# Patient Record
Sex: FEMALE | Race: BLACK OR AFRICAN AMERICAN | NOT HISPANIC OR LATINO | Employment: FULL TIME | URBAN - METROPOLITAN AREA
[De-identification: names, ages, dates, MRNs, and addresses within clinical notes are randomized per-mention and may not be internally consistent; named-entity substitution may affect disease eponyms.]

---

## 2023-10-16 ENCOUNTER — APPOINTMENT (OUTPATIENT)
Dept: RADIOLOGY | Facility: HOSPITAL | Age: 43
End: 2023-10-16
Payer: COMMERCIAL

## 2023-10-16 ENCOUNTER — HOSPITAL ENCOUNTER (OUTPATIENT)
Facility: HOSPITAL | Age: 43
Setting detail: OBSERVATION
Discharge: HOME | End: 2023-10-17
Attending: STUDENT IN AN ORGANIZED HEALTH CARE EDUCATION/TRAINING PROGRAM | Admitting: INTERNAL MEDICINE
Payer: COMMERCIAL

## 2023-10-16 DIAGNOSIS — F41.9 ANXIETY DISORDER, UNSPECIFIED TYPE: ICD-10-CM

## 2023-10-16 DIAGNOSIS — M79.605 PAIN IN LEFT LEG: ICD-10-CM

## 2023-10-16 DIAGNOSIS — E87.6 HYPOKALEMIA: Primary | ICD-10-CM

## 2023-10-16 DIAGNOSIS — I10 HYPERTENSION, UNSPECIFIED TYPE: ICD-10-CM

## 2023-10-16 DIAGNOSIS — R06.02 SHORTNESS OF BREATH: ICD-10-CM

## 2023-10-16 LAB
ALBUMIN SERPL BCP-MCNC: 4.4 G/DL (ref 3.4–5)
ALP SERPL-CCNC: 115 U/L (ref 33–110)
ALT SERPL W P-5'-P-CCNC: 61 U/L (ref 7–45)
ANION GAP SERPL CALC-SCNC: 26 MMOL/L (ref 10–20)
AST SERPL W P-5'-P-CCNC: 128 U/L (ref 9–39)
BASE EXCESS BLDV CALC-SCNC: -5.5 MMOL/L (ref -2–3)
BASOPHILS # BLD AUTO: 0.01 X10*3/UL (ref 0–0.1)
BASOPHILS NFR BLD AUTO: 0.2 %
BILIRUB SERPL-MCNC: 0.9 MG/DL (ref 0–1.2)
BODY TEMPERATURE: 37 DEGREES CELSIUS
BUN SERPL-MCNC: 4 MG/DL (ref 6–23)
CALCIUM SERPL-MCNC: 8.7 MG/DL (ref 8.6–10.3)
CARDIAC TROPONIN I PNL SERPL HS: 10 NG/L (ref 0–13)
CHLORIDE SERPL-SCNC: 98 MMOL/L (ref 98–107)
CO2 SERPL-SCNC: 17 MMOL/L (ref 21–32)
CREAT SERPL-MCNC: 0.59 MG/DL (ref 0.5–1.05)
EOSINOPHIL # BLD AUTO: 0 X10*3/UL (ref 0–0.7)
EOSINOPHIL NFR BLD AUTO: 0 %
ERYTHROCYTE [DISTWIDTH] IN BLOOD BY AUTOMATED COUNT: 13 % (ref 11.5–14.5)
ETHANOL SERPL-MCNC: 41 MG/DL
GFR SERPL CREATININE-BSD FRML MDRD: >90 ML/MIN/1.73M*2
GLUCOSE SERPL-MCNC: 101 MG/DL (ref 74–99)
HCO3 BLDV-SCNC: 17.5 MMOL/L (ref 22–26)
HCT VFR BLD AUTO: 36.7 % (ref 36–46)
HGB BLD-MCNC: 12.7 G/DL (ref 12–16)
IMM GRANULOCYTES # BLD AUTO: 0.01 X10*3/UL (ref 0–0.7)
IMM GRANULOCYTES NFR BLD AUTO: 0.2 % (ref 0–0.9)
INHALED O2 CONCENTRATION: 21 %
LYMPHOCYTES # BLD AUTO: 1.79 X10*3/UL (ref 1.2–4.8)
LYMPHOCYTES NFR BLD AUTO: 35.6 %
MAGNESIUM SERPL-MCNC: 0.8 MG/DL (ref 1.6–2.4)
MCH RBC QN AUTO: 33.5 PG (ref 26–34)
MCHC RBC AUTO-ENTMCNC: 34.6 G/DL (ref 32–36)
MCV RBC AUTO: 97 FL (ref 80–100)
MONOCYTES # BLD AUTO: 0.51 X10*3/UL (ref 0.1–1)
MONOCYTES NFR BLD AUTO: 10.1 %
NEUTROPHILS # BLD AUTO: 2.71 X10*3/UL (ref 1.2–7.7)
NEUTROPHILS NFR BLD AUTO: 53.9 %
NRBC BLD-RTO: 0 /100 WBCS (ref 0–0)
OXYHGB MFR BLDV: 80.8 % (ref 45–75)
PCO2 BLDV: 27 MM HG (ref 41–51)
PH BLDV: 7.42 PH (ref 7.33–7.43)
PLATELET # BLD AUTO: 227 X10*3/UL (ref 150–450)
PMV BLD AUTO: 10.4 FL (ref 7.5–11.5)
PO2 BLDV: 53 MM HG (ref 35–45)
POTASSIUM SERPL-SCNC: 2.7 MMOL/L (ref 3.5–5.3)
PROT SERPL-MCNC: 7.8 G/DL (ref 6.4–8.2)
RBC # BLD AUTO: 3.79 X10*6/UL (ref 4–5.2)
SAO2 % BLDV: 84 % (ref 45–75)
SODIUM SERPL-SCNC: 138 MMOL/L (ref 136–145)
TEST COMMENT: ABNORMAL
WBC # BLD AUTO: 5 X10*3/UL (ref 4.4–11.3)

## 2023-10-16 PROCEDURE — 71045 X-RAY EXAM CHEST 1 VIEW: CPT | Mod: FY

## 2023-10-16 PROCEDURE — 85025 COMPLETE CBC W/AUTO DIFF WBC: CPT | Performed by: STUDENT IN AN ORGANIZED HEALTH CARE EDUCATION/TRAINING PROGRAM

## 2023-10-16 PROCEDURE — 99285 EMERGENCY DEPT VISIT HI MDM: CPT | Performed by: STUDENT IN AN ORGANIZED HEALTH CARE EDUCATION/TRAINING PROGRAM

## 2023-10-16 PROCEDURE — 96366 THER/PROPH/DIAG IV INF ADDON: CPT

## 2023-10-16 PROCEDURE — 94640 AIRWAY INHALATION TREATMENT: CPT

## 2023-10-16 PROCEDURE — 80307 DRUG TEST PRSMV CHEM ANLYZR: CPT | Performed by: STUDENT IN AN ORGANIZED HEALTH CARE EDUCATION/TRAINING PROGRAM

## 2023-10-16 PROCEDURE — 36415 COLL VENOUS BLD VENIPUNCTURE: CPT | Performed by: STUDENT IN AN ORGANIZED HEALTH CARE EDUCATION/TRAINING PROGRAM

## 2023-10-16 PROCEDURE — 84484 ASSAY OF TROPONIN QUANT: CPT | Performed by: STUDENT IN AN ORGANIZED HEALTH CARE EDUCATION/TRAINING PROGRAM

## 2023-10-16 PROCEDURE — 82805 BLOOD GASES W/O2 SATURATION: CPT | Performed by: STUDENT IN AN ORGANIZED HEALTH CARE EDUCATION/TRAINING PROGRAM

## 2023-10-16 PROCEDURE — 2500000001 HC RX 250 WO HCPCS SELF ADMINISTERED DRUGS (ALT 637 FOR MEDICARE OP): Performed by: STUDENT IN AN ORGANIZED HEALTH CARE EDUCATION/TRAINING PROGRAM

## 2023-10-16 PROCEDURE — 83735 ASSAY OF MAGNESIUM: CPT | Performed by: STUDENT IN AN ORGANIZED HEALTH CARE EDUCATION/TRAINING PROGRAM

## 2023-10-16 PROCEDURE — 2500000002 HC RX 250 W HCPCS SELF ADMINISTERED DRUGS (ALT 637 FOR MEDICARE OP, ALT 636 FOR OP/ED): Performed by: STUDENT IN AN ORGANIZED HEALTH CARE EDUCATION/TRAINING PROGRAM

## 2023-10-16 PROCEDURE — 96375 TX/PRO/DX INJ NEW DRUG ADDON: CPT

## 2023-10-16 PROCEDURE — 80053 COMPREHEN METABOLIC PANEL: CPT | Performed by: STUDENT IN AN ORGANIZED HEALTH CARE EDUCATION/TRAINING PROGRAM

## 2023-10-16 PROCEDURE — 71045 X-RAY EXAM CHEST 1 VIEW: CPT | Performed by: RADIOLOGY

## 2023-10-16 PROCEDURE — 2500000004 HC RX 250 GENERAL PHARMACY W/ HCPCS (ALT 636 FOR OP/ED): Performed by: STUDENT IN AN ORGANIZED HEALTH CARE EDUCATION/TRAINING PROGRAM

## 2023-10-16 PROCEDURE — G0378 HOSPITAL OBSERVATION PER HR: HCPCS

## 2023-10-16 PROCEDURE — 96365 THER/PROPH/DIAG IV INF INIT: CPT

## 2023-10-16 PROCEDURE — 82077 ASSAY SPEC XCP UR&BREATH IA: CPT | Performed by: STUDENT IN AN ORGANIZED HEALTH CARE EDUCATION/TRAINING PROGRAM

## 2023-10-16 RX ORDER — ACETAMINOPHEN 325 MG/1
975 TABLET ORAL EVERY 6 HOURS PRN
Status: DISCONTINUED | OUTPATIENT
Start: 2023-10-16 | End: 2023-10-17 | Stop reason: HOSPADM

## 2023-10-16 RX ORDER — POTASSIUM CHLORIDE 1.5 G/1.58G
40 POWDER, FOR SOLUTION ORAL ONCE
Status: COMPLETED | OUTPATIENT
Start: 2023-10-16 | End: 2023-10-16

## 2023-10-16 RX ORDER — POTASSIUM CHLORIDE 14.9 MG/ML
20 INJECTION INTRAVENOUS ONCE
Status: COMPLETED | OUTPATIENT
Start: 2023-10-16 | End: 2023-10-17

## 2023-10-16 RX ORDER — PANTOPRAZOLE SODIUM 40 MG/10ML
40 INJECTION, POWDER, LYOPHILIZED, FOR SOLUTION INTRAVENOUS
Status: DISCONTINUED | OUTPATIENT
Start: 2023-10-17 | End: 2023-10-17 | Stop reason: HOSPADM

## 2023-10-16 RX ORDER — ALBUTEROL SULFATE 0.83 MG/ML
2.5 SOLUTION RESPIRATORY (INHALATION) ONCE
Status: COMPLETED | OUTPATIENT
Start: 2023-10-16 | End: 2023-10-16

## 2023-10-16 RX ORDER — DOCUSATE SODIUM 100 MG/1
100 CAPSULE, LIQUID FILLED ORAL 2 TIMES DAILY
Status: DISCONTINUED | OUTPATIENT
Start: 2023-10-17 | End: 2023-10-17 | Stop reason: HOSPADM

## 2023-10-16 RX ORDER — TALC
3 POWDER (GRAM) TOPICAL
Status: DISCONTINUED | OUTPATIENT
Start: 2023-10-16 | End: 2023-10-17 | Stop reason: HOSPADM

## 2023-10-16 RX ORDER — LORAZEPAM 2 MG/ML
2 INJECTION INTRAMUSCULAR ONCE
Status: COMPLETED | OUTPATIENT
Start: 2023-10-16 | End: 2023-10-16

## 2023-10-16 RX ORDER — PANTOPRAZOLE SODIUM 40 MG/1
40 TABLET, DELAYED RELEASE ORAL
Status: DISCONTINUED | OUTPATIENT
Start: 2023-10-17 | End: 2023-10-17 | Stop reason: HOSPADM

## 2023-10-16 RX ADMIN — POTASSIUM CHLORIDE 40 MEQ: 1.5 POWDER, FOR SOLUTION ORAL at 22:24

## 2023-10-16 RX ADMIN — LORAZEPAM 2 MG: 2 INJECTION INTRAMUSCULAR; INTRAVENOUS at 21:39

## 2023-10-16 RX ADMIN — SODIUM CHLORIDE 1000 ML: 9 INJECTION, SOLUTION INTRAVENOUS at 23:15

## 2023-10-16 RX ADMIN — ALBUTEROL SULFATE 2.5 MG: 2.5 SOLUTION RESPIRATORY (INHALATION) at 22:05

## 2023-10-16 RX ADMIN — POTASSIUM CHLORIDE 20 MEQ: 14.9 INJECTION, SOLUTION INTRAVENOUS at 22:29

## 2023-10-16 SDOH — SOCIAL STABILITY: SOCIAL INSECURITY: FAMILY BEHAVIORS: OTHER (COMMENT)

## 2023-10-16 SDOH — HEALTH STABILITY: MENTAL HEALTH: BEHAVIORS/MOOD: ANXIOUS

## 2023-10-16 SDOH — SOCIAL STABILITY: SOCIAL NETWORK

## 2023-10-16 ASSESSMENT — PAIN DESCRIPTION - PAIN TYPE: TYPE: OTHER (COMMENT)

## 2023-10-16 ASSESSMENT — PAIN SCALES - GENERAL: PAINLEVEL_OUTOF10: 4

## 2023-10-16 ASSESSMENT — PAIN - FUNCTIONAL ASSESSMENT: PAIN_FUNCTIONAL_ASSESSMENT: 0-10

## 2023-10-17 ENCOUNTER — APPOINTMENT (OUTPATIENT)
Dept: VASCULAR MEDICINE | Facility: HOSPITAL | Age: 43
End: 2023-10-17
Payer: COMMERCIAL

## 2023-10-17 VITALS
BODY MASS INDEX: 30.9 KG/M2 | WEIGHT: 196.87 LBS | HEART RATE: 86 BPM | DIASTOLIC BLOOD PRESSURE: 99 MMHG | RESPIRATION RATE: 18 BRPM | HEIGHT: 67 IN | OXYGEN SATURATION: 94 % | TEMPERATURE: 98.1 F | SYSTOLIC BLOOD PRESSURE: 165 MMHG

## 2023-10-17 PROBLEM — Z72.0 NICOTINE USE: Status: ACTIVE | Noted: 2023-10-17

## 2023-10-17 PROBLEM — R74.01 TRANSAMINITIS: Status: ACTIVE | Noted: 2023-10-17

## 2023-10-17 PROBLEM — E83.42 HYPOMAGNESEMIA: Status: ACTIVE | Noted: 2023-10-17

## 2023-10-17 PROBLEM — S84.92XA NEUROPRAXIA OF LEFT LOWER EXTREMITY: Chronic | Status: ACTIVE | Noted: 2023-10-17

## 2023-10-17 LAB
ALBUMIN SERPL BCP-MCNC: 3.6 G/DL (ref 3.4–5)
ALP SERPL-CCNC: 94 U/L (ref 33–110)
ALT SERPL W P-5'-P-CCNC: 45 U/L (ref 7–45)
AMPHETAMINES UR QL SCN: NORMAL
ANION GAP SERPL CALC-SCNC: 13 MMOL/L (ref 10–20)
AST SERPL W P-5'-P-CCNC: 69 U/L (ref 9–39)
BARBITURATES UR QL SCN: NORMAL
BASOPHILS # BLD AUTO: 0.02 X10*3/UL (ref 0–0.1)
BASOPHILS NFR BLD AUTO: 0.6 %
BENZODIAZ UR QL SCN: NORMAL
BILIRUB SERPL-MCNC: 0.9 MG/DL (ref 0–1.2)
BUN SERPL-MCNC: 3 MG/DL (ref 6–23)
BZE UR QL SCN: NORMAL
CALCIUM SERPL-MCNC: 8.1 MG/DL (ref 8.6–10.3)
CANNABINOIDS UR QL SCN: NORMAL
CHLORIDE SERPL-SCNC: 102 MMOL/L (ref 98–107)
CO2 SERPL-SCNC: 26 MMOL/L (ref 21–32)
CREAT SERPL-MCNC: 0.6 MG/DL (ref 0.5–1.05)
EOSINOPHIL # BLD AUTO: 0.04 X10*3/UL (ref 0–0.7)
EOSINOPHIL NFR BLD AUTO: 1.1 %
ERYTHROCYTE [DISTWIDTH] IN BLOOD BY AUTOMATED COUNT: 13.2 % (ref 11.5–14.5)
FENTANYL+NORFENTANYL UR QL SCN: NORMAL
GFR SERPL CREATININE-BSD FRML MDRD: >90 ML/MIN/1.73M*2
GLUCOSE SERPL-MCNC: 107 MG/DL (ref 74–99)
HCT VFR BLD AUTO: 35.4 % (ref 36–46)
HGB BLD-MCNC: 11.7 G/DL (ref 12–16)
IMM GRANULOCYTES # BLD AUTO: 0.02 X10*3/UL (ref 0–0.7)
IMM GRANULOCYTES NFR BLD AUTO: 0.6 % (ref 0–0.9)
LYMPHOCYTES # BLD AUTO: 1.55 X10*3/UL (ref 1.2–4.8)
LYMPHOCYTES NFR BLD AUTO: 43.3 %
MAGNESIUM SERPL-MCNC: 1.6 MG/DL (ref 1.6–2.4)
MCH RBC QN AUTO: 32.9 PG (ref 26–34)
MCHC RBC AUTO-ENTMCNC: 33.1 G/DL (ref 32–36)
MCV RBC AUTO: 99 FL (ref 80–100)
MONOCYTES # BLD AUTO: 0.42 X10*3/UL (ref 0.1–1)
MONOCYTES NFR BLD AUTO: 11.7 %
NEUTROPHILS # BLD AUTO: 1.53 X10*3/UL (ref 1.2–7.7)
NEUTROPHILS NFR BLD AUTO: 42.7 %
NRBC BLD-RTO: 0 /100 WBCS (ref 0–0)
OPIATES UR QL SCN: NORMAL
OXYCODONE+OXYMORPHONE UR QL SCN: NORMAL
PCP UR QL SCN: NORMAL
PLATELET # BLD AUTO: 203 X10*3/UL (ref 150–450)
PMV BLD AUTO: 11.3 FL (ref 7.5–11.5)
POTASSIUM SERPL-SCNC: 3.1 MMOL/L (ref 3.5–5.3)
PROT SERPL-MCNC: 6.7 G/DL (ref 6.4–8.2)
RBC # BLD AUTO: 3.56 X10*6/UL (ref 4–5.2)
SODIUM SERPL-SCNC: 138 MMOL/L (ref 136–145)
WBC # BLD AUTO: 3.6 X10*3/UL (ref 4.4–11.3)

## 2023-10-17 PROCEDURE — 96367 TX/PROPH/DG ADDL SEQ IV INF: CPT

## 2023-10-17 PROCEDURE — 93971 EXTREMITY STUDY: CPT

## 2023-10-17 PROCEDURE — 83735 ASSAY OF MAGNESIUM: CPT | Performed by: PHYSICIAN ASSISTANT

## 2023-10-17 PROCEDURE — 2500000001 HC RX 250 WO HCPCS SELF ADMINISTERED DRUGS (ALT 637 FOR MEDICARE OP): Performed by: PHYSICIAN ASSISTANT

## 2023-10-17 PROCEDURE — 2500000001 HC RX 250 WO HCPCS SELF ADMINISTERED DRUGS (ALT 637 FOR MEDICARE OP): Performed by: STUDENT IN AN ORGANIZED HEALTH CARE EDUCATION/TRAINING PROGRAM

## 2023-10-17 PROCEDURE — 96366 THER/PROPH/DIAG IV INF ADDON: CPT

## 2023-10-17 PROCEDURE — 96375 TX/PRO/DX INJ NEW DRUG ADDON: CPT

## 2023-10-17 PROCEDURE — 80053 COMPREHEN METABOLIC PANEL: CPT | Performed by: PHYSICIAN ASSISTANT

## 2023-10-17 PROCEDURE — 93971 EXTREMITY STUDY: CPT | Performed by: INTERNAL MEDICINE

## 2023-10-17 PROCEDURE — 99238 HOSP IP/OBS DSCHRG MGMT 30/<: CPT | Performed by: STUDENT IN AN ORGANIZED HEALTH CARE EDUCATION/TRAINING PROGRAM

## 2023-10-17 PROCEDURE — 2500000004 HC RX 250 GENERAL PHARMACY W/ HCPCS (ALT 636 FOR OP/ED): Performed by: STUDENT IN AN ORGANIZED HEALTH CARE EDUCATION/TRAINING PROGRAM

## 2023-10-17 PROCEDURE — 99221 1ST HOSP IP/OBS SF/LOW 40: CPT | Performed by: PHYSICIAN ASSISTANT

## 2023-10-17 PROCEDURE — 85025 COMPLETE CBC W/AUTO DIFF WBC: CPT | Performed by: PHYSICIAN ASSISTANT

## 2023-10-17 PROCEDURE — 36415 COLL VENOUS BLD VENIPUNCTURE: CPT | Performed by: PHYSICIAN ASSISTANT

## 2023-10-17 PROCEDURE — G0378 HOSPITAL OBSERVATION PER HR: HCPCS

## 2023-10-17 PROCEDURE — 2500000004 HC RX 250 GENERAL PHARMACY W/ HCPCS (ALT 636 FOR OP/ED): Performed by: PHYSICIAN ASSISTANT

## 2023-10-17 RX ORDER — AMLODIPINE BESYLATE 5 MG/1
5 TABLET ORAL DAILY
Qty: 30 TABLET | Refills: 0 | Status: SHIPPED | OUTPATIENT
Start: 2023-10-18 | End: 2023-11-17

## 2023-10-17 RX ORDER — LORAZEPAM 1 MG/1
1 TABLET ORAL EVERY 2 HOUR PRN
Status: DISCONTINUED | OUTPATIENT
Start: 2023-10-17 | End: 2023-10-17 | Stop reason: HOSPADM

## 2023-10-17 RX ORDER — MAGNESIUM SULFATE HEPTAHYDRATE 40 MG/ML
4 INJECTION, SOLUTION INTRAVENOUS ONCE
Status: COMPLETED | OUTPATIENT
Start: 2023-10-17 | End: 2023-10-17

## 2023-10-17 RX ORDER — FORMOTEROL FUMARATE DIHYDRATE 20 UG/2ML
20 SOLUTION RESPIRATORY (INHALATION)
Status: DISCONTINUED | OUTPATIENT
Start: 2023-10-17 | End: 2023-10-17 | Stop reason: HOSPADM

## 2023-10-17 RX ORDER — FLUTICASONE FUROATE AND VILANTEROL 100; 25 UG/1; UG/1
1 POWDER RESPIRATORY (INHALATION)
Status: DISCONTINUED | OUTPATIENT
Start: 2023-10-17 | End: 2023-10-17

## 2023-10-17 RX ORDER — DULOXETIN HYDROCHLORIDE 60 MG/1
60 CAPSULE, DELAYED RELEASE ORAL
COMMUNITY
Start: 2023-08-16

## 2023-10-17 RX ORDER — LANOLIN ALCOHOL/MO/W.PET/CERES
100 CREAM (GRAM) TOPICAL DAILY
Status: DISCONTINUED | OUTPATIENT
Start: 2023-10-17 | End: 2023-10-17 | Stop reason: HOSPADM

## 2023-10-17 RX ORDER — LOSARTAN POTASSIUM 100 MG/1
1 TABLET ORAL DAILY
COMMUNITY
Start: 2023-05-06

## 2023-10-17 RX ORDER — SPIRONOLACTONE 25 MG/1
25 TABLET ORAL DAILY
Status: DISCONTINUED | OUTPATIENT
Start: 2023-10-17 | End: 2023-10-17 | Stop reason: HOSPADM

## 2023-10-17 RX ORDER — POTASSIUM CHLORIDE 20 MEQ/1
40 TABLET, EXTENDED RELEASE ORAL ONCE
Status: COMPLETED | OUTPATIENT
Start: 2023-10-17 | End: 2023-10-17

## 2023-10-17 RX ORDER — PREGABALIN 25 MG/1
50 CAPSULE ORAL 3 TIMES DAILY
Status: DISCONTINUED | OUTPATIENT
Start: 2023-10-17 | End: 2023-10-17 | Stop reason: HOSPADM

## 2023-10-17 RX ORDER — CETIRIZINE HYDROCHLORIDE 10 MG/1
10 TABLET ORAL DAILY
COMMUNITY
Start: 2023-08-02

## 2023-10-17 RX ORDER — LORAZEPAM 0.5 MG/1
0.5 TABLET ORAL EVERY 2 HOUR PRN
Status: DISCONTINUED | OUTPATIENT
Start: 2023-10-17 | End: 2023-10-17 | Stop reason: HOSPADM

## 2023-10-17 RX ORDER — MULTIVIT-MIN/IRON FUM/FOLIC AC 7.5 MG-4
1 TABLET ORAL DAILY
Status: DISCONTINUED | OUTPATIENT
Start: 2023-10-17 | End: 2023-10-17 | Stop reason: HOSPADM

## 2023-10-17 RX ORDER — DULOXETIN HYDROCHLORIDE 30 MG/1
60 CAPSULE, DELAYED RELEASE ORAL
Status: DISCONTINUED | OUTPATIENT
Start: 2023-10-17 | End: 2023-10-17 | Stop reason: HOSPADM

## 2023-10-17 RX ORDER — AMLODIPINE BESYLATE 5 MG/1
5 TABLET ORAL DAILY
Status: DISCONTINUED | OUTPATIENT
Start: 2023-10-17 | End: 2023-10-17 | Stop reason: HOSPADM

## 2023-10-17 RX ORDER — KETOROLAC TROMETHAMINE 30 MG/ML
15 INJECTION, SOLUTION INTRAMUSCULAR; INTRAVENOUS ONCE
Status: COMPLETED | OUTPATIENT
Start: 2023-10-17 | End: 2023-10-17

## 2023-10-17 RX ORDER — SPIRONOLACTONE 25 MG/1
25 TABLET ORAL DAILY
Status: ON HOLD | COMMUNITY
Start: 2023-05-06 | End: 2024-02-22 | Stop reason: ENTERED-IN-ERROR

## 2023-10-17 RX ORDER — LORAZEPAM 1 MG/1
2 TABLET ORAL EVERY 2 HOUR PRN
Status: DISCONTINUED | OUTPATIENT
Start: 2023-10-17 | End: 2023-10-17 | Stop reason: HOSPADM

## 2023-10-17 RX ORDER — LOSARTAN POTASSIUM 50 MG/1
100 TABLET ORAL DAILY
Status: DISCONTINUED | OUTPATIENT
Start: 2023-10-17 | End: 2023-10-17 | Stop reason: HOSPADM

## 2023-10-17 RX ORDER — FOLIC ACID 1 MG/1
1 TABLET ORAL DAILY
Status: DISCONTINUED | OUTPATIENT
Start: 2023-10-17 | End: 2023-10-17 | Stop reason: HOSPADM

## 2023-10-17 RX ORDER — PREGABALIN 50 MG/1
50 CAPSULE ORAL 3 TIMES DAILY
Status: ON HOLD | COMMUNITY
Start: 2023-07-06 | End: 2024-02-22 | Stop reason: ENTERED-IN-ERROR

## 2023-10-17 RX ORDER — HYDRALAZINE HYDROCHLORIDE 20 MG/ML
5 INJECTION INTRAMUSCULAR; INTRAVENOUS EVERY 4 HOURS PRN
Status: DISCONTINUED | OUTPATIENT
Start: 2023-10-17 | End: 2023-10-17 | Stop reason: HOSPADM

## 2023-10-17 RX ORDER — FLUTICASONE PROPIONATE AND SALMETEROL 100; 50 UG/1; UG/1
1 POWDER RESPIRATORY (INHALATION)
COMMUNITY
Start: 2023-02-10

## 2023-10-17 RX ORDER — BUDESONIDE 0.5 MG/2ML
0.5 INHALANT ORAL
Status: DISCONTINUED | OUTPATIENT
Start: 2023-10-17 | End: 2023-10-17 | Stop reason: HOSPADM

## 2023-10-17 RX ORDER — MAGNESIUM SULFATE HEPTAHYDRATE 40 MG/ML
2 INJECTION, SOLUTION INTRAVENOUS ONCE
Status: COMPLETED | OUTPATIENT
Start: 2023-10-17 | End: 2023-10-17

## 2023-10-17 RX ORDER — ACETAMINOPHEN 500 MG
TABLET ORAL
Qty: 1 KIT | Refills: 0 | Status: SHIPPED | OUTPATIENT
Start: 2023-10-17

## 2023-10-17 RX ADMIN — FOLIC ACID 1 MG: 1 TABLET ORAL at 10:18

## 2023-10-17 RX ADMIN — MULTIPLE VITAMINS W/ MINERALS TAB 1 TABLET: TAB at 10:18

## 2023-10-17 RX ADMIN — ACETAMINOPHEN 975 MG: 325 TABLET ORAL at 12:03

## 2023-10-17 RX ADMIN — HYDRALAZINE HYDROCHLORIDE 5 MG: 20 INJECTION INTRAMUSCULAR; INTRAVENOUS at 12:51

## 2023-10-17 RX ADMIN — POTASSIUM CHLORIDE 40 MEQ: 1500 TABLET, EXTENDED RELEASE ORAL at 15:08

## 2023-10-17 RX ADMIN — THIAMINE HCL TAB 100 MG 100 MG: 100 TAB at 10:18

## 2023-10-17 RX ADMIN — MAGNESIUM SULFATE HEPTAHYDRATE 2 G: 40 INJECTION, SOLUTION INTRAVENOUS at 15:08

## 2023-10-17 RX ADMIN — MAGNESIUM SULFATE HEPTAHYDRATE 4 G: 40 INJECTION, SOLUTION INTRAVENOUS at 02:58

## 2023-10-17 RX ADMIN — AMLODIPINE BESYLATE 5 MG: 5 TABLET ORAL at 10:18

## 2023-10-17 RX ADMIN — PREGABALIN 50 MG: 25 CAPSULE ORAL at 08:29

## 2023-10-17 RX ADMIN — DULOXETINE HYDROCHLORIDE 60 MG: 30 CAPSULE, DELAYED RELEASE ORAL at 08:29

## 2023-10-17 RX ADMIN — LOSARTAN POTASSIUM 100 MG: 50 TABLET, FILM COATED ORAL at 02:44

## 2023-10-17 RX ADMIN — Medication 3 MG: at 01:22

## 2023-10-17 RX ADMIN — ACETAMINOPHEN 975 MG: 325 TABLET ORAL at 01:22

## 2023-10-17 RX ADMIN — SPIRONOLACTONE 25 MG: 25 TABLET, FILM COATED ORAL at 02:44

## 2023-10-17 RX ADMIN — KETOROLAC TROMETHAMINE 15 MG: 30 INJECTION, SOLUTION INTRAMUSCULAR at 12:51

## 2023-10-17 SDOH — ECONOMIC STABILITY: INCOME INSECURITY: HOW HARD IS IT FOR YOU TO PAY FOR THE VERY BASICS LIKE FOOD, HOUSING, MEDICAL CARE, AND HEATING?: HARD

## 2023-10-17 SDOH — SOCIAL STABILITY: SOCIAL INSECURITY: DO YOU FEEL ANYONE HAS EXPLOITED OR TAKEN ADVANTAGE OF YOU FINANCIALLY OR OF YOUR PERSONAL PROPERTY?: NO

## 2023-10-17 SDOH — ECONOMIC STABILITY: TRANSPORTATION INSECURITY
IN THE PAST 12 MONTHS, HAS LACK OF TRANSPORTATION KEPT YOU FROM MEETINGS, WORK, OR FROM GETTING THINGS NEEDED FOR DAILY LIVING?: YES

## 2023-10-17 SDOH — SOCIAL STABILITY: SOCIAL INSECURITY: DOES ANYONE TRY TO KEEP YOU FROM HAVING/CONTACTING OTHER FRIENDS OR DOING THINGS OUTSIDE YOUR HOME?: NO

## 2023-10-17 SDOH — HEALTH STABILITY: PHYSICAL HEALTH: ON AVERAGE, HOW MANY DAYS PER WEEK DO YOU ENGAGE IN MODERATE TO STRENUOUS EXERCISE (LIKE A BRISK WALK)?: 1 DAY

## 2023-10-17 SDOH — SOCIAL STABILITY: SOCIAL INSECURITY: ABUSE: ADULT

## 2023-10-17 SDOH — SOCIAL STABILITY: SOCIAL INSECURITY: ARE YOU OR HAVE YOU BEEN THREATENED OR ABUSED PHYSICALLY, EMOTIONALLY, OR SEXUALLY BY ANYONE?: NO

## 2023-10-17 SDOH — SOCIAL STABILITY: SOCIAL INSECURITY: WERE YOU ABLE TO COMPLETE ALL THE BEHAVIORAL HEALTH SCREENINGS?: YES

## 2023-10-17 SDOH — ECONOMIC STABILITY: TRANSPORTATION INSECURITY
IN THE PAST 12 MONTHS, HAS THE LACK OF TRANSPORTATION KEPT YOU FROM MEDICAL APPOINTMENTS OR FROM GETTING MEDICATIONS?: YES

## 2023-10-17 SDOH — ECONOMIC STABILITY: HOUSING INSECURITY
IN THE LAST 12 MONTHS, WAS THERE A TIME WHEN YOU DID NOT HAVE A STEADY PLACE TO SLEEP OR SLEPT IN A SHELTER (INCLUDING NOW)?: YES

## 2023-10-17 SDOH — ECONOMIC STABILITY: INCOME INSECURITY: IN THE LAST 12 MONTHS, WAS THERE A TIME WHEN YOU WERE NOT ABLE TO PAY THE MORTGAGE OR RENT ON TIME?: YES

## 2023-10-17 SDOH — SOCIAL STABILITY: SOCIAL INSECURITY: ARE THERE ANY APPARENT SIGNS OF INJURIES/BEHAVIORS THAT COULD BE RELATED TO ABUSE/NEGLECT?: NO

## 2023-10-17 SDOH — SOCIAL STABILITY: SOCIAL INSECURITY: HAS ANYONE EVER THREATENED TO HURT YOUR FAMILY OR YOUR PETS?: NO

## 2023-10-17 SDOH — HEALTH STABILITY: PHYSICAL HEALTH: ON AVERAGE, HOW MANY MINUTES DO YOU ENGAGE IN EXERCISE AT THIS LEVEL?: 10 MIN

## 2023-10-17 SDOH — SOCIAL STABILITY: SOCIAL INSECURITY: HAVE YOU HAD THOUGHTS OF HARMING ANYONE ELSE?: NO

## 2023-10-17 SDOH — ECONOMIC STABILITY: HOUSING INSECURITY: IN THE LAST 12 MONTHS, HOW MANY PLACES HAVE YOU LIVED?: 1

## 2023-10-17 SDOH — SOCIAL STABILITY: SOCIAL INSECURITY: DO YOU FEEL UNSAFE GOING BACK TO THE PLACE WHERE YOU ARE LIVING?: NO

## 2023-10-17 ASSESSMENT — LIFESTYLE VARIABLES
HEADACHE, FULLNESS IN HEAD: NOT PRESENT
AUDIT-C TOTAL SCORE: 7
TREMOR: NOT VISIBLE, BUT CAN BE FELT FINGERTIP TO FINGERTIP
PAROXYSMAL SWEATS: BARELY PERCEPTIBLE SWEATING, PALMS MOIST
HOW OFTEN DURING THE LAST YEAR HAVE YOU HAD A FEELING OF GUILT OR REMORSE AFTER DRINKING: NEVER
NAUSEA AND VOMITING: NO NAUSEA AND NO VOMITING
PULSE: 91
VISUAL DISTURBANCES: NOT PRESENT
NAUSEA AND VOMITING: NO NAUSEA AND NO VOMITING
ANXIETY: 2
AUDITORY DISTURBANCES: NOT PRESENT
ORIENTATION AND CLOUDING OF SENSORIUM: ORIENTED AND CAN DO SERIAL ADDITIONS
SKIP TO QUESTIONS 9-10: 0
VISUAL DISTURBANCES: NOT PRESENT
ANXIETY: 2
AUDITORY DISTURBANCES: NOT PRESENT
HEADACHE, FULLNESS IN HEAD: NOT PRESENT
AGITATION: NORMAL ACTIVITY
AUDIT TOTAL SCORE: 1
ANXIETY: 2
AGITATION: NORMAL ACTIVITY
AGITATION: NORMAL ACTIVITY
HOW OFTEN DURING THE LAST YEAR HAVE YOU BEEN UNABLE TO REMEMBER WHAT HAPPENED THE NIGHT BEFORE BECAUSE YOU HAD BEEN DRINKING: NEVER
PRESCIPTION_ABUSE_PAST_12_MONTHS: NO
ORIENTATION AND CLOUDING OF SENSORIUM: ORIENTED AND CAN DO SERIAL ADDITIONS
AUDIT-C TOTAL SCORE: 7
HEADACHE, FULLNESS IN HEAD: NOT PRESENT
NAUSEA AND VOMITING: NO NAUSEA AND NO VOMITING
TREMOR: 2
ANXIETY: 3
HEADACHE, FULLNESS IN HEAD: NOT PRESENT
TOTAL SCORE: 4
TOTAL SCORE: 6
NAUSEA AND VOMITING: NO NAUSEA AND NO VOMITING
AUDITORY DISTURBANCES: NOT PRESENT
TOTAL SCORE: 4
AUDITORY DISTURBANCES: NOT PRESENT
SUBSTANCE_ABUSE_PAST_12_MONTHS: NO
HOW OFTEN DURING THE LAST YEAR HAVE YOU FAILED TO DO WHAT WAS NORMALLY EXPECTED FROM YOU BECAUSE OF DRINKING: NEVER
PAROXYSMAL SWEATS: BARELY PERCEPTIBLE SWEATING, PALMS MOIST
PAROXYSMAL SWEATS: NO SWEAT VISIBLE
ORIENTATION AND CLOUDING OF SENSORIUM: ORIENTED AND CAN DO SERIAL ADDITIONS
ORIENTATION AND CLOUDING OF SENSORIUM: ORIENTED AND CAN DO SERIAL ADDITIONS
ANXIETY: 3
NAUSEA AND VOMITING: NO NAUSEA AND NO VOMITING
HEADACHE, FULLNESS IN HEAD: NOT PRESENT
HOW OFTEN DURING THE LAST YEAR HAVE YOU FOUND THAT YOU WERE NOT ABLE TO STOP DRINKING ONCE YOU HAD STARTED: NEVER
AGITATION: NORMAL ACTIVITY
TREMOR: NOT VISIBLE, BUT CAN BE FELT FINGERTIP TO FINGERTIP
HAS A RELATIVE, FRIEND, DOCTOR, OR ANOTHER HEALTH PROFESSIONAL EXPRESSED CONCERN ABOUT YOUR DRINKING OR SUGGESTED YOU CUT DOWN: NO
PAROXYSMAL SWEATS: BARELY PERCEPTIBLE SWEATING, PALMS MOIST
HOW OFTEN DO YOU HAVE A DRINK CONTAINING ALCOHOL: 2-3 TIMES A WEEK
TOTAL SCORE: 4
AUDIT TOTAL SCORE: 8
ORIENTATION AND CLOUDING OF SENSORIUM: ORIENTED AND CAN DO SERIAL ADDITIONS
VISUAL DISTURBANCES: NOT PRESENT
TREMOR: NOT VISIBLE, BUT CAN BE FELT FINGERTIP TO FINGERTIP
AUDITORY DISTURBANCES: NOT PRESENT
PAROXYSMAL SWEATS: NO SWEAT VISIBLE
HAVE YOU OR SOMEONE ELSE BEEN INJURED AS A RESULT OF YOUR DRINKING: NO
VISUAL DISTURBANCES: NOT PRESENT
PULSE: 86
HOW OFTEN DO YOU HAVE 6 OR MORE DRINKS ON ONE OCCASION: WEEKLY
VISUAL DISTURBANCES: NOT PRESENT
TREMOR: 2
HOW OFTEN DURING THE LAST YEAR HAVE YOU NEEDED AN ALCOHOLIC DRINK FIRST THING IN THE MORNING TO GET YOURSELF GOING AFTER A NIGHT OF HEAVY DRINKING: LESS THAN MONTHLY
TOTAL SCORE: 4
AGITATION: NORMAL ACTIVITY
HOW MANY STANDARD DRINKS CONTAINING ALCOHOL DO YOU HAVE ON A TYPICAL DAY: 3 OR 4

## 2023-10-17 ASSESSMENT — ACTIVITIES OF DAILY LIVING (ADL)
BATHING: INDEPENDENT
WALKS IN HOME: INDEPENDENT
HEARING - RIGHT EAR: FUNCTIONAL
ADEQUATE_TO_COMPLETE_ADL: YES
LACK_OF_TRANSPORTATION: YES
TOILETING: INDEPENDENT
ASSISTIVE_DEVICE: CRUTCHES
PATIENT'S MEMORY ADEQUATE TO SAFELY COMPLETE DAILY ACTIVITIES?: YES
HEARING - LEFT EAR: FUNCTIONAL
GROOMING: INDEPENDENT
DRESSING YOURSELF: INDEPENDENT
FEEDING YOURSELF: INDEPENDENT
JUDGMENT_ADEQUATE_SAFELY_COMPLETE_DAILY_ACTIVITIES: YES

## 2023-10-17 ASSESSMENT — PATIENT HEALTH QUESTIONNAIRE - PHQ9
SUM OF ALL RESPONSES TO PHQ9 QUESTIONS 1 & 2: 0
1. LITTLE INTEREST OR PLEASURE IN DOING THINGS: NOT AT ALL
2. FEELING DOWN, DEPRESSED OR HOPELESS: NOT AT ALL

## 2023-10-17 ASSESSMENT — ENCOUNTER SYMPTOMS
PALPITATIONS: 0
CONSTIPATION: 0
ABDOMINAL PAIN: 0
FEVER: 0
ADENOPATHY: 0
CHOKING: 0
CHILLS: 0
VOMITING: 0
NERVOUS/ANXIOUS: 1
HEADACHES: 0
SINUS PRESSURE: 0
TROUBLE SWALLOWING: 0
CHEST TIGHTNESS: 0
DYSURIA: 0
MUSCULOSKELETAL NEGATIVE: 1
SHORTNESS OF BREATH: 1
HALLUCINATIONS: 0
LIGHT-HEADEDNESS: 0
EYES NEGATIVE: 1
DIARRHEA: 0
COUGH: 0
ENDOCRINE NEGATIVE: 1
NAUSEA: 0
BRUISES/BLEEDS EASILY: 0

## 2023-10-17 ASSESSMENT — COGNITIVE AND FUNCTIONAL STATUS - GENERAL
PATIENT BASELINE BEDBOUND: NO
MOBILITY SCORE: 24
DAILY ACTIVITIY SCORE: 24

## 2023-10-17 ASSESSMENT — PAIN SCALES - GENERAL: PAINLEVEL_OUTOF10: 10 - WORST POSSIBLE PAIN

## 2023-10-17 NOTE — DISCHARGE SUMMARY
"Discharge Diagnosis  Hypokalemia    Issues Requiring Follow-Up  Electrolyte abnormalities  BP control    Discharge Meds     Your medication list        ASK your doctor about these medications        Instructions Last Dose Given Next Dose Due   cetirizine 10 mg tablet  Commonly known as: ZyrTEC           DULoxetine 60 mg DR capsule  Commonly known as: Cymbalta           fluticasone propion-salmeteroL 100-50 mcg/dose diskus inhaler  Commonly known as: Advair Diskus           losartan 100 mg tablet  Commonly known as: Cozaar           pregabalin 50 mg capsule  Commonly known as: Lyrica           spironolactone 25 mg tablet  Commonly known as: Aldactone                    Test Results Pending At Discharge  Pending Labs       No current pending labs.            Hospital Course   Sharla Ramirez is a 43 y.o. female presenting with c/o \"my asthma.\"  Reports her asthma acts up with seasonal changes.  She reports increased SOB and feeling anxious due to her breathing.  Denies URI symptoms or cough.  SOB has since improved.  She endorses feeling anxious.  She reports drinking ETOH \"sometimes.\" And last drink was on Monday prior to coming to the ER.  She has chronic pain LLE due to injury and using crutches to ambulate.       Assessment/Plan:   Principal Problem:    Hypokalemia  Active Problems:    Allergic rhinitis    Essential hypertension    Mild persistent asthma    Hypomagnesemia    Nicotine use    Neuropraxia of left lower extremity    Transaminitis  -CBC: no leukocytosis, no acute anemia, no thrombocytopenia  -CMP: hypokalemia 2.7, BUN  and creatinine low, bicarb  AG elevated, , ALT 61 --> AST/ALT ratio suggestive of ETOH, ETOH level elevated, ALK phos 115  -VBG: without acidosis,   -Mg 0.8 --> 4G IV magnesium ordered  -Another repletion of Mg was ordered during hospitalization prior to discharge  -Potassium repleted per protocol  -ETOH level 41 --> denies history of ETOH withdraw -> unclear if patient's " symptoms were reflective of withdrawal or not at this time. We did order CIWA. Patient did not exhibit any seizures or delirium tremens while inpatient.  -HS HERNAN WNL  -CXR: no acute processes   -EKG: non ischemic  -repeat labs in AM   -resume home meds as appropriate  -BP suboptimally controlled --> may need to consider additional agent to optimize control  --> added Amlodipine to patient's regimen. Provided prescription for amlodipine and BP monitor at discharge to monitor BP until she sees her PCP.  -encouraged smoking cessation, nicotine replacement offered. > 3 minutes spent providing smoking cessation counseling  -GI ppx: Protonix, bowel regimen  -VTE ppx: Ambulation   -No SOB. No hypoxia. No acute respiratory distress, no conversational dyspnea. Patient feeling at baseline in terms of her asthma and feels she is ready to go home. Patient education given at length. Patient to follow up with PCP within 1 week.    Patient is hemodynamically stable and ready for discharge. Clear discharge instructions and return precautions provided. Medications and instructions have been discussed.    Labs/Testing reviewed  Interdisciplinary team rounding completed with hospitalist, nurse, TCC  PA discussed plan and lab/testing results with hospital medicine attending Dr. Haley    * 55 minutes total spent on patient's care today; >50% time spent on counseling/coordination of care      Pertinent Physical Exam At Time of Discharge  Physical Exam  PHYSICAL EXAM:  GENERAL: Alert, NAD, cooperative  SKIN: Warm and dry.  No suspicious lesions or rashes.  HEENT:  NCAT, PERRLA, EOMI, nonicteric sclera, MMM, neck supple, trachea midline  LUNGS: Unlabored on RA, symmetric chest expansion, no significant wheezing, rhonchi, or rales appreciated  CARDS: RRR, no m/g/r appreciated  GI: Soft, NTND, BS+, no rebound, no guarding   : no moran, voids independently   MS/Extremities: WWP, no edema, distal pulses intact, moves all extremities  NEURO:  A&Ox3, grossly nonfocal exam, no tremors, speech fluent, follows commands, answers questions appropriately  PSYCH: mood and behavior appropriate  Outpatient Follow-Up  No future appointments.      Kacy Pinon PA-C

## 2023-10-17 NOTE — PROGRESS NOTES
10/17/23 0841   Discharge Planning   Living Arrangements Spouse/significant other;Children   Support Systems Spouse/significant other;Children   Type of Residence Private residence   Home or Post Acute Services None   Patient expects to be discharged to: home   Does the patient need discharge transport arranged? No     I met with patient at bedside and explained tcc role. She lives in  an apartment with her 3 kids, has crutches that she uses for ambulation, does not drive uses lyft or son takes to drs appointments.  denies any hhc needs at this time.

## 2023-10-17 NOTE — H&P
" History Of Present Illness  Sharla Ramirez is a 43 y.o. female presenting with c/o \"my asthma.\"  Reports her asthma acts up with seasonal changes.  She reports increased SOB and feeling anxious due to her breathing.  Denies URI symptoms or cough.  SOB has since improved.  She endorses feeling anxious.  She reports drinking ETOH \"sometimes.\" And last drink was on Monday prior to coming to the ER.  She has chronic pain LLE due to injury and using crutches to ambulate.      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       Surgical History  No past surgical history on file.    Social History  Social History     Socioeconomic History    Marital status: Single     Spouse name: None    Number of children: None    Years of education: None    Highest education level: None   Occupational History    None   Tobacco Use    Smoking status: Every Day     Types: Cigarettes    Smokeless tobacco: Never   Substance and Sexual Activity    Alcohol use: Yes    Drug use: Not Currently    Sexual activity: None   Other Topics Concern    None   Social History Narrative    None     Social Determinants of Health     Financial Resource Strain: High Risk (10/17/2023)    Overall Financial Resource Strain (CARDIA)     Difficulty of Paying Living Expenses: Hard   Food Insecurity: Not on file   Transportation Needs: Unmet Transportation Needs (10/17/2023)    PRAPARE - Transportation     Lack of Transportation (Medical): Yes     Lack of Transportation (Non-Medical): Yes   Physical Activity: Insufficiently Active (10/17/2023)    Exercise Vital Sign     Days of Exercise per Week: 1 day     Minutes of Exercise per Session: 10 min   Stress: Not on file   Social Connections: Not on file   Intimate Partner Violence: Not on file   Housing Stability: High Risk (10/17/2023)    Housing " Stability Vital Sign     Unable to Pay for Housing in the Last Year: Yes     Number of Places Lived in the Last Year: 1     Unstable Housing in the Last Year: Yes        Family History  No family history on file.     Allergies  Allergies as of 10/16/2023 - Reviewed 10/16/2023   Allergen Reaction Noted    Morphine Hives 10/16/2023        Review of Systems  Review of Systems   Constitutional:  Negative for chills and fever.   HENT:  Negative for congestion, postnasal drip, sinus pressure and trouble swallowing.    Eyes: Negative.    Respiratory:  Positive for shortness of breath. Negative for cough, choking and chest tightness.    Cardiovascular:  Negative for chest pain, palpitations and leg swelling.   Gastrointestinal:  Negative for abdominal pain, constipation, diarrhea, nausea and vomiting.   Endocrine: Negative.    Genitourinary: Negative.  Negative for decreased urine volume, dysuria and urgency.   Musculoskeletal: Negative.    Skin: Negative.    Allergic/Immunologic: Positive for environmental allergies.   Neurological:  Negative for light-headedness and headaches.   Hematological:  Negative for adenopathy. Does not bruise/bleed easily.   Psychiatric/Behavioral:  Negative for hallucinations, self-injury and suicidal ideas. The patient is nervous/anxious.    All other systems reviewed and are negative.      Relevant results reviewed   PROVIDER notes  Nursing notes  MEDS:  Current Facility-Administered Medications   Medication Dose Route Frequency Provider Last Rate Last Admin    acetaminophen (Tylenol) tablet 975 mg  975 mg oral q6h PRN Diamante Morales PA-C   975 mg at 10/17/23 0122    docusate sodium (Colace) capsule 100 mg  100 mg oral BID Diamante Morales PA-C        DULoxetine (Cymbalta) DR capsule 60 mg  60 mg oral Daily Diamante Morales PA-C        losartan (Cozaar) tablet 100 mg  100 mg oral Daily Diamante Morales PA-C   100 mg at 10/17/23 0244    magnesium sulfate IV 4 g  4 g intravenous Once Diamante RAYA  LEROY Morales 25 mL/hr at 10/17/23 0258 4 g at 10/17/23 0258    melatonin tablet 3 mg  3 mg oral Daily Diamante Morales PA-C   3 mg at 10/17/23 0122    pantoprazole (ProtoNix) EC tablet 40 mg  40 mg oral Daily before breakfast Diamante Morales PA-C        Or    pantoprazole (ProtoNix) injection 40 mg  40 mg intravenous Daily before breakfast Diamante Morales PA-C        pregabalin (Lyrica) capsule 50 mg  50 mg oral TID Diamante Morales PA-C        spironolactone (Aldactone) tablet 25 mg  25 mg oral Daily Diamante Morales PA-C   25 mg at 10/17/23 0244      LABS:  Results for orders placed or performed during the hospital encounter of 10/16/23 (from the past 24 hour(s))   Blood Gas Venous   Result Value Ref Range    POCT pH, Venous 7.42 7.33 - 7.43 pH    POCT pCO2, Venous 27 (L) 41 - 51 mm Hg    POCT pO2, Venous 53 (H) 35 - 45 mm Hg    POCT SO2, Venous 84 (H) 45 - 75 %    POCT Oxy Hemoglobin, Venous 80.8 (H) 45.0 - 75.0 %    POCT Base Excess, Venous -5.5 (L) -2.0 - 3.0 mmol/L    POCT HCO3 Calculated, Venous 17.5 (L) 22.0 - 26.0 mmol/L    Patient Temperature 37.0 degrees Celsius    FiO2 21 %    Test Comment [facility]-S    CBC and Auto Differential   Result Value Ref Range    WBC 5.0 4.4 - 11.3 x10*3/uL    nRBC 0.0 0.0 - 0.0 /100 WBCs    RBC 3.79 (L) 4.00 - 5.20 x10*6/uL    Hemoglobin 12.7 12.0 - 16.0 g/dL    Hematocrit 36.7 36.0 - 46.0 %    MCV 97 80 - 100 fL    MCH 33.5 26.0 - 34.0 pg    MCHC 34.6 32.0 - 36.0 g/dL    RDW 13.0 11.5 - 14.5 %    Platelets 227 150 - 450 x10*3/uL    MPV 10.4 7.5 - 11.5 fL    Neutrophils % 53.9 40.0 - 80.0 %    Immature Granulocytes %, Automated 0.2 0.0 - 0.9 %    Lymphocytes % 35.6 13.0 - 44.0 %    Monocytes % 10.1 2.0 - 10.0 %    Eosinophils % 0.0 0.0 - 6.0 %    Basophils % 0.2 0.0 - 2.0 %    Neutrophils Absolute 2.71 1.20 - 7.70 x10*3/uL    Immature Granulocytes Absolute, Automated 0.01 0.00 - 0.70 x10*3/uL    Lymphocytes Absolute 1.79 1.20 - 4.80 x10*3/uL    Monocytes Absolute 0.51 0.10 -  1.00 x10*3/uL    Eosinophils Absolute 0.00 0.00 - 0.70 x10*3/uL    Basophils Absolute 0.01 0.00 - 0.10 x10*3/uL   Comprehensive metabolic panel   Result Value Ref Range    Glucose 101 (H) 74 - 99 mg/dL    Sodium 138 136 - 145 mmol/L    Potassium 2.7 (LL) 3.5 - 5.3 mmol/L    Chloride 98 98 - 107 mmol/L    Bicarbonate 17 (L) 21 - 32 mmol/L    Anion Gap 26 (H) 10 - 20 mmol/L    Urea Nitrogen 4 (L) 6 - 23 mg/dL    Creatinine 0.59 0.50 - 1.05 mg/dL    eGFR >90 >60 mL/min/1.73m*2    Calcium 8.7 8.6 - 10.3 mg/dL    Albumin 4.4 3.4 - 5.0 g/dL    Alkaline Phosphatase 115 (H) 33 - 110 U/L    Total Protein 7.8 6.4 - 8.2 g/dL     (H) 9 - 39 U/L    Bilirubin, Total 0.9 0.0 - 1.2 mg/dL    ALT 61 (H) 7 - 45 U/L   Troponin I, High Sensitivity   Result Value Ref Range    Troponin I, High Sensitivity 10 0 - 13 ng/L   Ethanol   Result Value Ref Range    Alcohol 41 (H) <=10 mg/dL   Magnesium   Result Value Ref Range    Magnesium 0.80 (L) 1.60 - 2.40 mg/dL   Drug Screen, Urine   Result Value Ref Range    Amphetamine Screen, Urine Presumptive Negative Presumptive Negative    Barbiturate Screen, Urine Presumptive Negative Presumptive Negative    Benzodiazepines Screen, Urine Presumptive Negative Presumptive Negative    Cannabinoid Screen, Urine Presumptive Negative Presumptive Negative    Cocaine Metabolite Screen, Urine Presumptive Negative Presumptive Negative    Fentanyl Screen, Urine Presumptive Negative Presumptive Negative    Opiate Screen, Urine Presumptive Negative Presumptive Negative    Oxycodone Screen, Urine Presumptive Negative Presumptive Negative    PCP Screen, Urine Presumptive Negative Presumptive Negative      IMAGING:  XR chest 1 view   Final Result   1.  No focal consolidation or pleural effusion.                  MACRO:   None        Signed by: Sabi Lorenzo 10/16/2023 10:24 PM   Dictation workstation:   [id number]             PHYSICAL EXAM  /88 (Patient Position: Lying)   Pulse 94   Temp 36.4 °C  "(97.5 °F) (Temporal)   Resp 20   Ht 1.702 m (5' 7.01\")   Wt 89.3 kg (196 lb 13.9 oz)   SpO2 97%   BMI 30.83 kg/m²   PHYSICAL EXAM:  GENERAL: Alert, NAD, cooperative  SKIN: Warm and dry.  No suspicious lesions or rashes.  HEENT:  NCAT, PERRLA, EOMI, nonicteric sclera, MMM, neck supple, trachea midline  LUNGS: Unlabored on RA, symmetric chest expansion, no significant wheezing, rhonchi, or rales appreciated  CARDS: RRR, no m/g/r appreciated  GI: Soft, NTND, BS+, no rebound, no guarding   : no moran, voids independently   MS/Extremities: WWP, no edema, distal pulses intact, moves all extremities  NEURO: A&Ox3, grossly nonfocal exam, no tremors, speech fluent, follows commands, answers questions appropriately  PSYCH: mood and behavior appropriate    Assessment/Plan:   Principal Problem:    Hypokalemia  Active Problems:    Allergic rhinitis    Essential hypertension    Mild persistent asthma    Hypomagnesemia    Nicotine use    Neuropraxia of left lower extremity    Transaminitis  -CBC: no leukocytosis, no acute anemia, no thrombocytopenia  -CMP: hypokalemia 2.7, BUN  and creatinine low, bicarb  AG elevated, , ALT 61 --> AST/ALT ratio suggestive of ETOH, ETOH level elevated, ALK phos 115  -VBG: without acidosis,   -Mg 0.8 --> 4G IV magnesium ordered  -replete potassium after magnesium repletion   -ETOH level 41 --> denies history of ETOH withdraw   -HS HERNAN WNL  -CXR: no acute processes   -EKG: non ischemic  -repeat labs in AM   -resume home meds as appropriate  -BP suboptimally controlled --> may need to consider additional agent to optimize control   -encouraged smoking cessation, nicotine replacement offered. > 3 minutes spent providing smoking cessation counseling  -GI ppx: Protonix, bowel regimen  -VTE ppx: Ambulation     Total time spent [including but not limited to]: Obtaining and reviewing patient medical records/history, obtaining a separate history,  examining and assessing the patient, providing "  and education, placing pertinent orders for labs/tests/medications,  communicating with the patient/family/health team, documenting in the patient's EMR/formulation of this note, independently interpreting results and data, coordinating care:   45 minutes, with greater than 50% spent in personal discussion with patient and/or family    Diamante Morales PA-C

## 2023-10-17 NOTE — NURSING NOTE
Discharge instructions reviewed with patient and S/O. Discussed new blood pressure medication amlodipine. Pt to resume normal home medications and follow up with PCP. Patient to discharge home with significant other, no needs at this time. IV access removed prior to discharge, catheter intact. Patient stable at time of discharge.

## 2023-10-17 NOTE — ED TRIAGE NOTES
Pt took prescribed breathing treatments earlier. Is now feeling anxious and cramping. Is presently on menstrual cycle. Pt is compliant with HTN meds. BP is high this evening. A/Ox4 w/o CP SOB or N/V. Pt recalls all events and follows all commands. Resp are equal and unlabored.

## 2023-10-17 NOTE — DISCHARGE INSTRUCTIONS
Follow up with your Family Doctor.  Discuss your new amlodipine medication. Keep a log of your blood pressure.  If your blood pressure does not improve and stabilize, please contact your family doctor or return to the ED.

## 2023-10-17 NOTE — ED PROVIDER NOTES
EMERGENCY MEDICINE EVALUATION NOTE    History of Present Illness     Chief Complaint:   Chief Complaint   Patient presents with    Anxiety    Hypertension       HPI: Sharla Ramirez is a 43 y.o. female with past medical history of asthma, complex regional pain syndrome, neuropraxia, and anxiety who presents with complaint of anxiety, hypertension, shortness of breath.  Patient states over the past 24 hours she has been dealing with worsening generalized anxiety and tremors.  She states she has had hypokalemia in the past and these could be attributed to this.  She does also believe she is having an acute asthma exacerbation and has been using her home inhaler every several hours with some relief.  She does admit to a dry cough.  Notes mild associated shortness of breath and wheezing.  Denies any fever, chills, current pregnancy.  Admits current menstrual cycle.  Denies current chest pain.    Previous History   No past medical history on file.  No past surgical history on file.     No family history on file.  Allergies   Allergen Reactions    Morphine Hives     No current outpatient medications    Physical Exam     Appearance: Alert, oriented , cooperative,  in acute distress. Well nourished & well hydrated.     Skin: Intact,  dry skin, no lesions, rash, petechiae or purpura.      Eyes: PERRLA, EOMs intact,  Conjunctiva pink with no redness or exudates. Cornea & anterior chamber are clear, Eyelids without lesions. No scleral icterus.      ENT: Hearing grossly intact. External auditory canals patent, tympanic membranes intact with visible landmarks. Nares patent, mucus membranes moist. Dentition without lesions. Pharynx clear, uvula midline.      Neck: Supple, without meningismus. Thyroid not palpable. Trachea at midline. No lymphadenopathy.     Pulmonary: Clear bilaterally with good chest wall excursion. No rales, rhonchi or wheezing. No accessory muscle use or stridor.     Cardiac: Tachycardic, otherwise normal  S1, S2 without murmur, rub, gallop or extrasystole. No JVD, Carotids without bruits.     Abdomen: Soft, nontender, active bowel sounds.  No palpable organomegaly.  No rebound or guarding.  No CVA tenderness.     Genitourinary: Exam deferred.     Musculoskeletal: Full range of motion. no pain, edema, or deformity. Pulses full and equal. No cyanosis or clubbing.      Neurological:  Cranial nerves II through XII are grossly intact, finger-nose touch is normal, normal sensation, no weakness, no focal findings identified.     Psychiatric: Anxious mood and elevated affect.     Results     Labs Reviewed   CBC WITH AUTO DIFFERENTIAL - Abnormal       Result Value    WBC 5.0      nRBC 0.0      RBC 3.79 (*)     Hemoglobin 12.7      Hematocrit 36.7      MCV 97      MCH 33.5      MCHC 34.6      RDW 13.0      Platelets 227      MPV 10.4      Neutrophils % 53.9      Immature Granulocytes %, Automated 0.2      Lymphocytes % 35.6      Monocytes % 10.1      Eosinophils % 0.0      Basophils % 0.2      Neutrophils Absolute 2.71      Immature Granulocytes Absolute, Automated 0.01      Lymphocytes Absolute 1.79      Monocytes Absolute 0.51      Eosinophils Absolute 0.00      Basophils Absolute 0.01     COMPREHENSIVE METABOLIC PANEL - Abnormal    Glucose 101 (*)     Sodium 138      Potassium 2.7 (*)     Chloride 98      Bicarbonate 17 (*)     Anion Gap 26 (*)     Urea Nitrogen 4 (*)     Creatinine 0.59      eGFR >90      Calcium 8.7      Albumin 4.4      Alkaline Phosphatase 115 (*)     Total Protein 7.8       (*)     Bilirubin, Total 0.9      ALT 61 (*)    BLOOD GAS VENOUS - Abnormal    POCT pH, Venous 7.42      POCT pCO2, Venous 27 (*)     POCT pO2, Venous 53 (*)     POCT SO2, Venous 84 (*)     POCT Oxy Hemoglobin, Venous 80.8 (*)     POCT Base Excess, Venous -5.5 (*)     POCT HCO3 Calculated, Venous 17.5 (*)     Patient Temperature 37.0      FiO2 21      Test Comment [facility]-S     ALCOHOL - Abnormal    Alcohol 41 (*)     MAGNESIUM - Abnormal    Magnesium 0.80 (*)    TROPONIN I, HIGH SENSITIVITY - Normal    Troponin I, High Sensitivity 10      Narrative:     Less than 99th percentile of normal range cutoff-  Female and children under 18 years old <14 ng/L; Male <21 ng/L: Negative  Repeat testing should be performed if clinically indicated.     Female and children under 18 years old 14-50 ng/L; Male 21-50 ng/L:  Consistent with possible cardiac damage and possible increased clinical   risk. Serial measurements may help to assess extent of myocardial damage.     >50 ng/L: Consistent with cardiac damage, increased clinical risk and  myocardial infarction. Serial measurements may help assess extent of   myocardial damage.      NOTE: Children less than 1 year old may have higher baseline troponin   levels and results should be interpreted in conjunction with the overall   clinical context.     NOTE: Troponin I testing is performed using a different   testing methodology at [facility] than at other   [facility]. Direct result comparisons should only   be made within the same method.   DRUG SCREEN,URINE     XR chest 1 view   Final Result   1.  No focal consolidation or pleural effusion.                  MACRO:   None        Signed by: Sabi Lorenzo 10/16/2023 10:24 PM   Dictation workstation:   [id number]            ED Course & Medical Decision Making     Medications   potassium chloride 20 mEq in 100 mL IV premix (20 mEq intravenous New Bag 10/16/23 2229)   LORazepam (Ativan) injection 2 mg (2 mg intravenous Given 10/16/23 2139)   albuterol 2.5 mg /3 mL (0.083 %) nebulizer solution 2.5 mg (2.5 mg nebulization Given 10/16/23 2205)   potassium chloride (Klor-Con) packet 40 mEq (40 mEq oral Given 10/16/23 2224)     Diagnoses as of 10/16/23 2311   Anxiety disorder, unspecified type   Shortness of breath   Hypertension, unspecified type   Hypokalemia     Heart Rate:  []   Temp:  [36.5 °C (97.7 °F)]   Resp:   "[17-26]   BP: (157-204)/(105-122)   Height:  [170.2 cm (5' 7\")]   Weight:  [81.6 kg (180 lb)]   SpO2:  [95 %-99 %]      Sharla Ramirez is a 43 y.o. female with past medical history of asthma who presents with complaint of anxiety, hypertension, shortness of breath.  Patient was initially hypertensive and tachycardic with a heart rate of 112 and BP of 204/114.  Afebrile and saturating 99% room air.  She did request a breathing treatment.  Otherwise clear breath sounds noted.  Patient was severely anxious at bedside of the no SI or HI.  Differential includes but is not limited to worsening anxiety disorder and panic attack, metabolic abnormality, dehydration, pneumonia, other intrathoracic pathology.  EKG showing sinus tachycardia no significant acute ischemic change or arrhythmia.  Lab work did show significant hypokalemia of 2.7 and patient does have a history of recurrent hypokalemia with similar symptoms in the past.  Magnesium level still pending.  Anion gap of 26 with otherwise normal-appearing glucose and low concern for DKA at this time.  She does have a transaminitis with a AST of 128 does admit to frequent alcohol usage although states it is not daily and denies history of withdrawal in the past.  Low concern for active withdrawal.  No leukocytosis or anemia noted.  Venous blood gas nonacute.  Patient was given IV Ativan for treatment as well as a breathing treatment.  She was treated with IV and oral potassium.  Chest x-ray is nonacute.  Patient for these findings and did request possible admission for observation overnight given her still recurrent hypokalemia and symptoms with before every potassium replacement.    Procedures   Procedures    Diagnosis     1. Hypokalemia    2. Anxiety disorder, unspecified type    3. Shortness of breath    4. Hypertension, unspecified type        Disposition     Admitted to hospitalist team, discussed differential and findings with patient as well as any family members " at bedside.      ED Prescriptions    None            Deng Trotter DO  10/16/23 4977

## 2023-10-17 NOTE — CARE PLAN
The patient's goals for the shift include rest, pain management, and breathing management.     The clinical goals for the shift include: control breathing, nebulizer treatments PRN, and pain management.    Problem: Respiratory  Goal: Minimal/no exertional discomfort or dyspnea this shift  Outcome: Progressing     Problem: Respiratory  Goal: No signs of respiratory distress (eg. Use of accessory muscles. Peds grunting)  Outcome: Progressing

## 2023-10-17 NOTE — CARE PLAN
The patient's goals for the shift include      The clinical goals for the shift include control breathing.

## 2023-12-08 ENCOUNTER — HOSPITAL ENCOUNTER (EMERGENCY)
Facility: HOSPITAL | Age: 43
Discharge: HOME | End: 2023-12-09
Attending: EMERGENCY MEDICINE
Payer: COMMERCIAL

## 2023-12-08 DIAGNOSIS — R00.0 TACHYCARDIA: ICD-10-CM

## 2023-12-08 DIAGNOSIS — E87.6 HYPOKALEMIA: Primary | ICD-10-CM

## 2023-12-08 DIAGNOSIS — E83.39 HYPOPHOSPHATEMIA: ICD-10-CM

## 2023-12-08 DIAGNOSIS — E83.42 HYPOMAGNESEMIA: ICD-10-CM

## 2023-12-08 LAB
ALBUMIN SERPL BCP-MCNC: 4.1 G/DL (ref 3.4–5)
ALP SERPL-CCNC: 126 U/L (ref 33–110)
ALT SERPL W P-5'-P-CCNC: 28 U/L (ref 7–45)
ANION GAP SERPL CALC-SCNC: 24 MMOL/L (ref 10–20)
AST SERPL W P-5'-P-CCNC: 78 U/L (ref 9–39)
B-HCG SERPL-ACNC: <2 MIU/ML
BASOPHILS # BLD AUTO: 0.02 X10*3/UL (ref 0–0.1)
BASOPHILS NFR BLD AUTO: 0.3 %
BILIRUB SERPL-MCNC: 0.7 MG/DL (ref 0–1.2)
BUN SERPL-MCNC: 9 MG/DL (ref 6–23)
CALCIUM SERPL-MCNC: 8.6 MG/DL (ref 8.6–10.3)
CHLORIDE SERPL-SCNC: 97 MMOL/L (ref 98–107)
CO2 SERPL-SCNC: 19 MMOL/L (ref 21–32)
CREAT SERPL-MCNC: 0.88 MG/DL (ref 0.5–1.05)
EOSINOPHIL # BLD AUTO: 0.02 X10*3/UL (ref 0–0.7)
EOSINOPHIL NFR BLD AUTO: 0.3 %
ERYTHROCYTE [DISTWIDTH] IN BLOOD BY AUTOMATED COUNT: 14.1 % (ref 11.5–14.5)
GFR SERPL CREATININE-BSD FRML MDRD: 85 ML/MIN/1.73M*2
GLUCOSE SERPL-MCNC: 127 MG/DL (ref 74–99)
HCT VFR BLD AUTO: 37.1 % (ref 36–46)
HGB BLD-MCNC: 13 G/DL (ref 12–16)
IMM GRANULOCYTES # BLD AUTO: 0.02 X10*3/UL (ref 0–0.7)
IMM GRANULOCYTES NFR BLD AUTO: 0.3 % (ref 0–0.9)
LYMPHOCYTES # BLD AUTO: 2.7 X10*3/UL (ref 1.2–4.8)
LYMPHOCYTES NFR BLD AUTO: 34 %
MAGNESIUM SERPL-MCNC: 0.9 MG/DL (ref 1.6–2.4)
MCH RBC QN AUTO: 34.1 PG (ref 26–34)
MCHC RBC AUTO-ENTMCNC: 35 G/DL (ref 32–36)
MCV RBC AUTO: 97 FL (ref 80–100)
MONOCYTES # BLD AUTO: 0.75 X10*3/UL (ref 0.1–1)
MONOCYTES NFR BLD AUTO: 9.4 %
NEUTROPHILS # BLD AUTO: 4.43 X10*3/UL (ref 1.2–7.7)
NEUTROPHILS NFR BLD AUTO: 55.7 %
NRBC BLD-RTO: 0 /100 WBCS (ref 0–0)
PHOSPHATE SERPL-MCNC: <1 MG/DL (ref 2.5–4.9)
PLATELET # BLD AUTO: 252 X10*3/UL (ref 150–450)
POTASSIUM SERPL-SCNC: 2.9 MMOL/L (ref 3.5–5.3)
PROT SERPL-MCNC: 7.6 G/DL (ref 6.4–8.2)
RBC # BLD AUTO: 3.81 X10*6/UL (ref 4–5.2)
SODIUM SERPL-SCNC: 137 MMOL/L (ref 136–145)
WBC # BLD AUTO: 7.9 X10*3/UL (ref 4.4–11.3)

## 2023-12-08 PROCEDURE — 96368 THER/DIAG CONCURRENT INF: CPT

## 2023-12-08 PROCEDURE — 84702 CHORIONIC GONADOTROPIN TEST: CPT | Performed by: EMERGENCY MEDICINE

## 2023-12-08 PROCEDURE — 2500000004 HC RX 250 GENERAL PHARMACY W/ HCPCS (ALT 636 FOR OP/ED): Performed by: EMERGENCY MEDICINE

## 2023-12-08 PROCEDURE — 36415 COLL VENOUS BLD VENIPUNCTURE: CPT | Performed by: EMERGENCY MEDICINE

## 2023-12-08 PROCEDURE — 80053 COMPREHEN METABOLIC PANEL: CPT | Performed by: EMERGENCY MEDICINE

## 2023-12-08 PROCEDURE — 83735 ASSAY OF MAGNESIUM: CPT | Performed by: EMERGENCY MEDICINE

## 2023-12-08 PROCEDURE — 84100 ASSAY OF PHOSPHORUS: CPT | Performed by: EMERGENCY MEDICINE

## 2023-12-08 PROCEDURE — 96375 TX/PRO/DX INJ NEW DRUG ADDON: CPT

## 2023-12-08 PROCEDURE — 96361 HYDRATE IV INFUSION ADD-ON: CPT

## 2023-12-08 PROCEDURE — 96367 TX/PROPH/DG ADDL SEQ IV INF: CPT

## 2023-12-08 PROCEDURE — 85025 COMPLETE CBC W/AUTO DIFF WBC: CPT | Performed by: EMERGENCY MEDICINE

## 2023-12-08 PROCEDURE — 99284 EMERGENCY DEPT VISIT MOD MDM: CPT | Mod: 25 | Performed by: EMERGENCY MEDICINE

## 2023-12-08 PROCEDURE — 96366 THER/PROPH/DIAG IV INF ADDON: CPT

## 2023-12-08 RX ORDER — POTASSIUM CHLORIDE 20 MEQ/1
40 TABLET, EXTENDED RELEASE ORAL ONCE
Status: COMPLETED | OUTPATIENT
Start: 2023-12-08 | End: 2023-12-08

## 2023-12-08 RX ORDER — LORAZEPAM 2 MG/ML
2 INJECTION INTRAMUSCULAR ONCE
Status: COMPLETED | OUTPATIENT
Start: 2023-12-08 | End: 2023-12-08

## 2023-12-08 RX ORDER — HYDROMORPHONE HYDROCHLORIDE 1 MG/ML
1 INJECTION, SOLUTION INTRAMUSCULAR; INTRAVENOUS; SUBCUTANEOUS EVERY 30 MIN PRN
Status: DISCONTINUED | OUTPATIENT
Start: 2023-12-08 | End: 2023-12-09 | Stop reason: HOSPADM

## 2023-12-08 RX ORDER — POTASSIUM CHLORIDE 20 MEQ/1
40 TABLET, EXTENDED RELEASE ORAL DAILY
Status: DISCONTINUED | OUTPATIENT
Start: 2023-12-09 | End: 2023-12-08

## 2023-12-08 RX ORDER — MAGNESIUM SULFATE HEPTAHYDRATE 40 MG/ML
4 INJECTION, SOLUTION INTRAVENOUS ONCE
Status: COMPLETED | OUTPATIENT
Start: 2023-12-08 | End: 2023-12-09

## 2023-12-08 RX ORDER — POTASSIUM CHLORIDE 14.9 MG/ML
20 INJECTION INTRAVENOUS
Status: COMPLETED | OUTPATIENT
Start: 2023-12-08 | End: 2023-12-09

## 2023-12-08 RX ORDER — ONDANSETRON HYDROCHLORIDE 2 MG/ML
4 INJECTION, SOLUTION INTRAVENOUS EVERY 30 MIN PRN
Status: DISCONTINUED | OUTPATIENT
Start: 2023-12-08 | End: 2023-12-09 | Stop reason: HOSPADM

## 2023-12-08 RX ADMIN — SODIUM CHLORIDE, POTASSIUM CHLORIDE, SODIUM LACTATE AND CALCIUM CHLORIDE 2000 ML: 600; 310; 30; 20 INJECTION, SOLUTION INTRAVENOUS at 21:29

## 2023-12-08 RX ADMIN — MAGNESIUM SULFATE HEPTAHYDRATE 4 G: 40 INJECTION, SOLUTION INTRAVENOUS at 22:43

## 2023-12-08 RX ADMIN — POTASSIUM CHLORIDE 40 MEQ: 1500 TABLET, EXTENDED RELEASE ORAL at 22:49

## 2023-12-08 RX ADMIN — ONDANSETRON 4 MG: 2 INJECTION INTRAMUSCULAR; INTRAVENOUS at 21:32

## 2023-12-08 RX ADMIN — HYDROMORPHONE HYDROCHLORIDE 1 MG: 1 INJECTION, SOLUTION INTRAMUSCULAR; INTRAVENOUS; SUBCUTANEOUS at 21:32

## 2023-12-08 RX ADMIN — LORAZEPAM 2 MG: 2 INJECTION INTRAMUSCULAR; INTRAVENOUS at 21:47

## 2023-12-08 RX ADMIN — POTASSIUM CHLORIDE 20 MEQ: 14.9 INJECTION, SOLUTION INTRAVENOUS at 22:42

## 2023-12-08 ASSESSMENT — ENCOUNTER SYMPTOMS
VOMITING: 0
BACK PAIN: 0
COUGH: 0
DYSURIA: 0
TREMORS: 1
MYALGIAS: 1
SORE THROAT: 0
FEVER: 0
CHILLS: 0
SHORTNESS OF BREATH: 0
ARTHRALGIAS: 0
PALPITATIONS: 1
ABDOMINAL PAIN: 0
SEIZURES: 0
COLOR CHANGE: 0
HEMATURIA: 0
EYE PAIN: 0

## 2023-12-08 ASSESSMENT — PAIN SCALES - GENERAL: PAINLEVEL_OUTOF10: 10 - WORST POSSIBLE PAIN

## 2023-12-08 ASSESSMENT — COLUMBIA-SUICIDE SEVERITY RATING SCALE - C-SSRS
1. IN THE PAST MONTH, HAVE YOU WISHED YOU WERE DEAD OR WISHED YOU COULD GO TO SLEEP AND NOT WAKE UP?: NO
2. HAVE YOU ACTUALLY HAD ANY THOUGHTS OF KILLING YOURSELF?: NO
6. HAVE YOU EVER DONE ANYTHING, STARTED TO DO ANYTHING, OR PREPARED TO DO ANYTHING TO END YOUR LIFE?: NO

## 2023-12-08 ASSESSMENT — PAIN DESCRIPTION - FREQUENCY: FREQUENCY: CONSTANT/CONTINUOUS

## 2023-12-08 ASSESSMENT — PAIN - FUNCTIONAL ASSESSMENT: PAIN_FUNCTIONAL_ASSESSMENT: 0-10

## 2023-12-08 ASSESSMENT — PAIN DESCRIPTION - DESCRIPTORS: DESCRIPTORS: CRAMPING

## 2023-12-08 ASSESSMENT — PAIN DESCRIPTION - LOCATION: LOCATION: LEG

## 2023-12-09 VITALS
SYSTOLIC BLOOD PRESSURE: 124 MMHG | HEART RATE: 86 BPM | TEMPERATURE: 97.8 F | OXYGEN SATURATION: 97 % | WEIGHT: 196 LBS | RESPIRATION RATE: 18 BRPM | DIASTOLIC BLOOD PRESSURE: 85 MMHG | HEIGHT: 67 IN | BODY MASS INDEX: 30.76 KG/M2

## 2023-12-09 LAB
APPEARANCE UR: CLEAR
BILIRUB UR STRIP.AUTO-MCNC: NEGATIVE MG/DL
COLOR UR: YELLOW
GLUCOSE UR STRIP.AUTO-MCNC: NEGATIVE MG/DL
KETONES UR STRIP.AUTO-MCNC: NEGATIVE MG/DL
LEUKOCYTE ESTERASE UR QL STRIP.AUTO: NEGATIVE
NITRITE UR QL STRIP.AUTO: NEGATIVE
PH UR STRIP.AUTO: 5 [PH]
PROT UR STRIP.AUTO-MCNC: NEGATIVE MG/DL
RBC # UR STRIP.AUTO: NEGATIVE /UL
SP GR UR STRIP.AUTO: 1.01
UROBILINOGEN UR STRIP.AUTO-MCNC: <2 MG/DL

## 2023-12-09 PROCEDURE — 2500000004 HC RX 250 GENERAL PHARMACY W/ HCPCS (ALT 636 FOR OP/ED): Performed by: EMERGENCY MEDICINE

## 2023-12-09 PROCEDURE — 2500000005 HC RX 250 GENERAL PHARMACY W/O HCPCS: Performed by: EMERGENCY MEDICINE

## 2023-12-09 PROCEDURE — 96365 THER/PROPH/DIAG IV INF INIT: CPT

## 2023-12-09 PROCEDURE — 96366 THER/PROPH/DIAG IV INF ADDON: CPT

## 2023-12-09 PROCEDURE — 96368 THER/DIAG CONCURRENT INF: CPT

## 2023-12-09 PROCEDURE — 81003 URINALYSIS AUTO W/O SCOPE: CPT | Performed by: EMERGENCY MEDICINE

## 2023-12-09 RX ORDER — POTASSIUM CHLORIDE 20 MEQ/1
20 TABLET, EXTENDED RELEASE ORAL 2 TIMES DAILY
Qty: 30 TABLET | Refills: 0 | Status: SHIPPED | OUTPATIENT
Start: 2023-12-09 | End: 2023-12-24

## 2023-12-09 RX ORDER — CALCIUM CARBONATE 300MG(750)
400 TABLET,CHEWABLE ORAL 2 TIMES DAILY
Qty: 30 TABLET | Refills: 0 | Status: SHIPPED | OUTPATIENT
Start: 2023-12-09 | End: 2023-12-24

## 2023-12-09 RX ADMIN — POTASSIUM PHOSPHATE, MONOBASIC POTASSIUM PHOSPHATE, DIBASIC 30 MMOL: 224; 236 INJECTION, SOLUTION, CONCENTRATE INTRAVENOUS at 04:36

## 2023-12-09 RX ADMIN — POTASSIUM CHLORIDE 20 MEQ: 14.9 INJECTION, SOLUTION INTRAVENOUS at 03:03

## 2023-12-09 NOTE — ED TRIAGE NOTES
Pt arrived to the ED via [facility] with a chief complaint of muscle cramps. Pt endorses a hx of problems with low potassium. Pulse 166 upon arrival and anxious. 12 lead was sinus tach with no abnormalities. 10/10 pain. Denies chest pain or sob. Bgl 147. Afebrile abcs intact.

## 2023-12-09 NOTE — ED PROVIDER NOTES
HPI   Chief Complaint   Patient presents with    Muscle Cramps         43-year-old female, apparent history of significant hypokalemia, hypomagnesemia uncertain cause, on potassium supplementation, underlying hypertension with alteration of medications multiple times due to the hypokalemia.  Presents with symptoms similar to prior episodes of hypokalemia with diffuse achiness, tachycardia, cramps throughout the body.  No nausea or vomiting.  Intermittent constipation and diarrhea but no overt change in bowel or bladder function.  No significant urinary changes.  No chest pain per se.  No shortness of breath cough sputum or hemoptysis with it.                          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  No family history on file.  Social History     Tobacco Use    Smoking status: Every Day     Types: Cigarettes    Smokeless tobacco: Never   Substance Use Topics    Alcohol use: Yes    Drug use: Not Currently       Review of Systems   Constitutional:  Negative for chills and fever.   HENT:  Negative for ear pain and sore throat.    Eyes:  Negative for pain and visual disturbance.   Respiratory:  Negative for cough and shortness of breath.    Cardiovascular:  Positive for palpitations. Negative for chest pain.   Gastrointestinal:  Negative for abdominal pain and vomiting.   Genitourinary:  Negative for dysuria and hematuria.   Musculoskeletal:  Positive for myalgias. Negative for arthralgias and back pain.        Chronic left ankle pain.   Skin:  Negative for color change and rash.   Neurological:  Positive for tremors. Negative for seizures and syncope.   Psychiatric/Behavioral:          Anxiety appearance.   All other systems reviewed and are negative.        Physical Exam   ED Triage Vitals   Temp Pulse Resp BP   -- -- -- --      SpO2 Temp src Heart Rate Source Patient Position   -- -- -- --      BP Location FiO2 (%)     -- --       Physical Exam  Vitals reviewed.   Constitutional:       General: She is not in acute distress.     Appearance: She is well-developed.   HENT:      Head: Normocephalic and atraumatic.      Right Ear: External ear normal.      Left Ear: External ear normal.      Nose: Nose normal.      Mouth/Throat:      Mouth: Mucous membranes are moist.      Pharynx: Oropharynx is clear.   Eyes:      Conjunctiva/sclera: Conjunctivae normal.      Pupils: Pupils are equal, round, and reactive to light.   Neck:      Vascular: No JVD.   Cardiovascular:      Rate and Rhythm: Regular rhythm. Tachycardia present.      Pulses: Normal pulses.   Pulmonary:      Effort: Pulmonary effort is normal. No accessory muscle usage or respiratory distress.      Breath sounds: Normal breath sounds.   Abdominal:      General: Abdomen is flat. There is no distension.      Palpations: Abdomen is soft. There is no mass.      Tenderness: There is no abdominal tenderness.   Musculoskeletal:         General: Normal range of motion.      Cervical back: Normal range of motion and neck supple.      Comments: Chronic left ankle discomfort, mild swelling.  Nothing acute.  No recent injuries.  No pain along the venous return system.   Lymphadenopathy:      Cervical: No cervical adenopathy.   Skin:     General: Skin is warm and dry.      Capillary Refill: Capillary refill takes less than 2 seconds.      Comments: No zoster rash seen   Neurological:      General: No focal deficit present.      Mental Status: She is alert. Mental status is at baseline.   Psychiatric:         Mood and Affect: Mood normal.                 ECG if performed, ordered and interpreted by ED physician:        Labs Reviewed   CBC WITH AUTO DIFFERENTIAL - Abnormal       Result Value    WBC 7.9      nRBC 0.0      RBC  3.81 (*)     Hemoglobin 13.0      Hematocrit 37.1      MCV 97      MCH 34.1 (*)     MCHC 35.0      RDW 14.1      Platelets 252      Neutrophils % 55.7      Immature Granulocytes %, Automated 0.3      Lymphocytes % 34.0      Monocytes % 9.4      Eosinophils % 0.3      Basophils % 0.3      Neutrophils Absolute 4.43      Immature Granulocytes Absolute, Automated 0.02      Lymphocytes Absolute 2.70      Monocytes Absolute 0.75      Eosinophils Absolute 0.02      Basophils Absolute 0.02     COMPREHENSIVE METABOLIC PANEL - Abnormal    Glucose 127 (*)     Sodium 137      Potassium 2.9 (*)     Chloride 97 (*)     Bicarbonate 19 (*)     Anion Gap 24 (*)     Urea Nitrogen 9      Creatinine 0.88      eGFR 85      Calcium 8.6      Albumin 4.1      Alkaline Phosphatase 126 (*)     Total Protein 7.6      AST 78 (*)     Bilirubin, Total 0.7      ALT 28     MAGNESIUM - Abnormal    Magnesium 0.90 (*)    PHOSPHORUS - Abnormal    Phosphorus <1.0 (*)    HUMAN CHORIONIC GONADOTROPIN, SERUM QUANTITATIVE - Normal    HCG, Beta-Quantitative <2      Narrative:      Total HCG measurement is performed using the [employer] Access   Immunoassay which detects intact HCG and free beta HCG subunit.    This test is not indicated for use as a tumor marker.   HCG testing is performed using a different test methodology at [facility] than other [facility]. Direct result comparison   should only be made within the same method.       URINALYSIS WITH REFLEX MICROSCOPIC AND CULTURE          No orders to display            ED Course & MDM     ED Medication Administration from 12/08/2023 2043 to 12/09/2023 0118         Date/Time Order Dose Route Action Action by     12/08/2023 2129 EST lactated Ringer's bolus 2,000 mL 2,000 mL intravenous New Bag HERACLIO Palm     12/08/2023 2132 EST HYDROmorphone (Dilaudid) injection 1 mg 1 mg intravenous Given HERACLIO Palm     12/08/2023 2132 EST ondansetron (Zofran) injection 4 mg 4 mg intravenous Given  HERACLIO Palm     12/08/2023 2147 EST LORazepam (Ativan) injection 2 mg 2 mg intravenous Given Derramas, P     12/08/2023 2242 EST potassium chloride 20 mEq in 100 mL IV premix 20 mEq intravenous New Bag Derramas, P     12/08/2023 2243 EST magnesium sulfate IV 4 g 4 g intravenous New Bag Derramas, P     12/08/2023 2249 EST potassium chloride CR (Klor-Con M20) ER tablet 40 mEq 40 mEq oral Given Derramas, P                   ED Course as of 12/09/23 0118   Fri Dec 08, 2023   2141 ECG 12 Lead  EKG ordered and interpreted by ED physician shows significant artifact, but sinus tachycardia, 166 bpm.  Nonspecific ST-T wave abnormalities. [KS]   2223 Hypokalemia 2.9 and hypomagnesemia at 0.9 noted.  Replenishment ordered. [KS]      ED Course User Index  [KS] Lai Adams MD MPH         Diagnoses as of 12/09/23 0118   Hypokalemia   Hypomagnesemia   Hypophosphatemia   Tachycardia       Medical Decision Making  Patient with symptoms of electrolyte disturbance that she has had in the past.  Treat pain, assess electrolytes, she says most recently was 3.1 potassium.  Will replenish as needed.  Hydration.  Antiemetics if needed.  Reassess.    Potassium low.  Magnesium low.  Phosphorus low.  Repletion ordered.  Will recheck patient clinically after fluids and electrolyte replacement.  Likely safe for discharge if vital signs improved.            Procedure  Procedures         Lai Adams MD MPH  12/09/23 0118

## 2024-01-02 ENCOUNTER — ANCILLARY PROCEDURE (OUTPATIENT)
Dept: RADIOLOGY | Facility: CLINIC | Age: 44
End: 2024-01-02
Payer: COMMERCIAL

## 2024-01-02 DIAGNOSIS — S90.32XA CONTUSION OF LEFT FOOT, INITIAL ENCOUNTER: ICD-10-CM

## 2024-01-02 PROCEDURE — 78315 BONE IMAGING 3 PHASE: CPT | Performed by: RADIOLOGY

## 2024-01-02 PROCEDURE — A9503 TC99M MEDRONATE: HCPCS

## 2024-01-02 PROCEDURE — 78315 BONE IMAGING 3 PHASE: CPT

## 2024-01-02 PROCEDURE — 3430000001 HC RX 343 DIAGNOSTIC RADIOPHARMACEUTICALS

## 2024-01-02 RX ADMIN — TECHNETIUM TC 99M MEDRONATE 27.1 MILLICURIE: 25 INJECTION, POWDER, FOR SOLUTION INTRAVENOUS at 11:49

## 2024-01-13 ENCOUNTER — HOSPITAL ENCOUNTER (EMERGENCY)
Facility: HOSPITAL | Age: 44
Discharge: HOME | End: 2024-01-13
Attending: STUDENT IN AN ORGANIZED HEALTH CARE EDUCATION/TRAINING PROGRAM
Payer: COMMERCIAL

## 2024-01-13 ENCOUNTER — APPOINTMENT (OUTPATIENT)
Dept: CARDIOLOGY | Facility: HOSPITAL | Age: 44
End: 2024-01-13
Payer: COMMERCIAL

## 2024-01-13 VITALS
OXYGEN SATURATION: 99 % | HEART RATE: 84 BPM | SYSTOLIC BLOOD PRESSURE: 126 MMHG | WEIGHT: 170 LBS | RESPIRATION RATE: 16 BRPM | DIASTOLIC BLOOD PRESSURE: 82 MMHG | BODY MASS INDEX: 26.68 KG/M2 | TEMPERATURE: 98.7 F | HEIGHT: 67 IN

## 2024-01-13 DIAGNOSIS — R20.2 PARESTHESIAS: ICD-10-CM

## 2024-01-13 DIAGNOSIS — E83.42 HYPOMAGNESEMIA: Primary | ICD-10-CM

## 2024-01-13 LAB
ALBUMIN SERPL BCP-MCNC: 3.9 G/DL (ref 3.4–5)
ALP SERPL-CCNC: 140 U/L (ref 33–110)
ALT SERPL W P-5'-P-CCNC: 81 U/L (ref 7–45)
ANION GAP SERPL CALC-SCNC: 18 MMOL/L (ref 10–20)
AST SERPL W P-5'-P-CCNC: 122 U/L (ref 9–39)
BASOPHILS # BLD AUTO: 0.01 X10*3/UL (ref 0–0.1)
BASOPHILS NFR BLD AUTO: 0.2 %
BILIRUB SERPL-MCNC: 1 MG/DL (ref 0–1.2)
BUN SERPL-MCNC: 5 MG/DL (ref 6–23)
CALCIUM SERPL-MCNC: 8.8 MG/DL (ref 8.6–10.3)
CHLORIDE SERPL-SCNC: 98 MMOL/L (ref 98–107)
CO2 SERPL-SCNC: 25 MMOL/L (ref 21–32)
CREAT SERPL-MCNC: 0.6 MG/DL (ref 0.5–1.05)
EGFRCR SERPLBLD CKD-EPI 2021: >90 ML/MIN/1.73M*2
EOSINOPHIL # BLD AUTO: 0.02 X10*3/UL (ref 0–0.7)
EOSINOPHIL NFR BLD AUTO: 0.4 %
ERYTHROCYTE [DISTWIDTH] IN BLOOD BY AUTOMATED COUNT: 13.2 % (ref 11.5–14.5)
GLUCOSE SERPL-MCNC: 126 MG/DL (ref 74–99)
HCT VFR BLD AUTO: 36.4 % (ref 36–46)
HGB BLD-MCNC: 12.4 G/DL (ref 12–16)
IMM GRANULOCYTES # BLD AUTO: 0.02 X10*3/UL (ref 0–0.7)
IMM GRANULOCYTES NFR BLD AUTO: 0.4 % (ref 0–0.9)
LYMPHOCYTES # BLD AUTO: 1.76 X10*3/UL (ref 1.2–4.8)
LYMPHOCYTES NFR BLD AUTO: 36.4 %
MAGNESIUM SERPL-MCNC: 1 MG/DL (ref 1.6–2.4)
MCH RBC QN AUTO: 34.3 PG (ref 26–34)
MCHC RBC AUTO-ENTMCNC: 34.1 G/DL (ref 32–36)
MCV RBC AUTO: 101 FL (ref 80–100)
MONOCYTES # BLD AUTO: 0.47 X10*3/UL (ref 0.1–1)
MONOCYTES NFR BLD AUTO: 9.7 %
NEUTROPHILS # BLD AUTO: 2.55 X10*3/UL (ref 1.2–7.7)
NEUTROPHILS NFR BLD AUTO: 52.9 %
NRBC BLD-RTO: 0 /100 WBCS (ref 0–0)
PLATELET # BLD AUTO: 239 X10*3/UL (ref 150–450)
POTASSIUM SERPL-SCNC: 3.5 MMOL/L (ref 3.5–5.3)
PROT SERPL-MCNC: 6.8 G/DL (ref 6.4–8.2)
RBC # BLD AUTO: 3.61 X10*6/UL (ref 4–5.2)
SODIUM SERPL-SCNC: 137 MMOL/L (ref 136–145)
WBC # BLD AUTO: 4.8 X10*3/UL (ref 4.4–11.3)

## 2024-01-13 PROCEDURE — 93005 ELECTROCARDIOGRAM TRACING: CPT

## 2024-01-13 PROCEDURE — 96375 TX/PRO/DX INJ NEW DRUG ADDON: CPT

## 2024-01-13 PROCEDURE — 2500000004 HC RX 250 GENERAL PHARMACY W/ HCPCS (ALT 636 FOR OP/ED): Performed by: STUDENT IN AN ORGANIZED HEALTH CARE EDUCATION/TRAINING PROGRAM

## 2024-01-13 PROCEDURE — 96361 HYDRATE IV INFUSION ADD-ON: CPT

## 2024-01-13 PROCEDURE — 99284 EMERGENCY DEPT VISIT MOD MDM: CPT | Mod: 25

## 2024-01-13 PROCEDURE — 96366 THER/PROPH/DIAG IV INF ADDON: CPT

## 2024-01-13 PROCEDURE — 83735 ASSAY OF MAGNESIUM: CPT | Performed by: STUDENT IN AN ORGANIZED HEALTH CARE EDUCATION/TRAINING PROGRAM

## 2024-01-13 PROCEDURE — 99284 EMERGENCY DEPT VISIT MOD MDM: CPT | Performed by: STUDENT IN AN ORGANIZED HEALTH CARE EDUCATION/TRAINING PROGRAM

## 2024-01-13 PROCEDURE — 96365 THER/PROPH/DIAG IV INF INIT: CPT

## 2024-01-13 PROCEDURE — 85025 COMPLETE CBC W/AUTO DIFF WBC: CPT | Performed by: STUDENT IN AN ORGANIZED HEALTH CARE EDUCATION/TRAINING PROGRAM

## 2024-01-13 PROCEDURE — 80053 COMPREHEN METABOLIC PANEL: CPT | Performed by: STUDENT IN AN ORGANIZED HEALTH CARE EDUCATION/TRAINING PROGRAM

## 2024-01-13 RX ORDER — MAGNESIUM SULFATE HEPTAHYDRATE 40 MG/ML
2 INJECTION, SOLUTION INTRAVENOUS ONCE
Status: COMPLETED | OUTPATIENT
Start: 2024-01-13 | End: 2024-01-13

## 2024-01-13 RX ORDER — DROPERIDOL 2.5 MG/ML
2.5 INJECTION, SOLUTION INTRAMUSCULAR; INTRAVENOUS ONCE
Status: COMPLETED | OUTPATIENT
Start: 2024-01-13 | End: 2024-01-13

## 2024-01-13 RX ORDER — ONDANSETRON HYDROCHLORIDE 2 MG/ML
4 INJECTION, SOLUTION INTRAVENOUS ONCE
Status: COMPLETED | OUTPATIENT
Start: 2024-01-13 | End: 2024-01-13

## 2024-01-13 RX ORDER — FENTANYL CITRATE 50 UG/ML
50 INJECTION, SOLUTION INTRAMUSCULAR; INTRAVENOUS ONCE
Status: COMPLETED | OUTPATIENT
Start: 2024-01-13 | End: 2024-01-13

## 2024-01-13 RX ORDER — DROPERIDOL 2.5 MG/ML
2.5 INJECTION, SOLUTION INTRAMUSCULAR; INTRAVENOUS ONCE
Status: DISCONTINUED | OUTPATIENT
Start: 2024-01-13 | End: 2024-01-13

## 2024-01-13 RX ADMIN — DROPERIDOL 2.5 MG: 2.5 INJECTION, SOLUTION INTRAMUSCULAR; INTRAVENOUS at 21:03

## 2024-01-13 RX ADMIN — MAGNESIUM SULFATE IN WATER 2 G: 40 INJECTION, SOLUTION INTRAVENOUS at 21:07

## 2024-01-13 RX ADMIN — ONDANSETRON 4 MG: 2 INJECTION INTRAMUSCULAR; INTRAVENOUS at 19:31

## 2024-01-13 RX ADMIN — SODIUM CHLORIDE 1000 ML: 9 INJECTION, SOLUTION INTRAVENOUS at 19:45

## 2024-01-13 RX ADMIN — FENTANYL CITRATE 50 MCG: 50 INJECTION INTRAMUSCULAR; INTRAVENOUS at 19:31

## 2024-01-13 ASSESSMENT — PAIN - FUNCTIONAL ASSESSMENT: PAIN_FUNCTIONAL_ASSESSMENT: 0-10

## 2024-01-13 ASSESSMENT — COLUMBIA-SUICIDE SEVERITY RATING SCALE - C-SSRS
6. HAVE YOU EVER DONE ANYTHING, STARTED TO DO ANYTHING, OR PREPARED TO DO ANYTHING TO END YOUR LIFE?: NO
2. HAVE YOU ACTUALLY HAD ANY THOUGHTS OF KILLING YOURSELF?: NO
1. IN THE PAST MONTH, HAVE YOU WISHED YOU WERE DEAD OR WISHED YOU COULD GO TO SLEEP AND NOT WAKE UP?: NO

## 2024-01-13 ASSESSMENT — PAIN SCALES - GENERAL
PAINLEVEL_OUTOF10: 10 - WORST POSSIBLE PAIN
PAINLEVEL_OUTOF10: 5 - MODERATE PAIN

## 2024-01-14 NOTE — ED PROVIDER NOTES
EMERGENCY MEDICINE EVALUATION NOTE    History of Present Illness     Chief Complaint:   Chief Complaint   Patient presents with    Foot Injury     Pt arrived from home via [place] [facility] for left foot pain. Pt hurt her left foot last year in October at work. Pt did not fracture anything. Pt has a post op shoe on. Pt using crutches. Pt states increased pain. No other complaints handy.       HPI: Sharla Ramirez is a 43 y.o. female with past medical history of hypothyroidism, hypertension, chronic hypokalemia and hypomagnesemia who presents with complaint of possible hypokalemia.  Patient states she does have a chronic injury to her left foot in October of last year which happened while at work.  She denies any new injury or fall.  She does states she wears a postoperative shoe and did not have a fracture from imaging completed after the injury.  She does also use crutches.  She states over the past several days she is having worsening bilateral lower extremity pain and paresthesias with increased sensation noted.  She states this is mainly involving her left foot.  She does also admit some abdominal bloating and cramping which she states occurs when she has hypokalemia and believes that her potassium is low and also causing her lower extremity paresthesias and pain.  She denies any chest pain, shortness of breath, fever, chills, current pregnancy.    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  Social History     Tobacco Use    Smoking status: Every Day     Types: Cigarettes    Smokeless tobacco: Never   Substance Use Topics    Alcohol use: Yes    Drug use: Not Currently     No family history on file.  Allergies   Allergen Reactions    Latex Rash    Morphine Hives and Itching     swelling and itching      Current Outpatient Medications   Medication Instructions    amLODIPine (NORVASC) 5 mg, oral, Daily    blood pressure monitor (Blood Pressure Kit) kit Check your blood pressure as indicted daily prior to and after medications.    cetirizine (ZYRTEC) 10 mg, oral, Daily    DULoxetine (CYMBALTA) 60 mg, oral, Daily RT    fluticasone propion-salmeteroL (Advair Diskus) 100-50 mcg/dose diskus inhaler 1 puff, inhalation, 2 times daily    losartan (Cozaar) 100 mg tablet 1 tablet, oral, Daily    pregabalin (LYRICA) 50 mg, oral, 3 times daily    spironolactone (ALDACTONE) 25 mg, oral, Daily       Physical Exam     Appearance: Alert, oriented , cooperative,  in acute distress. Well nourished & well hydrated.     Skin: Intact,  dry skin, no lesions, rash, petechiae or purpura.      Eyes: PERRLA, EOMs intact,  Conjunctiva pink with no redness or exudates. Cornea & anterior chamber are clear, Eyelids without lesions. No scleral icterus.      ENT: Hearing grossly intact. External auditory canals patent, tympanic membranes intact with visible landmarks. Nares patent, mucus membranes moist. Dentition without lesions. Pharynx clear, uvula midline.      Neck: Supple, without meningismus. Thyroid not palpable. Trachea at midline. No lymphadenopathy.     Pulmonary: Clear bilaterally with good chest wall excursion. No rales, rhonchi or wheezing. No accessory muscle use or stridor.     Cardiac: Normal S1, S2 without murmur, rub, gallop or extrasystole. No JVD, Carotids without bruits.     Abdomen: Soft, mild generalized abdominal tenderness, active bowel sounds.  No palpable organomegaly.  No rebound or guarding.  No CVA tenderness.     Genitourinary: Exam deferred.     Musculoskeletal: Full range of motion. no pain, edema, or deformity. Pulses full and equal. No cyanosis or clubbing.      Neurological:  Cranial nerves II through XII are grossly intact, finger-nose touch is normal, hyperesthesias noted to the bilateral distal lower  "extremities, no weakness, no focal findings identified.     Psychiatric: Appropriate mood and affect.      Results     Labs Reviewed   CBC WITH AUTO DIFFERENTIAL - Abnormal       Result Value    WBC 4.8      nRBC 0.0      RBC 3.61 (*)     Hemoglobin 12.4      Hematocrit 36.4       (*)     MCH 34.3 (*)     MCHC 34.1      RDW 13.2      Platelets 239      Neutrophils % 52.9      Immature Granulocytes %, Automated 0.4      Lymphocytes % 36.4      Monocytes % 9.7      Eosinophils % 0.4      Basophils % 0.2      Neutrophils Absolute 2.55      Immature Granulocytes Absolute, Automated 0.02      Lymphocytes Absolute 1.76      Monocytes Absolute 0.47      Eosinophils Absolute 0.02      Basophils Absolute 0.01     MAGNESIUM - Abnormal    Magnesium 1.00 (*)    COMPREHENSIVE METABOLIC PANEL - Abnormal    Glucose 126 (*)     Sodium 137      Potassium 3.5      Chloride 98      Bicarbonate 25      Anion Gap 18      Urea Nitrogen 5 (*)     Creatinine 0.60      eGFR >90      Calcium 8.8      Albumin 3.9      Alkaline Phosphatase 140 (*)     Total Protein 6.8       (*)     Bilirubin, Total 1.0      ALT 81 (*)      No orders to display         ED Course & Medical Decision Making     Medications   magnesium sulfate IV 2 g (2 g intravenous New Bag 1/13/24 2107)   sodium chloride 0.9 % bolus 1,000 mL (1,000 mL intravenous New Bag 1/13/24 1945)   fentaNYL PF (Sublimaze) injection 50 mcg (50 mcg intravenous Given 1/13/24 1931)   ondansetron (Zofran) injection 4 mg (4 mg intravenous Given 1/13/24 1931)   droperidol (Inapsine) injection 2.5 mg (2.5 mg intravenous Given 1/13/24 2103)     Diagnoses as of 01/13/24 2130   Hypomagnesemia   Paresthesias     Heart Rate:  [143]   Temp:  [37.1 °C (98.7 °F)]   Resp:  [18]   BP: (174)/(91)   Height:  [170.2 cm (5' 7\")]   Weight:  [77.1 kg (170 lb)]   SpO2:  [97 %]      Sharla Ramirez is a 43 y.o. female with past medical history of hypothyroidism, hypertension, chronic hypokalemia and " hypomagnesemia who presents with complaint of possible hypokalemia.  Patient does have history of multiple visits due to recurrent hypokalemia and low magnesemia.  No new traumatic injuries noted on initial examination.  I do have concern for possible worsening electrolyte abnormalities as well as subjective paresthesias along the bilateral distal lower extremities with no focal neurological deficits noted or need for additional emergent imaging at this time.  Patient is neurovascular intact.  Patient was recorded as tachycardic initially with a heart rate in the 140s and hypertensive with a /91.  Otherwise afebrile.  No signs of sepsis on clinical examination.  When I saw the patient she was tachycardic although in the 110s.  Blood pressure was improved on reexamination.  EKG did show sinus tachycardia with a rate of 115 bpm with T wave inversions and some flattening noted in the inferior leads with no ST elevation noted or significant arrhythmia.  Intervals normal.  Lab work did show nonspecific transaminitis as well as hypomagnesemia of 1.0 and otherwise normal potassium and renal function.  No significant leukocytosis or anemia.  Glucose normal.  Patient was given 2 g of IV magnesium for replenishment and did also receive Zofran and fentanyl for pain control.  She did have significant relief from reexamination and did feel stable for discharge home this time.  Also given 1 L normal saline.  Heart rate still in the low 100s although stable for discharge at this time and will follow-up with primary care provider for additional evaluation.    Procedures   Procedures    Diagnosis     1. Hypomagnesemia    2. Paresthesias        Disposition     DISCHARGE.  The patient is discharged back to their place of residence.  Discharge diagnosis, instructions and plan were discussed and understood. At the time of discharge the patient was comfortable and was in no apparent distress. Patient is aware of diagnostic  uncertainty and was notified though testing is negative here, there is a very small chance that pathology may be missed.  The patient understands these risks and the patient /family understood to return immediately to the emergency department if the symptoms worsen or if they have any additional concerns.    FOLLOW UP  Primary care provider in 1-2 days.        ED Prescriptions    None            Deng Trotter DO  01/13/24 3410

## 2024-01-14 NOTE — DISCHARGE INSTRUCTIONS
You have been seen at a [facility].  Please follow-up with your primary care provider in the next 1 to 2 days for further evaluation and routine follow-up.  Please return to the emergency room if having any worsening symptoms.  Please follow-up with any specialists if discussed during your emergency room stay.

## 2024-01-15 LAB
ATRIAL RATE: 115 BPM
P AXIS: 44 DEGREES
P OFFSET: 206 MS
P ONSET: 159 MS
PR INTERVAL: 124 MS
Q ONSET: 221 MS
QRS COUNT: 19 BEATS
QRS DURATION: 82 MS
QT INTERVAL: 344 MS
QTC CALCULATION(BAZETT): 475 MS
QTC FREDERICIA: 427 MS
R AXIS: -14 DEGREES
T AXIS: 17 DEGREES
T OFFSET: 393 MS
VENTRICULAR RATE: 115 BPM

## 2024-02-19 ENCOUNTER — HOSPITAL ENCOUNTER (EMERGENCY)
Facility: HOSPITAL | Age: 44
Discharge: HOME | End: 2024-02-19
Attending: EMERGENCY MEDICINE
Payer: COMMERCIAL

## 2024-02-19 ENCOUNTER — APPOINTMENT (OUTPATIENT)
Dept: RADIOLOGY | Facility: HOSPITAL | Age: 44
End: 2024-02-19
Payer: COMMERCIAL

## 2024-02-19 ENCOUNTER — APPOINTMENT (OUTPATIENT)
Dept: CARDIOLOGY | Facility: HOSPITAL | Age: 44
End: 2024-02-19
Payer: COMMERCIAL

## 2024-02-19 VITALS
HEIGHT: 66 IN | OXYGEN SATURATION: 96 % | TEMPERATURE: 98 F | SYSTOLIC BLOOD PRESSURE: 120 MMHG | WEIGHT: 165 LBS | RESPIRATION RATE: 18 BRPM | HEART RATE: 85 BPM | DIASTOLIC BLOOD PRESSURE: 87 MMHG | BODY MASS INDEX: 26.52 KG/M2

## 2024-02-19 DIAGNOSIS — E87.8 ELECTROLYTE DISTURBANCE: Primary | ICD-10-CM

## 2024-02-19 LAB
ALBUMIN SERPL BCP-MCNC: 4.1 G/DL (ref 3.4–5)
ALP SERPL-CCNC: 125 U/L (ref 33–110)
ALT SERPL W P-5'-P-CCNC: 49 U/L (ref 7–45)
ANION GAP SERPL CALC-SCNC: 28 MMOL/L (ref 10–20)
AST SERPL W P-5'-P-CCNC: 94 U/L (ref 9–39)
B-HCG SERPL-ACNC: <2 MIU/ML
BASOPHILS # BLD AUTO: 0.02 X10*3/UL (ref 0–0.1)
BASOPHILS NFR BLD AUTO: 0.3 %
BILIRUB SERPL-MCNC: 0.8 MG/DL (ref 0–1.2)
BUN SERPL-MCNC: 9 MG/DL (ref 6–23)
CALCIUM SERPL-MCNC: 8.7 MG/DL (ref 8.6–10.3)
CARDIAC TROPONIN I PNL SERPL HS: 6 NG/L (ref 0–13)
CHLORIDE SERPL-SCNC: 94 MMOL/L (ref 98–107)
CO2 SERPL-SCNC: 19 MMOL/L (ref 21–32)
CREAT SERPL-MCNC: 0.91 MG/DL (ref 0.5–1.05)
EGFRCR SERPLBLD CKD-EPI 2021: 81 ML/MIN/1.73M*2
EOSINOPHIL # BLD AUTO: 0.01 X10*3/UL (ref 0–0.7)
EOSINOPHIL NFR BLD AUTO: 0.1 %
ERYTHROCYTE [DISTWIDTH] IN BLOOD BY AUTOMATED COUNT: 13.1 % (ref 11.5–14.5)
GLUCOSE SERPL-MCNC: 111 MG/DL (ref 74–99)
HCT VFR BLD AUTO: 34.6 % (ref 36–46)
HGB BLD-MCNC: 12.4 G/DL (ref 12–16)
IMM GRANULOCYTES # BLD AUTO: 0.02 X10*3/UL (ref 0–0.7)
IMM GRANULOCYTES NFR BLD AUTO: 0.3 % (ref 0–0.9)
LYMPHOCYTES # BLD AUTO: 2.24 X10*3/UL (ref 1.2–4.8)
LYMPHOCYTES NFR BLD AUTO: 28.7 %
MAGNESIUM SERPL-MCNC: 0.7 MG/DL (ref 1.6–2.4)
MCH RBC QN AUTO: 35 PG (ref 26–34)
MCHC RBC AUTO-ENTMCNC: 35.8 G/DL (ref 32–36)
MCV RBC AUTO: 98 FL (ref 80–100)
MONOCYTES # BLD AUTO: 0.68 X10*3/UL (ref 0.1–1)
MONOCYTES NFR BLD AUTO: 8.7 %
NEUTROPHILS # BLD AUTO: 4.83 X10*3/UL (ref 1.2–7.7)
NEUTROPHILS NFR BLD AUTO: 61.9 %
NRBC BLD-RTO: 0 /100 WBCS (ref 0–0)
PHOSPHATE SERPL-MCNC: 2.8 MG/DL (ref 2.5–4.9)
PLATELET # BLD AUTO: 341 X10*3/UL (ref 150–450)
POTASSIUM SERPL-SCNC: 3.4 MMOL/L (ref 3.5–5.3)
PROT SERPL-MCNC: 7.6 G/DL (ref 6.4–8.2)
RBC # BLD AUTO: 3.54 X10*6/UL (ref 4–5.2)
SODIUM SERPL-SCNC: 138 MMOL/L (ref 136–145)
WBC # BLD AUTO: 7.8 X10*3/UL (ref 4.4–11.3)

## 2024-02-19 PROCEDURE — 96361 HYDRATE IV INFUSION ADD-ON: CPT

## 2024-02-19 PROCEDURE — 93005 ELECTROCARDIOGRAM TRACING: CPT

## 2024-02-19 PROCEDURE — 84702 CHORIONIC GONADOTROPIN TEST: CPT | Performed by: PHYSICIAN ASSISTANT

## 2024-02-19 PROCEDURE — 96366 THER/PROPH/DIAG IV INF ADDON: CPT

## 2024-02-19 PROCEDURE — 99284 EMERGENCY DEPT VISIT MOD MDM: CPT | Mod: 25

## 2024-02-19 PROCEDURE — 2500000004 HC RX 250 GENERAL PHARMACY W/ HCPCS (ALT 636 FOR OP/ED): Performed by: EMERGENCY MEDICINE

## 2024-02-19 PROCEDURE — 2500000002 HC RX 250 W HCPCS SELF ADMINISTERED DRUGS (ALT 637 FOR MEDICARE OP, ALT 636 FOR OP/ED): Performed by: PHYSICIAN ASSISTANT

## 2024-02-19 PROCEDURE — 96365 THER/PROPH/DIAG IV INF INIT: CPT

## 2024-02-19 PROCEDURE — 2500000004 HC RX 250 GENERAL PHARMACY W/ HCPCS (ALT 636 FOR OP/ED)

## 2024-02-19 PROCEDURE — 96375 TX/PRO/DX INJ NEW DRUG ADDON: CPT

## 2024-02-19 PROCEDURE — 83735 ASSAY OF MAGNESIUM: CPT | Performed by: PHYSICIAN ASSISTANT

## 2024-02-19 PROCEDURE — 71046 X-RAY EXAM CHEST 2 VIEWS: CPT

## 2024-02-19 PROCEDURE — 71046 X-RAY EXAM CHEST 2 VIEWS: CPT | Performed by: RADIOLOGY

## 2024-02-19 PROCEDURE — 80053 COMPREHEN METABOLIC PANEL: CPT | Performed by: PHYSICIAN ASSISTANT

## 2024-02-19 PROCEDURE — 36415 COLL VENOUS BLD VENIPUNCTURE: CPT | Performed by: PHYSICIAN ASSISTANT

## 2024-02-19 PROCEDURE — 84100 ASSAY OF PHOSPHORUS: CPT | Performed by: PHYSICIAN ASSISTANT

## 2024-02-19 PROCEDURE — 96376 TX/PRO/DX INJ SAME DRUG ADON: CPT

## 2024-02-19 PROCEDURE — 84484 ASSAY OF TROPONIN QUANT: CPT | Performed by: PHYSICIAN ASSISTANT

## 2024-02-19 PROCEDURE — 85025 COMPLETE CBC W/AUTO DIFF WBC: CPT | Performed by: PHYSICIAN ASSISTANT

## 2024-02-19 PROCEDURE — 2500000001 HC RX 250 WO HCPCS SELF ADMINISTERED DRUGS (ALT 637 FOR MEDICARE OP): Performed by: EMERGENCY MEDICINE

## 2024-02-19 PROCEDURE — 2500000004 HC RX 250 GENERAL PHARMACY W/ HCPCS (ALT 636 FOR OP/ED): Performed by: PHYSICIAN ASSISTANT

## 2024-02-19 RX ORDER — CYCLOBENZAPRINE HCL 5 MG
5 TABLET ORAL 3 TIMES DAILY
Qty: 30 TABLET | Refills: 0 | Status: SHIPPED | OUTPATIENT
Start: 2024-02-19 | End: 2024-02-29

## 2024-02-19 RX ORDER — ONDANSETRON HYDROCHLORIDE 2 MG/ML
INJECTION, SOLUTION INTRAVENOUS
Status: COMPLETED
Start: 2024-02-19 | End: 2024-02-19

## 2024-02-19 RX ORDER — CYCLOBENZAPRINE HCL 5 MG
5 TABLET ORAL ONCE
Status: COMPLETED | OUTPATIENT
Start: 2024-02-19 | End: 2024-02-19

## 2024-02-19 RX ORDER — ONDANSETRON 4 MG/1
4 TABLET, ORALLY DISINTEGRATING ORAL EVERY 8 HOURS PRN
Qty: 15 TABLET | Refills: 0 | Status: SHIPPED | OUTPATIENT
Start: 2024-02-19 | End: 2024-05-08

## 2024-02-19 RX ORDER — ONDANSETRON HYDROCHLORIDE 2 MG/ML
4 INJECTION, SOLUTION INTRAVENOUS ONCE
Status: COMPLETED | OUTPATIENT
Start: 2024-02-19 | End: 2024-02-19

## 2024-02-19 RX ORDER — KETOROLAC TROMETHAMINE 30 MG/ML
15 INJECTION, SOLUTION INTRAMUSCULAR; INTRAVENOUS ONCE
Status: COMPLETED | OUTPATIENT
Start: 2024-02-19 | End: 2024-02-19

## 2024-02-19 RX ORDER — MAGNESIUM SULFATE HEPTAHYDRATE 40 MG/ML
4 INJECTION, SOLUTION INTRAVENOUS ONCE
Status: COMPLETED | OUTPATIENT
Start: 2024-02-19 | End: 2024-02-19

## 2024-02-19 RX ORDER — POTASSIUM CHLORIDE 20 MEQ/1
40 TABLET, EXTENDED RELEASE ORAL DAILY
Status: DISCONTINUED | OUTPATIENT
Start: 2024-02-19 | End: 2024-02-19 | Stop reason: HOSPADM

## 2024-02-19 RX ORDER — KETOROLAC TROMETHAMINE 30 MG/ML
INJECTION, SOLUTION INTRAMUSCULAR; INTRAVENOUS
Status: COMPLETED
Start: 2024-02-19 | End: 2024-02-19

## 2024-02-19 RX ADMIN — POTASSIUM CHLORIDE 40 MEQ: 1500 TABLET, EXTENDED RELEASE ORAL at 12:16

## 2024-02-19 RX ADMIN — MAGNESIUM SULFATE HEPTAHYDRATE 4 G: 40 INJECTION, SOLUTION INTRAVENOUS at 12:16

## 2024-02-19 RX ADMIN — SODIUM CHLORIDE 1000 ML: 9 INJECTION, SOLUTION INTRAVENOUS at 10:13

## 2024-02-19 RX ADMIN — ONDANSETRON 4 MG: 2 INJECTION INTRAMUSCULAR; INTRAVENOUS at 10:13

## 2024-02-19 RX ADMIN — KETOROLAC TROMETHAMINE 15 MG: 30 INJECTION INTRAMUSCULAR; INTRAVENOUS at 10:12

## 2024-02-19 RX ADMIN — ONDANSETRON HYDROCHLORIDE 4 MG: 2 INJECTION, SOLUTION INTRAVENOUS at 10:13

## 2024-02-19 RX ADMIN — CYCLOBENZAPRINE HYDROCHLORIDE 5 MG: 5 TABLET, FILM COATED ORAL at 14:06

## 2024-02-19 RX ADMIN — KETOROLAC TROMETHAMINE 15 MG: 30 INJECTION, SOLUTION INTRAMUSCULAR; INTRAVENOUS at 10:12

## 2024-02-19 RX ADMIN — KETOROLAC TROMETHAMINE 15 MG: 30 INJECTION INTRAMUSCULAR; INTRAVENOUS at 12:16

## 2024-02-19 RX ADMIN — SODIUM CHLORIDE 1000 ML: 9 INJECTION, SOLUTION INTRAVENOUS at 14:06

## 2024-02-19 ASSESSMENT — PAIN - FUNCTIONAL ASSESSMENT
PAIN_FUNCTIONAL_ASSESSMENT: 0-10

## 2024-02-19 ASSESSMENT — COLUMBIA-SUICIDE SEVERITY RATING SCALE - C-SSRS
2. HAVE YOU ACTUALLY HAD ANY THOUGHTS OF KILLING YOURSELF?: NO
6. HAVE YOU EVER DONE ANYTHING, STARTED TO DO ANYTHING, OR PREPARED TO DO ANYTHING TO END YOUR LIFE?: NO
1. IN THE PAST MONTH, HAVE YOU WISHED YOU WERE DEAD OR WISHED YOU COULD GO TO SLEEP AND NOT WAKE UP?: NO

## 2024-02-19 ASSESSMENT — PAIN SCALES - GENERAL
PAINLEVEL_OUTOF10: 7
PAINLEVEL_OUTOF10: 9
PAINLEVEL_OUTOF10: 10 - WORST POSSIBLE PAIN

## 2024-02-19 NOTE — DISCHARGE INSTRUCTIONS
Please stay well-hydrated.  Please continue to take your electrolyte supplements as previous prescribed.  Please return to ED for worsening chest pain, palpitation or shortness of breath, inability to drink or any other concerning symptoms.

## 2024-02-19 NOTE — ED PROVIDER NOTES
HPI     CC: Spasms     HPI: Sharla Ramirez is a 43 y.o. 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  Patient presents with fiancé.  She states that she is presenting with similar symptoms as before which include numbness to lower extremities, diffuse achiness/pain, muscle cramps, and decreased appetite.  She states that the flare started today.  She does report that she has been taking her supplements as prescribed.  She also reports some chest pain that she attributes to her asthma and she has increased her treatments which have helped.  She feels that her leg numbness which is slightly worse than baseline, may be due to starting new medication of amlodipine.  She states that the leg numbness has been ongoing over the past week.  She reports that her vision today feels slightly strange which sometimes happens with her electrolyte abnormalities.    ROS: 10-point review of systems was performed and is otherwise negative except as noted in HPI.      Past Medical History: Noncontributory except per HPI     Past Surgical History: Noncontributory except per HPI     Family History: Reviewed and noncontributory     Social History:  Noncontributory except per HPI       Allergies   Allergen Reactions    Latex Rash    Morphine Hives and Itching     swelling and itching       Home Meds:   Current Outpatient Medications   Medication Instructions    amLODIPine (NORVASC) 5 mg, oral, Daily    blood pressure monitor (Blood Pressure Kit) kit Check your blood pressure as indicted daily prior to and after medications.    cetirizine (ZYRTEC) 10 mg, oral, Daily    DULoxetine (CYMBALTA) 60 mg, oral, Daily RT    fluticasone propion-salmeteroL (Advair Diskus) 100-50 mcg/dose diskus inhaler 1 puff, inhalation, 2 times daily     "losartan (Cozaar) 100 mg tablet 1 tablet, oral, Daily    pregabalin (LYRICA) 50 mg, oral, 3 times daily    spironolactone (ALDACTONE) 25 mg, oral, Daily        ED Triage Vitals [02/19/24 0850]   Temperature Heart Rate Respirations BP   36.7 °C (98 °F) (!) 120 16 (!) 150/100      Pulse Ox Temp src Heart Rate Source Patient Position   97 % -- -- --      BP Location FiO2 (%)     -- --         Heart Rate:  []   Temperature:  [36.7 °C (98 °F)]   Respirations:  [15-31]   BP: (116-150)/()   Height:  [167.6 cm (5' 6\")]   Weight:  [74.8 kg (165 lb)]   Pulse Ox:  [94 %-98 %]      Physical Exam:  Physical Exam  Vitals and nursing note reviewed.   Constitutional:       General: She is in acute distress.      Appearance: Normal appearance. She is not ill-appearing.   HENT:      Head: Normocephalic and atraumatic.      Right Ear: Tympanic membrane and external ear normal.      Left Ear: Tympanic membrane and external ear normal.      Nose: Nose normal.      Mouth/Throat:      Mouth: Mucous membranes are moist.   Eyes:      Extraocular Movements: Extraocular movements intact.      Conjunctiva/sclera: Conjunctivae normal.      Pupils: Pupils are equal, round, and reactive to light.   Cardiovascular:      Rate and Rhythm: Regular rhythm. Tachycardia present.      Pulses: Normal pulses.   Pulmonary:      Effort: Pulmonary effort is normal. No respiratory distress.      Breath sounds: Normal breath sounds. No stridor. No wheezing, rhonchi or rales.   Chest:      Chest wall: No tenderness.   Abdominal:      General: Abdomen is flat.      Palpations: Abdomen is soft.      Tenderness: There is no abdominal tenderness.   Musculoskeletal:      Cervical back: Normal range of motion and neck supple. No rigidity.      Comments: Generalized muscle soreness and pain    Patient states she cannot complete dorsi flexion or plantarflexion well due to significant amount of pain.  States left leg is always like this due to previous injury " but right leg is slightly new.  Unable to complete straight leg raise as she states it hurt too much.  No midline spinal tenderness to palpation.   Skin:     General: Skin is warm and dry.      Capillary Refill: Capillary refill takes less than 2 seconds.   Neurological:      General: No focal deficit present.      Mental Status: She is alert and oriented to person, place, and time.      Motor: Weakness present.   Psychiatric:      Comments: Very anxious          Diagnostic Results        Labs Reviewed   CBC WITH AUTO DIFFERENTIAL - Abnormal       Result Value    WBC 7.8      nRBC 0.0      RBC 3.54 (*)     Hemoglobin 12.4      Hematocrit 34.6 (*)     MCV 98      MCH 35.0 (*)     MCHC 35.8      RDW 13.1      Platelets 341      Neutrophils % 61.9      Immature Granulocytes %, Automated 0.3      Lymphocytes % 28.7      Monocytes % 8.7      Eosinophils % 0.1      Basophils % 0.3      Neutrophils Absolute 4.83      Immature Granulocytes Absolute, Automated 0.02      Lymphocytes Absolute 2.24      Monocytes Absolute 0.68      Eosinophils Absolute 0.01      Basophils Absolute 0.02     COMPREHENSIVE METABOLIC PANEL - Abnormal    Glucose 111 (*)     Sodium 138      Potassium 3.4 (*)     Chloride 94 (*)     Bicarbonate 19 (*)     Anion Gap 28 (*)     Urea Nitrogen 9      Creatinine 0.91      eGFR 81      Calcium 8.7      Albumin 4.1      Alkaline Phosphatase 125 (*)     Total Protein 7.6      AST 94 (*)     Bilirubin, Total 0.8      ALT 49 (*)    MAGNESIUM - Abnormal    Magnesium 0.70 (*)    PHOSPHORUS - Normal    Phosphorus 2.8     TROPONIN I, HIGH SENSITIVITY - Normal    Troponin I, High Sensitivity 6      Narrative:     Less than 99th percentile of normal range cutoff-  Female and children under 18 years old <14 ng/L; Male <21 ng/L: Negative  Repeat testing should be performed if clinically indicated.     Female and children under 18 years old 14-50 ng/L; Male 21-50 ng/L:  Consistent with possible cardiac damage and  possible increased clinical   risk. Serial measurements may help to assess extent of myocardial damage.     >50 ng/L: Consistent with cardiac damage, increased clinical risk and  myocardial infarction. Serial measurements may help assess extent of   myocardial damage.      NOTE: Children less than 1 year old may have higher baseline troponin   levels and results should be interpreted in conjunction with the overall   clinical context.     NOTE: Troponin I testing is performed using a different   testing methodology at [facility] than at other   [facility]. Direct result comparisons should only   be made within the same method.   HUMAN CHORIONIC GONADOTROPIN, SERUM QUANTITATIVE - Normal    HCG, Beta-Quantitative <2      Narrative:      Total HCG measurement is performed using the [employer] Access   Immunoassay which detects intact HCG and free beta HCG subunit.    This test is not indicated for use as a tumor marker.   HCG testing is performed using a different test methodology at [facility] than other [facility]. Direct result comparison   should only be made within the same method.             XR chest 2 views    (Results Pending)   CT head wo IV contrast    (Results Pending)                 [place] Coma Scale Score: 15                  Procedure  Procedures    ED Course & MDM   Assessment/Plan:     Medications   potassium chloride CR (Klor-Con M20) ER tablet 40 mEq (has no administration in time range)   magnesium sulfate IV 4 g (has no administration in time range)   ketorolac (Toradol) injection 15 mg (has no administration in time range)   sodium chloride 0.9 % bolus 1,000 mL (0 mL intravenous Stopped 2/19/24 1113)   ondansetron (Zofran) injection 4 mg (4 mg intravenous Given 2/19/24 1013)   ketorolac (Toradol) injection 15 mg (15 mg intravenous Given 2/19/24 1012)             Medical Decision Making    Sharla Ramirez is a 43 y.o. 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  Patient is nontoxic-appearing but initial vital signs are concerning for tachycardia at a rate of 120 with a blood pressure of 150/100.  Patient appears to be hyperventilating and very anxious on exam.  Differential diagnosis includes electrolyte abnormalities, pregnancy, dehydration, ACS, or intracranial pathology.  Will obtain 2 view chest x-ray, CT head without contrast, CBC with differential, troponin, phosphorus, magnesium, CMP, and hCG.  Patient did request pain medication and nausea medication so she was given Zofran and 15 mg IV Toradol x 2.  Also given 1 L normal saline bolus.  hCG was negative.  Phosphorus normal.  Troponin normal.  CBC grossly unremarkable.  Chest x-ray unremarkable. Magnesium level 0.7 from prior one 1 month ago.  CMP with hypokalemia 3.4 from prior 3.5, decreased bicarb at 19 prior 25, and elevated liver enzymes similar to prior.  Patient was given 4 mg IV magnesium sulfate over 4 hours, 40 mill equivalents of oral potassium for replenishment.  Pending CT head at time of signout to Dr. Mcguire.     EKG obtained to rule out ACS and my interpretation shows sinus tachycardia at a rate of 116, QTc 478, no acute ST changes, no significant change from prior.    Disposition: TBD    ED Prescriptions    None         Social Determinants Affecting Care: none     Kasey Ricks PA-C    This note was dictated by speech recognition. Minor errors in transcription may be present.     Kasey Ricks PA-C  02/19/24 2175

## 2024-02-19 NOTE — ED TRIAGE NOTES
PT is A/Ox4 coming from home, Pt's main complaint is body aches  and muscle spasms, PT stated that this happens when her potassium is low. PT is also complaining of nausea and vomiting.

## 2024-02-21 ENCOUNTER — HOSPITAL ENCOUNTER (OUTPATIENT)
Facility: HOSPITAL | Age: 44
Setting detail: OBSERVATION
Discharge: HOME | End: 2024-02-23
Attending: EMERGENCY MEDICINE | Admitting: INTERNAL MEDICINE
Payer: COMMERCIAL

## 2024-02-21 DIAGNOSIS — E87.6 HYPOKALEMIA: ICD-10-CM

## 2024-02-21 DIAGNOSIS — S84.92XS: Chronic | ICD-10-CM

## 2024-02-21 DIAGNOSIS — E83.42 HYPOMAGNESEMIA: ICD-10-CM

## 2024-02-21 DIAGNOSIS — E87.8 ELECTROLYTE ABNORMALITY: Primary | ICD-10-CM

## 2024-02-21 DIAGNOSIS — I10 ESSENTIAL HYPERTENSION: Chronic | ICD-10-CM

## 2024-02-21 DIAGNOSIS — J45.30 MILD PERSISTENT ASTHMA WITHOUT COMPLICATION (HHS-HCC): Chronic | ICD-10-CM

## 2024-02-21 DIAGNOSIS — E83.39 HYPOPHOSPHATEMIA: ICD-10-CM

## 2024-02-21 LAB
ALBUMIN SERPL BCP-MCNC: 3.8 G/DL (ref 3.4–5)
ALP SERPL-CCNC: 121 U/L (ref 33–110)
ALT SERPL W P-5'-P-CCNC: 50 U/L (ref 7–45)
ANION GAP SERPL CALC-SCNC: 21 MMOL/L (ref 10–20)
AST SERPL W P-5'-P-CCNC: 89 U/L (ref 9–39)
ATRIAL RATE: 116 BPM
BASOPHILS # BLD AUTO: 0.03 X10*3/UL (ref 0–0.1)
BASOPHILS NFR BLD AUTO: 0.4 %
BILIRUB SERPL-MCNC: 0.6 MG/DL (ref 0–1.2)
BUN SERPL-MCNC: 5 MG/DL (ref 6–23)
CALCIUM SERPL-MCNC: 7.8 MG/DL (ref 8.6–10.3)
CHLORIDE SERPL-SCNC: 98 MMOL/L (ref 98–107)
CO2 SERPL-SCNC: 22 MMOL/L (ref 21–32)
CREAT SERPL-MCNC: 0.69 MG/DL (ref 0.5–1.05)
EGFRCR SERPLBLD CKD-EPI 2021: >90 ML/MIN/1.73M*2
EOSINOPHIL # BLD AUTO: 0.01 X10*3/UL (ref 0–0.7)
EOSINOPHIL NFR BLD AUTO: 0.1 %
ERYTHROCYTE [DISTWIDTH] IN BLOOD BY AUTOMATED COUNT: 12.5 % (ref 11.5–14.5)
GLUCOSE SERPL-MCNC: 111 MG/DL (ref 74–99)
HCT VFR BLD AUTO: 34.9 % (ref 36–46)
HGB BLD-MCNC: 12.2 G/DL (ref 12–16)
IMM GRANULOCYTES # BLD AUTO: 0.03 X10*3/UL (ref 0–0.7)
IMM GRANULOCYTES NFR BLD AUTO: 0.4 % (ref 0–0.9)
LYMPHOCYTES # BLD AUTO: 1.21 X10*3/UL (ref 1.2–4.8)
LYMPHOCYTES NFR BLD AUTO: 17.1 %
MAGNESIUM SERPL-MCNC: 0.8 MG/DL (ref 1.6–2.4)
MCH RBC QN AUTO: 34.1 PG (ref 26–34)
MCHC RBC AUTO-ENTMCNC: 35 G/DL (ref 32–36)
MCV RBC AUTO: 98 FL (ref 80–100)
MONOCYTES # BLD AUTO: 0.66 X10*3/UL (ref 0.1–1)
MONOCYTES NFR BLD AUTO: 9.3 %
NEUTROPHILS # BLD AUTO: 5.13 X10*3/UL (ref 1.2–7.7)
NEUTROPHILS NFR BLD AUTO: 72.7 %
NRBC BLD-RTO: 0 /100 WBCS (ref 0–0)
P AXIS: 51 DEGREES
P OFFSET: 211 MS
P ONSET: 166 MS
PHOSPHATE SERPL-MCNC: 1 MG/DL (ref 2.5–4.9)
PLATELET # BLD AUTO: 324 X10*3/UL (ref 150–450)
POTASSIUM SERPL-SCNC: 3 MMOL/L (ref 3.5–5.3)
PR INTERVAL: 112 MS
PROT SERPL-MCNC: 7.3 G/DL (ref 6.4–8.2)
Q ONSET: 222 MS
QRS COUNT: 19 BEATS
QRS DURATION: 80 MS
QT INTERVAL: 344 MS
QTC CALCULATION(BAZETT): 478 MS
QTC FREDERICIA: 428 MS
R AXIS: 34 DEGREES
RBC # BLD AUTO: 3.58 X10*6/UL (ref 4–5.2)
SODIUM SERPL-SCNC: 138 MMOL/L (ref 136–145)
T AXIS: 65 DEGREES
T OFFSET: 394 MS
VENTRICULAR RATE: 116 BPM
WBC # BLD AUTO: 7.1 X10*3/UL (ref 4.4–11.3)

## 2024-02-21 PROCEDURE — 96366 THER/PROPH/DIAG IV INF ADDON: CPT

## 2024-02-21 PROCEDURE — 2500000004 HC RX 250 GENERAL PHARMACY W/ HCPCS (ALT 636 FOR OP/ED): Performed by: EMERGENCY MEDICINE

## 2024-02-21 PROCEDURE — 36415 COLL VENOUS BLD VENIPUNCTURE: CPT | Performed by: EMERGENCY MEDICINE

## 2024-02-21 PROCEDURE — 2500000001 HC RX 250 WO HCPCS SELF ADMINISTERED DRUGS (ALT 637 FOR MEDICARE OP): Performed by: EMERGENCY MEDICINE

## 2024-02-21 PROCEDURE — 82077 ASSAY SPEC XCP UR&BREATH IA: CPT | Performed by: PHYSICIAN ASSISTANT

## 2024-02-21 PROCEDURE — 85025 COMPLETE CBC W/AUTO DIFF WBC: CPT | Performed by: EMERGENCY MEDICINE

## 2024-02-21 PROCEDURE — 96365 THER/PROPH/DIAG IV INF INIT: CPT

## 2024-02-21 PROCEDURE — 84100 ASSAY OF PHOSPHORUS: CPT | Performed by: EMERGENCY MEDICINE

## 2024-02-21 PROCEDURE — 80053 COMPREHEN METABOLIC PANEL: CPT | Performed by: EMERGENCY MEDICINE

## 2024-02-21 PROCEDURE — 83735 ASSAY OF MAGNESIUM: CPT | Performed by: EMERGENCY MEDICINE

## 2024-02-21 PROCEDURE — 99285 EMERGENCY DEPT VISIT HI MDM: CPT | Mod: 25

## 2024-02-21 RX ORDER — MAGNESIUM SULFATE HEPTAHYDRATE 40 MG/ML
2 INJECTION, SOLUTION INTRAVENOUS ONCE
Status: COMPLETED | OUTPATIENT
Start: 2024-02-21 | End: 2024-02-22

## 2024-02-21 RX ORDER — POTASSIUM CHLORIDE 1.5 G/1.58G
20 POWDER, FOR SOLUTION ORAL 2 TIMES DAILY
Status: DISCONTINUED | OUTPATIENT
Start: 2024-02-21 | End: 2024-02-22

## 2024-02-21 RX ORDER — KETOROLAC TROMETHAMINE 30 MG/ML
15 INJECTION, SOLUTION INTRAMUSCULAR; INTRAVENOUS ONCE
Status: COMPLETED | OUTPATIENT
Start: 2024-02-22 | End: 2024-02-22

## 2024-02-21 RX ADMIN — MAGNESIUM SULFATE HEPTAHYDRATE 2 G: 40 INJECTION, SOLUTION INTRAVENOUS at 23:58

## 2024-02-21 RX ADMIN — POTASSIUM CHLORIDE 20 MEQ: 1.5 POWDER, FOR SOLUTION ORAL at 23:56

## 2024-02-21 RX ADMIN — SODIUM CHLORIDE 500 ML: 9 INJECTION, SOLUTION INTRAVENOUS at 22:56

## 2024-02-21 ASSESSMENT — COLUMBIA-SUICIDE SEVERITY RATING SCALE - C-SSRS
2. HAVE YOU ACTUALLY HAD ANY THOUGHTS OF KILLING YOURSELF?: NO
1. IN THE PAST MONTH, HAVE YOU WISHED YOU WERE DEAD OR WISHED YOU COULD GO TO SLEEP AND NOT WAKE UP?: NO
6. HAVE YOU EVER DONE ANYTHING, STARTED TO DO ANYTHING, OR PREPARED TO DO ANYTHING TO END YOUR LIFE?: NO

## 2024-02-21 ASSESSMENT — PAIN SCALES - GENERAL: PAINLEVEL_OUTOF10: 10 - WORST POSSIBLE PAIN

## 2024-02-21 ASSESSMENT — PAIN DESCRIPTION - PROGRESSION: CLINICAL_PROGRESSION: NOT CHANGED

## 2024-02-21 ASSESSMENT — PAIN DESCRIPTION - FREQUENCY: FREQUENCY: CONSTANT/CONTINUOUS

## 2024-02-21 ASSESSMENT — PAIN - FUNCTIONAL ASSESSMENT: PAIN_FUNCTIONAL_ASSESSMENT: 0-10

## 2024-02-21 ASSESSMENT — PAIN DESCRIPTION - DESCRIPTORS: DESCRIPTORS: SQUEEZING;SPASM;SORE

## 2024-02-21 ASSESSMENT — PAIN DESCRIPTION - LOCATION: LOCATION: ABDOMEN

## 2024-02-21 ASSESSMENT — PAIN DESCRIPTION - PAIN TYPE: TYPE: ACUTE PAIN

## 2024-02-22 ENCOUNTER — APPOINTMENT (OUTPATIENT)
Dept: CARDIOLOGY | Facility: HOSPITAL | Age: 44
End: 2024-02-22
Payer: COMMERCIAL

## 2024-02-22 PROBLEM — E83.39 HYPOPHOSPHATEMIA: Status: ACTIVE | Noted: 2024-02-22

## 2024-02-22 PROBLEM — E87.8 ELECTROLYTE ABNORMALITY: Status: ACTIVE | Noted: 2024-02-22

## 2024-02-22 LAB
ALBUMIN SERPL BCP-MCNC: 3.5 G/DL (ref 3.4–5)
ALP SERPL-CCNC: 105 U/L (ref 33–110)
ALT SERPL W P-5'-P-CCNC: 35 U/L (ref 7–45)
AMPHETAMINES UR QL SCN: NORMAL
ANION GAP BLDV CALCULATED.4IONS-SCNC: 9 MMOL/L (ref 10–25)
ANION GAP SERPL CALC-SCNC: 13 MMOL/L (ref 10–20)
AST SERPL W P-5'-P-CCNC: 36 U/L (ref 9–39)
BARBITURATES UR QL SCN: NORMAL
BASE EXCESS BLDV CALC-SCNC: 4 MMOL/L (ref -2–3)
BENZODIAZ UR QL SCN: NORMAL
BILIRUB DIRECT SERPL-MCNC: 0.1 MG/DL (ref 0–0.3)
BILIRUB SERPL-MCNC: 0.5 MG/DL (ref 0–1.2)
BODY TEMPERATURE: 37 DEGREES CELSIUS
BUN SERPL-MCNC: 6 MG/DL (ref 6–23)
BZE UR QL SCN: NORMAL
CA-I BLDV-SCNC: 1.03 MMOL/L (ref 1.1–1.33)
CALCIUM SERPL-MCNC: 8.2 MG/DL (ref 8.6–10.3)
CANNABINOIDS UR QL SCN: NORMAL
CHLORIDE BLDV-SCNC: 104 MMOL/L (ref 98–107)
CHLORIDE SERPL-SCNC: 103 MMOL/L (ref 98–107)
CO2 SERPL-SCNC: 25 MMOL/L (ref 21–32)
CREAT SERPL-MCNC: 0.55 MG/DL (ref 0.5–1.05)
EGFRCR SERPLBLD CKD-EPI 2021: >90 ML/MIN/1.73M*2
ERYTHROCYTE [DISTWIDTH] IN BLOOD BY AUTOMATED COUNT: 12.9 % (ref 11.5–14.5)
ERYTHROCYTE [DISTWIDTH] IN BLOOD BY AUTOMATED COUNT: 13.1 % (ref 11.5–14.5)
ETHANOL SERPL-MCNC: <10 MG/DL
FENTANYL+NORFENTANYL UR QL SCN: NORMAL
GGT SERPL-CCNC: 451 U/L (ref 5–55)
GLUCOSE BLDV-MCNC: 103 MG/DL (ref 74–99)
GLUCOSE SERPL-MCNC: 114 MG/DL (ref 74–99)
HCO3 BLDV-SCNC: 27.4 MMOL/L (ref 22–26)
HCT VFR BLD AUTO: 32.6 % (ref 36–46)
HCT VFR BLD AUTO: 33.2 % (ref 36–46)
HCT VFR BLD EST: 33 % (ref 36–46)
HGB BLD-MCNC: 11.1 G/DL (ref 12–16)
HGB BLD-MCNC: 11.2 G/DL (ref 12–16)
HGB BLDV-MCNC: 11.1 G/DL (ref 12–16)
INHALED O2 CONCENTRATION: 21 %
LACTATE BLDV-SCNC: 1.1 MMOL/L (ref 0.4–2)
LACTATE SERPL-SCNC: 0.8 MMOL/L (ref 0.4–2)
MAGNESIUM SERPL-MCNC: 1.3 MG/DL (ref 1.6–2.4)
MCH RBC QN AUTO: 34.1 PG (ref 26–34)
MCH RBC QN AUTO: 34.2 PG (ref 26–34)
MCHC RBC AUTO-ENTMCNC: 33.7 G/DL (ref 32–36)
MCHC RBC AUTO-ENTMCNC: 34 G/DL (ref 32–36)
MCV RBC AUTO: 100 FL (ref 80–100)
MCV RBC AUTO: 101 FL (ref 80–100)
NRBC BLD-RTO: 0 /100 WBCS (ref 0–0)
NRBC BLD-RTO: 0 /100 WBCS (ref 0–0)
OPIATES UR QL SCN: NORMAL
OXYCODONE+OXYMORPHONE UR QL SCN: NORMAL
OXYHGB MFR BLDV: 96.4 % (ref 45–75)
PCO2 BLDV: 36 MM HG (ref 41–51)
PCP UR QL SCN: NORMAL
PH BLDV: 7.49 PH (ref 7.33–7.43)
PHOSPHATE SERPL-MCNC: 3.2 MG/DL (ref 2.5–4.9)
PLATELET # BLD AUTO: 300 X10*3/UL (ref 150–450)
PLATELET # BLD AUTO: 302 X10*3/UL (ref 150–450)
PO2 BLDV: 93 MM HG (ref 35–45)
POTASSIUM BLDV-SCNC: 3 MMOL/L (ref 3.5–5.3)
POTASSIUM SERPL-SCNC: 3.8 MMOL/L (ref 3.5–5.3)
PROT SERPL-MCNC: 6.5 G/DL (ref 6.4–8.2)
RBC # BLD AUTO: 3.25 X10*6/UL (ref 4–5.2)
RBC # BLD AUTO: 3.28 X10*6/UL (ref 4–5.2)
SAO2 % BLDV: 100 % (ref 45–75)
SODIUM BLDV-SCNC: 137 MMOL/L (ref 136–145)
SODIUM SERPL-SCNC: 137 MMOL/L (ref 136–145)
WBC # BLD AUTO: 4 X10*3/UL (ref 4.4–11.3)
WBC # BLD AUTO: 4.5 X10*3/UL (ref 4.4–11.3)

## 2024-02-22 PROCEDURE — 85027 COMPLETE CBC AUTOMATED: CPT | Performed by: NURSE PRACTITIONER

## 2024-02-22 PROCEDURE — 99222 1ST HOSP IP/OBS MODERATE 55: CPT | Performed by: PHYSICIAN ASSISTANT

## 2024-02-22 PROCEDURE — 93005 ELECTROCARDIOGRAM TRACING: CPT

## 2024-02-22 PROCEDURE — 96366 THER/PROPH/DIAG IV INF ADDON: CPT

## 2024-02-22 PROCEDURE — G0378 HOSPITAL OBSERVATION PER HR: HCPCS

## 2024-02-22 PROCEDURE — 36415 COLL VENOUS BLD VENIPUNCTURE: CPT | Performed by: NURSE PRACTITIONER

## 2024-02-22 PROCEDURE — 84132 ASSAY OF SERUM POTASSIUM: CPT | Mod: 59 | Performed by: NURSE PRACTITIONER

## 2024-02-22 PROCEDURE — 2500000004 HC RX 250 GENERAL PHARMACY W/ HCPCS (ALT 636 FOR OP/ED): Performed by: PHYSICIAN ASSISTANT

## 2024-02-22 PROCEDURE — 96375 TX/PRO/DX INJ NEW DRUG ADDON: CPT

## 2024-02-22 PROCEDURE — 82977 ASSAY OF GGT: CPT | Performed by: NURSE PRACTITIONER

## 2024-02-22 PROCEDURE — 36415 COLL VENOUS BLD VENIPUNCTURE: CPT | Performed by: EMERGENCY MEDICINE

## 2024-02-22 PROCEDURE — 96376 TX/PRO/DX INJ SAME DRUG ADON: CPT

## 2024-02-22 PROCEDURE — 80307 DRUG TEST PRSMV CHEM ANLYZR: CPT | Performed by: PHYSICIAN ASSISTANT

## 2024-02-22 PROCEDURE — 2500000001 HC RX 250 WO HCPCS SELF ADMINISTERED DRUGS (ALT 637 FOR MEDICARE OP): Performed by: PHARMACIST

## 2024-02-22 PROCEDURE — 96367 TX/PROPH/DG ADDL SEQ IV INF: CPT

## 2024-02-22 PROCEDURE — 84132 ASSAY OF SERUM POTASSIUM: CPT | Performed by: EMERGENCY MEDICINE

## 2024-02-22 PROCEDURE — 82248 BILIRUBIN DIRECT: CPT | Performed by: NURSE PRACTITIONER

## 2024-02-22 PROCEDURE — 2500000004 HC RX 250 GENERAL PHARMACY W/ HCPCS (ALT 636 FOR OP/ED): Performed by: EMERGENCY MEDICINE

## 2024-02-22 PROCEDURE — 84100 ASSAY OF PHOSPHORUS: CPT | Performed by: NURSE PRACTITIONER

## 2024-02-22 PROCEDURE — 2500000002 HC RX 250 W HCPCS SELF ADMINISTERED DRUGS (ALT 637 FOR MEDICARE OP, ALT 636 FOR OP/ED): Performed by: PHYSICIAN ASSISTANT

## 2024-02-22 PROCEDURE — 83735 ASSAY OF MAGNESIUM: CPT | Performed by: NURSE PRACTITIONER

## 2024-02-22 PROCEDURE — 83605 ASSAY OF LACTIC ACID: CPT | Mod: 59 | Performed by: EMERGENCY MEDICINE

## 2024-02-22 PROCEDURE — 2500000005 HC RX 250 GENERAL PHARMACY W/O HCPCS: Performed by: PHYSICIAN ASSISTANT

## 2024-02-22 PROCEDURE — 2500000001 HC RX 250 WO HCPCS SELF ADMINISTERED DRUGS (ALT 637 FOR MEDICARE OP): Performed by: PHYSICIAN ASSISTANT

## 2024-02-22 RX ORDER — PREGABALIN 25 MG/1
50 CAPSULE ORAL 3 TIMES DAILY
Status: DISCONTINUED | OUTPATIENT
Start: 2024-02-22 | End: 2024-02-23 | Stop reason: HOSPADM

## 2024-02-22 RX ORDER — ACETAMINOPHEN 325 MG/1
950 TABLET ORAL EVERY 6 HOURS PRN
Status: DISCONTINUED | OUTPATIENT
Start: 2024-02-22 | End: 2024-02-23 | Stop reason: HOSPADM

## 2024-02-22 RX ORDER — ALBUTEROL SULFATE 90 UG/1
2 AEROSOL, METERED RESPIRATORY (INHALATION) EVERY 6 HOURS PRN
COMMUNITY

## 2024-02-22 RX ORDER — TALC
3 POWDER (GRAM) TOPICAL
Status: DISCONTINUED | OUTPATIENT
Start: 2024-02-22 | End: 2024-02-23 | Stop reason: HOSPADM

## 2024-02-22 RX ORDER — POTASSIUM CHLORIDE 20 MEQ/1
20 TABLET, EXTENDED RELEASE ORAL DAILY
COMMUNITY

## 2024-02-22 RX ORDER — KETOROLAC TROMETHAMINE 30 MG/ML
15 INJECTION, SOLUTION INTRAMUSCULAR; INTRAVENOUS EVERY 6 HOURS PRN
Status: DISCONTINUED | OUTPATIENT
Start: 2024-02-22 | End: 2024-02-23 | Stop reason: HOSPADM

## 2024-02-22 RX ORDER — DULOXETIN HYDROCHLORIDE 30 MG/1
60 CAPSULE, DELAYED RELEASE ORAL
Status: DISCONTINUED | OUTPATIENT
Start: 2024-02-22 | End: 2024-02-22

## 2024-02-22 RX ORDER — TRAMADOL HYDROCHLORIDE 50 MG/1
50 TABLET ORAL EVERY 6 HOURS PRN
Status: DISCONTINUED | OUTPATIENT
Start: 2024-02-22 | End: 2024-02-23 | Stop reason: HOSPADM

## 2024-02-22 RX ORDER — FAMOTIDINE 10 MG/ML
20 INJECTION INTRAVENOUS 2 TIMES DAILY
Status: DISCONTINUED | OUTPATIENT
Start: 2024-02-22 | End: 2024-02-23 | Stop reason: HOSPADM

## 2024-02-22 RX ORDER — DULOXETIN HYDROCHLORIDE 30 MG/1
60 CAPSULE, DELAYED RELEASE ORAL DAILY
Status: DISCONTINUED | OUTPATIENT
Start: 2024-02-22 | End: 2024-02-23 | Stop reason: HOSPADM

## 2024-02-22 RX ORDER — SPIRONOLACTONE 25 MG/1
25 TABLET ORAL DAILY
Status: DISCONTINUED | OUTPATIENT
Start: 2024-02-22 | End: 2024-02-23 | Stop reason: HOSPADM

## 2024-02-22 RX ORDER — CALCIUM CARBONATE 300MG(750)
400 TABLET,CHEWABLE ORAL DAILY
Status: ON HOLD | COMMUNITY
End: 2024-02-23 | Stop reason: SDUPTHER

## 2024-02-22 RX ORDER — LOSARTAN POTASSIUM 50 MG/1
100 TABLET ORAL DAILY
Status: DISCONTINUED | OUTPATIENT
Start: 2024-02-22 | End: 2024-02-23 | Stop reason: HOSPADM

## 2024-02-22 RX ORDER — MAGNESIUM SULFATE HEPTAHYDRATE 40 MG/ML
2 INJECTION, SOLUTION INTRAVENOUS ONCE
Status: COMPLETED | OUTPATIENT
Start: 2024-02-22 | End: 2024-02-22

## 2024-02-22 RX ORDER — OMEPRAZOLE 20 MG/1
20 CAPSULE, DELAYED RELEASE ORAL
COMMUNITY
End: 2024-02-23 | Stop reason: HOSPADM

## 2024-02-22 RX ORDER — SODIUM CHLORIDE, SODIUM LACTATE, POTASSIUM CHLORIDE, CALCIUM CHLORIDE 600; 310; 30; 20 MG/100ML; MG/100ML; MG/100ML; MG/100ML
75 INJECTION, SOLUTION INTRAVENOUS CONTINUOUS
Status: DISCONTINUED | OUTPATIENT
Start: 2024-02-22 | End: 2024-02-22

## 2024-02-22 RX ORDER — SODIUM,POTASSIUM PHOSPHATES 280-250MG
1 POWDER IN PACKET (EA) ORAL 4 TIMES DAILY
Status: DISCONTINUED | OUTPATIENT
Start: 2024-02-22 | End: 2024-02-22

## 2024-02-22 RX ORDER — FLUTICASONE PROPIONATE 50 MCG
1 SPRAY, SUSPENSION (ML) NASAL DAILY
COMMUNITY

## 2024-02-22 RX ORDER — POTASSIUM CHLORIDE 14.9 MG/ML
20 INJECTION INTRAVENOUS
Status: COMPLETED | OUTPATIENT
Start: 2024-02-22 | End: 2024-02-22

## 2024-02-22 RX ORDER — FAMOTIDINE 20 MG/1
20 TABLET, FILM COATED ORAL 2 TIMES DAILY
Status: DISCONTINUED | OUTPATIENT
Start: 2024-02-22 | End: 2024-02-23 | Stop reason: HOSPADM

## 2024-02-22 RX ORDER — DOCUSATE SODIUM 100 MG/1
100 CAPSULE, LIQUID FILLED ORAL 2 TIMES DAILY PRN
Status: DISCONTINUED | OUTPATIENT
Start: 2024-02-22 | End: 2024-02-23 | Stop reason: HOSPADM

## 2024-02-22 RX ADMIN — FAMOTIDINE 20 MG: 20 TABLET, FILM COATED ORAL at 12:12

## 2024-02-22 RX ADMIN — DULOXETINE HYDROCHLORIDE 60 MG: 30 CAPSULE, DELAYED RELEASE ORAL at 12:11

## 2024-02-22 RX ADMIN — POTASSIUM PHOSPHATE, MONOBASIC AND POTASSIUM PHOSPHATE, DIBASIC 21 MMOL: 224; 236 INJECTION, SOLUTION, CONCENTRATE INTRAVENOUS at 06:56

## 2024-02-22 RX ADMIN — FAMOTIDINE 20 MG: 20 TABLET, FILM COATED ORAL at 21:05

## 2024-02-22 RX ADMIN — PREGABALIN 50 MG: 25 CAPSULE ORAL at 15:18

## 2024-02-22 RX ADMIN — TRAMADOL HYDROCHLORIDE 50 MG: 50 TABLET, COATED ORAL at 04:34

## 2024-02-22 RX ADMIN — KETOROLAC TROMETHAMINE 15 MG: 30 INJECTION, SOLUTION INTRAMUSCULAR; INTRAVENOUS at 00:11

## 2024-02-22 RX ADMIN — SPIRONOLACTONE 25 MG: 25 TABLET ORAL at 12:14

## 2024-02-22 RX ADMIN — FAMOTIDINE 20 MG: 20 TABLET, FILM COATED ORAL at 01:27

## 2024-02-22 RX ADMIN — TRAMADOL HYDROCHLORIDE 50 MG: 50 TABLET, COATED ORAL at 15:18

## 2024-02-22 RX ADMIN — POTASSIUM CHLORIDE 20 MEQ: 14.9 INJECTION, SOLUTION INTRAVENOUS at 03:21

## 2024-02-22 RX ADMIN — PREGABALIN 50 MG: 25 CAPSULE ORAL at 21:05

## 2024-02-22 RX ADMIN — POTASSIUM CHLORIDE 20 MEQ: 14.9 INJECTION, SOLUTION INTRAVENOUS at 05:44

## 2024-02-22 RX ADMIN — DOCUSATE SODIUM 100 MG: 100 CAPSULE, LIQUID FILLED ORAL at 01:27

## 2024-02-22 RX ADMIN — MAGNESIUM SULFATE HEPTAHYDRATE 2 G: 40 INJECTION, SOLUTION INTRAVENOUS at 04:34

## 2024-02-22 RX ADMIN — Medication 3 MG: at 21:13

## 2024-02-22 RX ADMIN — ACETAMINOPHEN 975 MG: 325 TABLET ORAL at 01:27

## 2024-02-22 RX ADMIN — KETOROLAC TROMETHAMINE 15 MG: 30 INJECTION INTRAMUSCULAR; INTRAVENOUS at 21:05

## 2024-02-22 SDOH — SOCIAL STABILITY: SOCIAL INSECURITY: ARE THERE ANY APPARENT SIGNS OF INJURIES/BEHAVIORS THAT COULD BE RELATED TO ABUSE/NEGLECT?: NO

## 2024-02-22 SDOH — SOCIAL STABILITY: SOCIAL INSECURITY: DO YOU FEEL ANYONE HAS EXPLOITED OR TAKEN ADVANTAGE OF YOU FINANCIALLY OR OF YOUR PERSONAL PROPERTY?: NO

## 2024-02-22 SDOH — SOCIAL STABILITY: SOCIAL INSECURITY: HAS ANYONE EVER THREATENED TO HURT YOUR FAMILY OR YOUR PETS?: NO

## 2024-02-22 SDOH — SOCIAL STABILITY: SOCIAL INSECURITY: ABUSE: ADULT

## 2024-02-22 SDOH — SOCIAL STABILITY: SOCIAL INSECURITY: HAVE YOU HAD THOUGHTS OF HARMING ANYONE ELSE?: NO

## 2024-02-22 SDOH — SOCIAL STABILITY: SOCIAL INSECURITY: ARE YOU OR HAVE YOU BEEN THREATENED OR ABUSED PHYSICALLY, EMOTIONALLY, OR SEXUALLY BY ANYONE?: NO

## 2024-02-22 SDOH — SOCIAL STABILITY: SOCIAL INSECURITY: DO YOU FEEL UNSAFE GOING BACK TO THE PLACE WHERE YOU ARE LIVING?: NO

## 2024-02-22 SDOH — SOCIAL STABILITY: SOCIAL INSECURITY: WERE YOU ABLE TO COMPLETE ALL THE BEHAVIORAL HEALTH SCREENINGS?: YES

## 2024-02-22 SDOH — SOCIAL STABILITY: SOCIAL INSECURITY: DOES ANYONE TRY TO KEEP YOU FROM HAVING/CONTACTING OTHER FRIENDS OR DOING THINGS OUTSIDE YOUR HOME?: NO

## 2024-02-22 ASSESSMENT — COGNITIVE AND FUNCTIONAL STATUS - GENERAL
HELP NEEDED FOR BATHING: A LITTLE
DRESSING REGULAR UPPER BODY CLOTHING: A LITTLE
WALKING IN HOSPITAL ROOM: A LOT
TOILETING: A LITTLE
DRESSING REGULAR LOWER BODY CLOTHING: A LITTLE
WALKING IN HOSPITAL ROOM: A LOT
CLIMB 3 TO 5 STEPS WITH RAILING: A LOT
DRESSING REGULAR LOWER BODY CLOTHING: A LITTLE
DRESSING REGULAR UPPER BODY CLOTHING: A LITTLE
MOVING FROM LYING ON BACK TO SITTING ON SIDE OF FLAT BED WITH BEDRAILS: A LITTLE
DRESSING REGULAR LOWER BODY CLOTHING: A LITTLE
DRESSING REGULAR UPPER BODY CLOTHING: A LITTLE
DRESSING REGULAR UPPER BODY CLOTHING: A LITTLE
TURNING FROM BACK TO SIDE WHILE IN FLAT BAD: A LITTLE
HELP NEEDED FOR BATHING: A LITTLE
HELP NEEDED FOR BATHING: A LITTLE
WALKING IN HOSPITAL ROOM: A LOT
TURNING FROM BACK TO SIDE WHILE IN FLAT BAD: A LITTLE
STANDING UP FROM CHAIR USING ARMS: A LITTLE
DRESSING REGULAR LOWER BODY CLOTHING: A LITTLE
MOVING TO AND FROM BED TO CHAIR: A LITTLE
MOVING TO AND FROM BED TO CHAIR: A LITTLE
TURNING FROM BACK TO SIDE WHILE IN FLAT BAD: A LITTLE
TURNING FROM BACK TO SIDE WHILE IN FLAT BAD: A LITTLE
CLIMB 3 TO 5 STEPS WITH RAILING: A LOT
STANDING UP FROM CHAIR USING ARMS: A LITTLE
MOVING TO AND FROM BED TO CHAIR: A LITTLE
CLIMB 3 TO 5 STEPS WITH RAILING: A LOT
TOILETING: A LOT
DRESSING REGULAR LOWER BODY CLOTHING: A LITTLE
PATIENT BASELINE BEDBOUND: NO
MOVING FROM LYING ON BACK TO SITTING ON SIDE OF FLAT BED WITH BEDRAILS: A LITTLE
MOVING TO AND FROM BED TO CHAIR: A LITTLE
CLIMB 3 TO 5 STEPS WITH RAILING: A LOT
DRESSING REGULAR UPPER BODY CLOTHING: A LITTLE
TURNING FROM BACK TO SIDE WHILE IN FLAT BAD: A LITTLE
MOVING TO AND FROM BED TO CHAIR: A LITTLE
STANDING UP FROM CHAIR USING ARMS: A LITTLE
MOVING FROM LYING ON BACK TO SITTING ON SIDE OF FLAT BED WITH BEDRAILS: A LITTLE
CLIMB 3 TO 5 STEPS WITH RAILING: A LOT
MOVING FROM LYING ON BACK TO SITTING ON SIDE OF FLAT BED WITH BEDRAILS: A LITTLE
MOVING TO AND FROM BED TO CHAIR: A LITTLE
MOVING FROM LYING ON BACK TO SITTING ON SIDE OF FLAT BED WITH BEDRAILS: A LITTLE
STANDING UP FROM CHAIR USING ARMS: A LOT
MOBILITY SCORE: 16
DAILY ACTIVITIY SCORE: 17
WALKING IN HOSPITAL ROOM: A LOT
TOILETING: A LITTLE
TOILETING: A LITTLE
PERSONAL GROOMING: A LITTLE
HELP NEEDED FOR BATHING: A LITTLE
MOBILITY SCORE: 16
CLIMB 3 TO 5 STEPS WITH RAILING: A LOT
DRESSING REGULAR UPPER BODY CLOTHING: A LITTLE
HELP NEEDED FOR BATHING: A LITTLE
WALKING IN HOSPITAL ROOM: A LOT
WALKING IN HOSPITAL ROOM: A LOT
STANDING UP FROM CHAIR USING ARMS: A LITTLE
TOILETING: A LITTLE
DAILY ACTIVITIY SCORE: 20
PATIENT BASELINE BEDBOUND: NO
CLIMB 3 TO 5 STEPS WITH RAILING: A LOT
TURNING FROM BACK TO SIDE WHILE IN FLAT BAD: A LITTLE
DAILY ACTIVITIY SCORE: 20
TURNING FROM BACK TO SIDE WHILE IN FLAT BAD: A LITTLE
MOVING FROM LYING ON BACK TO SITTING ON SIDE OF FLAT BED WITH BEDRAILS: A LITTLE
STANDING UP FROM CHAIR USING ARMS: A LITTLE
TOILETING: A LITTLE
STANDING UP FROM CHAIR USING ARMS: A LITTLE
DRESSING REGULAR LOWER BODY CLOTHING: A LOT
WALKING IN HOSPITAL ROOM: A LOT
MOVING TO AND FROM BED TO CHAIR: A LITTLE
MOBILITY SCORE: 15
DRESSING REGULAR LOWER BODY CLOTHING: A LITTLE
TOILETING: A LITTLE
MOVING FROM LYING ON BACK TO SITTING ON SIDE OF FLAT BED WITH BEDRAILS: A LITTLE
HELP NEEDED FOR BATHING: A LITTLE
DRESSING REGULAR UPPER BODY CLOTHING: A LITTLE
HELP NEEDED FOR BATHING: A LITTLE

## 2024-02-22 ASSESSMENT — LIFESTYLE VARIABLES
HOW OFTEN DO YOU HAVE 6 OR MORE DRINKS ON ONE OCCASION: NEVER
SKIP TO QUESTIONS 9-10: 1
HOW OFTEN DO YOU HAVE A DRINK CONTAINING ALCOHOL: NEVER
AUDIT-C TOTAL SCORE: 0
PRESCIPTION_ABUSE_PAST_12_MONTHS: NO
HOW MANY STANDARD DRINKS CONTAINING ALCOHOL DO YOU HAVE ON A TYPICAL DAY: 1 OR 2
AUDIT-C TOTAL SCORE: 0
SUBSTANCE_ABUSE_PAST_12_MONTHS: NO

## 2024-02-22 ASSESSMENT — ENCOUNTER SYMPTOMS
ALLERGIC/IMMUNOLOGIC NEGATIVE: 1
ABDOMINAL PAIN: 0
PSYCHIATRIC NEGATIVE: 1
EYES NEGATIVE: 1
RESPIRATORY NEGATIVE: 1
ENDOCRINE NEGATIVE: 1
MYALGIAS: 1
CARDIOVASCULAR NEGATIVE: 1
FATIGUE: 1
HEMATOLOGIC/LYMPHATIC NEGATIVE: 1
NEUROLOGICAL NEGATIVE: 1
DIARRHEA: 1

## 2024-02-22 ASSESSMENT — PATIENT HEALTH QUESTIONNAIRE - PHQ9
SUM OF ALL RESPONSES TO PHQ9 QUESTIONS 1 & 2: 2
2. FEELING DOWN, DEPRESSED OR HOPELESS: SEVERAL DAYS
1. LITTLE INTEREST OR PLEASURE IN DOING THINGS: SEVERAL DAYS

## 2024-02-22 ASSESSMENT — ACTIVITIES OF DAILY LIVING (ADL)
DRESSING YOURSELF: NEEDS ASSISTANCE
BATHING: NEEDS ASSISTANCE
FEEDING YOURSELF: NEEDS ASSISTANCE
GROOMING: INDEPENDENT
LACK_OF_TRANSPORTATION: NO
HEARING - LEFT EAR: FUNCTIONAL
HEARING - RIGHT EAR: FUNCTIONAL
ADEQUATE_TO_COMPLETE_ADL: YES
WALKS IN HOME: NEEDS ASSISTANCE
PATIENT'S MEMORY ADEQUATE TO SAFELY COMPLETE DAILY ACTIVITIES?: YES
LACK_OF_TRANSPORTATION: NO
ASSISTIVE_DEVICE: CRUTCHES
JUDGMENT_ADEQUATE_SAFELY_COMPLETE_DAILY_ACTIVITIES: YES
TOILETING: NEEDS ASSISTANCE

## 2024-02-22 ASSESSMENT — PAIN SCALES - GENERAL
PAINLEVEL_OUTOF10: 10 - WORST POSSIBLE PAIN
PAINLEVEL_OUTOF10: 8
PAINLEVEL_OUTOF10: 6
PAINLEVEL_OUTOF10: 10 - WORST POSSIBLE PAIN
PAINLEVEL_OUTOF10: 8
PAINLEVEL_OUTOF10: 10 - WORST POSSIBLE PAIN

## 2024-02-22 ASSESSMENT — PAIN DESCRIPTION - DESCRIPTORS: DESCRIPTORS: CRAMPING;SPASM

## 2024-02-22 ASSESSMENT — PAIN - FUNCTIONAL ASSESSMENT
PAIN_FUNCTIONAL_ASSESSMENT: 0-10

## 2024-02-22 ASSESSMENT — PAIN DESCRIPTION - FREQUENCY: FREQUENCY: INTERMITTENT

## 2024-02-22 ASSESSMENT — PAIN DESCRIPTION - LOCATION: LOCATION: LEG

## 2024-02-22 ASSESSMENT — PAIN DESCRIPTION - PAIN TYPE: TYPE: ACUTE PAIN

## 2024-02-22 NOTE — CARE PLAN
0230: Pt admits to this unit via stretcher, accompanied with male friend to unit. No distress observed. Assessments completed. IV administration began per order. No adverse reactions observed. Med verified and physician made aware. Labs collected. Tolerated well. Plan of care discussed with pt in which she verbalizes understanding. Bed locked and lowered. Education provided regarding use of call light, bed controls, and location of restroom. Call light placed within reach.     0330: Additional peripheral IV placed for additional electrolytes to be administered. Tolerated well. Unable to collect urine specimen as pt states that she forgot to get it. Education and encouragement provided. Physician made aware.         The clinical goals for the shift include display no adverse reactions to administration of electrolytes via peripheral IV      Problem: Pain  Goal: Takes deep breaths with improved pain control throughout the shift  Outcome: Progressing  Goal: Turns in bed with improved pain control throughout the shift  Outcome: Progressing  Goal: Walks with improved pain control throughout the shift  Outcome: Progressing  Goal: Performs ADL's with improved pain control throughout shift  Outcome: Progressing  Goal: Participates in PT with improved pain control throughout the shift  Outcome: Progressing  Goal: Free from opioid side effects throughout the shift  Outcome: Progressing  Goal: Free from acute confusion related to pain meds throughout the shift  Outcome: Progressing

## 2024-02-22 NOTE — ED PROVIDER NOTES
HPI   Chief Complaint   Patient presents with    Shaking       HPI  Patient is a 43-year-old female with a past medical history significant for hypokalemia, hypomagnesemia chronically with unknown etiology currently on oral supplementation, asthma, anxiety, neuropraxia of the left lower extremity, hypertension who presented to the emergency room due to concern for electrolyte imbalance.  Patient states that today she started feeling shaky, little lightheaded, crampy all over and feeling weird.  She states that this is exactly how she feels when her electrolytes are off.  She has been compliant with her home potassium and magnesium but states that she knows that it is low as she was here for the same thing the other day.  She has been having these issues since having a cholecystectomy last year and they have yet to identify the cause.  Denies other symptoms at this time.      PMHx: As above  PSHx: As above  FamilyHx: Denies pertinent  SocialHx: Denies  Allergies: NKDA  Medications: See Medication Reconciliation     ROS  As above but otherwise denies    Physical Exam    GENERAL: Awake and Alert, No Acute Distress  HEENT: AT/NC, PERRL, EOMI, Normal Oropharynx, No Signs of Dehydration  NECK: Normal Inspection, No JVD  CARDIOVASCULAR: RRR, No M/R/G  RESPIRATORY: CTA Bilaterally, No Wheezes, Rales or Rhonchi, Chest Wall Non-tender  ABDOMEN: Soft, non-tender abdomen, Normal Bowel Sounds, No Distention  BACK: No CVA Tenderness  SKIN: Normal Color, Warm, Dry, No Rashes   EXTREMITIES: Non-Tender, Full ROM, No Pedal Edema  NEURO: A&O x 3, Normal Motor and Sensation, Normal Mood and Affect    Nursing Assessment and Vitals Reviewed    EKG showed a normal sinus rhythm at 83 bpm.  There are no significant interval prolongations or ischemic ST changes.  There is no axis deviation.      EKG showed    Medical Decision  Patient is a 43-year-old female with a past medical history significant for hypokalemia, hypomagnesemia chronically  with unknown etiology currently on oral supplementation, asthma, anxiety, neuropraxia of the left lower extremity, hypertension who presented to the emergency room due to concern for electrolyte imbalance.  Patient states that today she started feeling shaky, little lightheaded, crampy all over and feeling weird.  She states that this is exactly how she feels when her electrolytes are off.  She has been compliant with her home potassium and magnesium but states that she knows that it is low as she was here for the same thing the other day.  She has been having these issues since having a cholecystectomy last year and they have yet to identify the cause.  Denies other symptoms at this time.    On arrival patient is very well-appearing and in no acute distress.  Lungs are clear and heart is regular.    Workup for patient revealed hypokalemia, hypomagnesemia and hypophosphatemia.  All were repleted.  Patient was given Toradol for whole body discomfort which is usual with her episodes.  Given recurrent visits to the emergency room she is admitted for further workup and management.                            [place] Coma Scale Score: 15                     Patient History   Past Medical History:   Diagnosis Date    Allergic rhinitis 05/21/2007    Essential hypertension 10/06/2003    Mild persistent asthma 10/06/2003    Neuropraxia of left lower extremity 10/17/2023     No past surgical history on file.  No family history on file.  Social History     Tobacco Use    Smoking status: Every Day     Types: Cigarettes    Smokeless tobacco: Never   Substance Use Topics    Alcohol use: Yes    Drug use: Not Currently       Physical Exam   ED Triage Vitals [02/21/24 2105]   Temperature Heart Rate Respirations BP   35.9 °C (96.7 °F) (!) 125 18 (!) 138/95      Pulse Ox Temp Source Heart Rate Source Patient Position   98 % Temporal Monitor Sitting      BP Location FiO2 (%)     Left arm --       Physical Exam    ED Course & MDM    Diagnoses as of 02/22/24 0112   Electrolyte abnormality       Medical Decision Making      Procedure  Procedures     Nicki Whitfield MD  02/22/24 0112       Nicki Whitfield MD  02/22/24 0318

## 2024-02-22 NOTE — H&P
History Of Present Illness  Sharla Ramirez is a 43 y.o. female with PMH significant for chronic electrolyte abnormalities (hypomagnesemia, hypokalemia) and chronic transaminitis who presented with c/o electrolyte imbalance.  Endorses cramping in her legs and hands and feeling shaky. Reports these symptoms consistent when her electrolyte are low. She is on oral magnesium and potassium, and has been compliant with these medications.  Endorses chronic loose stools (3 daily) since having her gallbladder out, but denies changes in her bowel habits. Denies abdominal pain, fever, chills, nausea, vomiting, SOB, CP, palpitations, or syncope.  Endorses daily nicotine use.  Social ETOH on the weekends. Denies illicit substances.      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       Surgical History  Past Surgical History:   Procedure Laterality Date    CHOLECYSTECTOMY         Social History  Social History     Socioeconomic History    Marital status: Single     Spouse name: None    Number of children: None    Years of education: None    Highest education level: None   Occupational History    None   Tobacco Use    Smoking status: Every Day     Types: Cigarettes    Smokeless tobacco: Never   Substance and Sexual Activity    Alcohol use: Yes     Comment: sociall on weekends    Drug use: Not Currently    Sexual activity: None   Other Topics Concern    None   Social History Narrative    None     Social Determinants of Health     Financial Resource Strain: High Risk (10/17/2023)    Overall Financial Resource Strain (CARDIA)     Difficulty of Paying Living Expenses: Hard   Food Insecurity: Not on file   Transportation Needs: Unmet Transportation Needs (10/17/2023)    PRAPARE - Transportation     Lack of Transportation (Medical): Yes     Lack of Transportation  (Non-Medical): Yes   Physical Activity: Insufficiently Active (10/17/2023)    Exercise Vital Sign     Days of Exercise per Week: 1 day     Minutes of Exercise per Session: 10 min   Stress: Not on file   Social Connections: Not on file   Intimate Partner Violence: Not on file   Housing Stability: High Risk (10/17/2023)    Housing Stability Vital Sign     Unable to Pay for Housing in the Last Year: Yes     Number of Places Lived in the Last Year: 1     Unstable Housing in the Last Year: Yes        Family History  No family history on file.     Allergies  Allergies as of 02/21/2024 - Reviewed 02/21/2024   Allergen Reaction Noted    Latex Rash 08/23/2016    Morphine Hives and Itching 10/08/2002        Review of Systems  Review of Systems   Constitutional:  Positive for fatigue.   HENT: Negative.     Eyes: Negative.    Respiratory: Negative.     Cardiovascular: Negative.    Gastrointestinal:  Positive for diarrhea. Negative for abdominal pain.   Endocrine: Negative.    Genitourinary: Negative.    Musculoskeletal:  Positive for myalgias.   Skin: Negative.    Allergic/Immunologic: Negative.    Neurological: Negative.    Hematological: Negative.    Psychiatric/Behavioral: Negative.     All other systems reviewed and are negative.      Relevant results reviewed   PROVIDER notes  Nursing notes  MEDS:  Current Facility-Administered Medications   Medication Dose Route Frequency Provider Last Rate Last Admin    acetaminophen (Tylenol) tablet 975 mg  975 mg oral q6h PRN Diamante Morales PA-C   975 mg at 02/22/24 0127    docusate sodium (Colace) capsule 100 mg  100 mg oral BID PRN Diamante Morales PA-C   100 mg at 02/22/24 0127    DULoxetine (Cymbalta) DR capsule 60 mg  60 mg oral Daily Beatris Keyes PharmD        famotidine (Pepcid) tablet 20 mg  20 mg oral BID Diamante Morales PA-C   20 mg at 02/22/24 0127    Or    famotidine PF (Pepcid) injection 20 mg  20 mg intravenous BID Diamante Morales PA-C        ketorolac (Toradol)  injection 15 mg  15 mg intravenous q6h PRN Diamante Morales PA-C        losartan (Cozaar) tablet 100 mg  100 mg oral Daily Diamante Morales PA-C        magnesium sulfate IV 2 g  2 g intravenous Once Diamante Morales PA-C        melatonin tablet 3 mg  3 mg oral Daily Diamante Morales PA-C        potassium chloride 20 mEq in 100 mL IV premix  20 mEq intravenous q2h DIAN PorterC 50 mL/hr at 02/22/24 0321 20 mEq at 02/22/24 0321    potassium phosphates 21 mmol in dextrose 5 % in water (D5W) 250 mL IV  21 mmol intravenous Once Diamante Morales PA-C        pregabalin (Lyrica) capsule 50 mg  50 mg oral TID Diamante Morales PA-C        spironolactone (Aldactone) tablet 25 mg  25 mg oral Daily Diamante Morales PA-C        traMADol (Ultram) tablet 50 mg  50 mg oral q6h PRN Diamante Morales PA-C          LABS:  Results for orders placed or performed during the hospital encounter of 02/21/24 (from the past 24 hour(s))   CBC and Auto Differential   Result Value Ref Range    WBC 7.1 4.4 - 11.3 x10*3/uL    nRBC 0.0 0.0 - 0.0 /100 WBCs    RBC 3.58 (L) 4.00 - 5.20 x10*6/uL    Hemoglobin 12.2 12.0 - 16.0 g/dL    Hematocrit 34.9 (L) 36.0 - 46.0 %    MCV 98 80 - 100 fL    MCH 34.1 (H) 26.0 - 34.0 pg    MCHC 35.0 32.0 - 36.0 g/dL    RDW 12.5 11.5 - 14.5 %    Platelets 324 150 - 450 x10*3/uL    Neutrophils % 72.7 40.0 - 80.0 %    Immature Granulocytes %, Automated 0.4 0.0 - 0.9 %    Lymphocytes % 17.1 13.0 - 44.0 %    Monocytes % 9.3 2.0 - 10.0 %    Eosinophils % 0.1 0.0 - 6.0 %    Basophils % 0.4 0.0 - 2.0 %    Neutrophils Absolute 5.13 1.20 - 7.70 x10*3/uL    Immature Granulocytes Absolute, Automated 0.03 0.00 - 0.70 x10*3/uL    Lymphocytes Absolute 1.21 1.20 - 4.80 x10*3/uL    Monocytes Absolute 0.66 0.10 - 1.00 x10*3/uL    Eosinophils Absolute 0.01 0.00 - 0.70 x10*3/uL    Basophils Absolute 0.03 0.00 - 0.10 x10*3/uL   Comprehensive metabolic panel   Result Value Ref Range    Glucose 111 (H) 74 - 99 mg/dL    Sodium 138 136 -  "145 mmol/L    Potassium 3.0 (L) 3.5 - 5.3 mmol/L    Chloride 98 98 - 107 mmol/L    Bicarbonate 22 21 - 32 mmol/L    Anion Gap 21 (H) 10 - 20 mmol/L    Urea Nitrogen 5 (L) 6 - 23 mg/dL    Creatinine 0.69 0.50 - 1.05 mg/dL    eGFR >90 >60 mL/min/1.73m*2    Calcium 7.8 (L) 8.6 - 10.3 mg/dL    Albumin 3.8 3.4 - 5.0 g/dL    Alkaline Phosphatase 121 (H) 33 - 110 U/L    Total Protein 7.3 6.4 - 8.2 g/dL    AST 89 (H) 9 - 39 U/L    Bilirubin, Total 0.6 0.0 - 1.2 mg/dL    ALT 50 (H) 7 - 45 U/L   Magnesium   Result Value Ref Range    Magnesium 0.80 (L) 1.60 - 2.40 mg/dL   Phosphorus   Result Value Ref Range    Phosphorus 1.0 (L) 2.5 - 4.9 mg/dL   Ethanol   Result Value Ref Range    Alcohol <10 <=10 mg/dL   Lactate   Result Value Ref Range    Lactate 0.8 0.4 - 2.0 mmol/L   BLOOD GAS VENOUS FULL PANEL   Result Value Ref Range    POCT pH, Venous 7.49 (H) 7.33 - 7.43 pH    POCT pCO2, Venous 36 (L) 41 - 51 mm Hg    POCT pO2, Venous 93 (H) 35 - 45 mm Hg    POCT SO2, Venous 100 (H) 45 - 75 %    POCT Oxy Hemoglobin, Venous 96.4 (H) 45.0 - 75.0 %    POCT Hematocrit Calculated, Venous 33.0 (L) 36.0 - 46.0 %    POCT Sodium, Venous 137 136 - 145 mmol/L    POCT Potassium, Venous 3.0 (L) 3.5 - 5.3 mmol/L    POCT Chloride, Venous 104 98 - 107 mmol/L    POCT Ionized Calicum, Venous 1.03 (L) 1.10 - 1.33 mmol/L    POCT Glucose, Venous 103 (H) 74 - 99 mg/dL    POCT Lactate, Venous 1.1 0.4 - 2.0 mmol/L    POCT Base Excess, Venous 4.0 (H) -2.0 - 3.0 mmol/L    POCT HCO3 Calculated, Venous 27.4 (H) 22.0 - 26.0 mmol/L    POCT Hemoglobin, Venous 11.1 (L) 12.0 - 16.0 g/dL    POCT Anion Gap, Venous 9.0 (L) 10.0 - 25.0 mmol/L    Patient Temperature 37.0 degrees Celsius    FiO2 21 %      IMAGING:  No orders to display          PHYSICAL EXAM  /90 (BP Location: Left arm, Patient Position: Lying)   Pulse 80   Temp 36.7 °C (98.1 °F) (Temporal)   Resp 18   Ht 1.676 m (5' 6\")   Wt 74.8 kg (165 lb)   SpO2 96%   BMI 26.63 kg/m²   PHYSICAL " EXAM:  GENERAL: Alert, NAD, cooperative  SKIN: Warm and dry.  No suspicious lesions or rashes.  HEENT:  NCAT, PERRLA, EOMI, nonicteric sclera, MMM, neck supple, trachea midline  LUNGS: Unlabored, no significant wheezing, rhonchi, or rales appreciated  CARDS: RRR, no m/g/r appreciated  GI: Soft, NTND, BS+, no rebound, no guarding   : no moran, voids independently   MS/Extremities: WWP, distal pulses intact, moves all extremities  NEURO: A&Ox3, grossly nonfocal exam, speech fluent, follows commands, answers questions appropriately  PSYCH: mood and behavior appropriate    Assessment/Plan:   Principal Problem:    Electrolyte abnormality  Active Problems:    Hypokalemia    Essential hypertension    Hypomagnesemia    Nicotine use    Transaminitis    Hypophosphatemia  -CBC: no leukocytosis, no acute anemia, no thrombocytopenia  -CMP: potassium 3.0, glucose 111,  BUN 5, creatinine normal, no acute renal dysfunction appreciated, AST 89, ALT 50, Alk phos 121 which appears chronic for her Tbili WNL,   -phos: 1.0 --> 30 mmol Kphos IV ordered   -M.80 --> 4G IV magnesium ordered --> give prior to potassium   -40 mEq KCL IV ordered   -repeat labs timed for PM draw to ensure adequate time for all IV repletion to be given --> additional repletion may be needed pending results   -check Utox  -monitor on tele  -resume home meds as appropriate  -GI ppx: Protonix, bowel regimen  -VTE ppx: Ambulation. SCDs.     Total time spent [including but not limited to]: Obtaining and reviewing patient medical records/history, obtaining a separate history,  examining and assessing the patient, providing  and education, placing pertinent orders for labs/tests/medications,  communicating with the patient/family/health team, documenting in the patient's EMR/formulation of this note, independently interpreting results and data, coordinating care:   55 minutes, with greater than 50% spent in personal discussion with patient and/or  family    Diamante Morales PA-C

## 2024-02-22 NOTE — PROGRESS NOTES
"Sharla Ramirez is a 43 y.o. female on day 0 of admission presenting with Electrolyte abnormality.    Subjective   Awake in bed, just endorses pain        Objective     Physical Exam  Constitutional:       Appearance: Normal appearance.   Cardiovascular:      Rate and Rhythm: Normal rate and regular rhythm.      Pulses: Normal pulses.      Heart sounds: Normal heart sounds. No murmur heard.     No gallop.   Pulmonary:      Effort: Pulmonary effort is normal. No respiratory distress.      Breath sounds: Normal breath sounds. No wheezing or rhonchi.   Abdominal:      General: Abdomen is flat. There is no distension.      Palpations: Abdomen is soft.      Tenderness: There is no abdominal tenderness. There is no guarding.   Musculoskeletal:         General: Normal range of motion.   Skin:     General: Skin is warm.      Capillary Refill: Capillary refill takes less than 2 seconds.   Neurological:      Mental Status: She is alert and oriented to person, place, and time.   Psychiatric:         Mood and Affect: Mood normal.         Thought Content: Thought content normal.         Judgment: Judgment normal.         Last Recorded Vitals  Blood pressure (!) 139/96, pulse 71, temperature 36.5 °C (97.7 °F), temperature source Temporal, resp. rate 18, height 1.676 m (5' 6\"), weight 74.8 kg (165 lb), SpO2 97 %.  Intake/Output last 3 Shifts:  No intake/output data recorded.    Relevant Results  Scheduled medications  DULoxetine, 60 mg, oral, Daily  famotidine, 20 mg, oral, BID   Or  famotidine, 20 mg, intravenous, BID  losartan, 100 mg, oral, Daily  melatonin, 3 mg, oral, Daily  potassium phosphate, 21 mmol, intravenous, Once  pregabalin, 50 mg, oral, TID  spironolactone, 25 mg, oral, Daily      Continuous medications     PRN medications  PRN medications: acetaminophen, docusate sodium, ketorolac, traMADol    Results for orders placed or performed during the hospital encounter of 02/21/24 (from the past 24 hour(s))   CBC and " Auto Differential   Result Value Ref Range    WBC 7.1 4.4 - 11.3 x10*3/uL    nRBC 0.0 0.0 - 0.0 /100 WBCs    RBC 3.58 (L) 4.00 - 5.20 x10*6/uL    Hemoglobin 12.2 12.0 - 16.0 g/dL    Hematocrit 34.9 (L) 36.0 - 46.0 %    MCV 98 80 - 100 fL    MCH 34.1 (H) 26.0 - 34.0 pg    MCHC 35.0 32.0 - 36.0 g/dL    RDW 12.5 11.5 - 14.5 %    Platelets 324 150 - 450 x10*3/uL    Neutrophils % 72.7 40.0 - 80.0 %    Immature Granulocytes %, Automated 0.4 0.0 - 0.9 %    Lymphocytes % 17.1 13.0 - 44.0 %    Monocytes % 9.3 2.0 - 10.0 %    Eosinophils % 0.1 0.0 - 6.0 %    Basophils % 0.4 0.0 - 2.0 %    Neutrophils Absolute 5.13 1.20 - 7.70 x10*3/uL    Immature Granulocytes Absolute, Automated 0.03 0.00 - 0.70 x10*3/uL    Lymphocytes Absolute 1.21 1.20 - 4.80 x10*3/uL    Monocytes Absolute 0.66 0.10 - 1.00 x10*3/uL    Eosinophils Absolute 0.01 0.00 - 0.70 x10*3/uL    Basophils Absolute 0.03 0.00 - 0.10 x10*3/uL   Comprehensive metabolic panel   Result Value Ref Range    Glucose 111 (H) 74 - 99 mg/dL    Sodium 138 136 - 145 mmol/L    Potassium 3.0 (L) 3.5 - 5.3 mmol/L    Chloride 98 98 - 107 mmol/L    Bicarbonate 22 21 - 32 mmol/L    Anion Gap 21 (H) 10 - 20 mmol/L    Urea Nitrogen 5 (L) 6 - 23 mg/dL    Creatinine 0.69 0.50 - 1.05 mg/dL    eGFR >90 >60 mL/min/1.73m*2    Calcium 7.8 (L) 8.6 - 10.3 mg/dL    Albumin 3.8 3.4 - 5.0 g/dL    Alkaline Phosphatase 121 (H) 33 - 110 U/L    Total Protein 7.3 6.4 - 8.2 g/dL    AST 89 (H) 9 - 39 U/L    Bilirubin, Total 0.6 0.0 - 1.2 mg/dL    ALT 50 (H) 7 - 45 U/L   Magnesium   Result Value Ref Range    Magnesium 0.80 (L) 1.60 - 2.40 mg/dL   Phosphorus   Result Value Ref Range    Phosphorus 1.0 (L) 2.5 - 4.9 mg/dL   Ethanol   Result Value Ref Range    Alcohol <10 <=10 mg/dL   ECG 12 lead   Result Value Ref Range    Ventricular Rate 83 BPM    Atrial Rate 83 BPM    AZ Interval 148 ms    QRS Duration 84 ms    QT Interval 400 ms    QTC Calculation(Bazett) 470 ms    P Axis 60 degrees    R Axis 0 degrees    T  Axis 45 degrees    QRS Count 14 beats    Q Onset 221 ms    P Onset 147 ms    P Offset 199 ms    T Offset 421 ms    QTC Fredericia 446 ms   Lactate   Result Value Ref Range    Lactate 0.8 0.4 - 2.0 mmol/L   BLOOD GAS VENOUS FULL PANEL   Result Value Ref Range    POCT pH, Venous 7.49 (H) 7.33 - 7.43 pH    POCT pCO2, Venous 36 (L) 41 - 51 mm Hg    POCT pO2, Venous 93 (H) 35 - 45 mm Hg    POCT SO2, Venous 100 (H) 45 - 75 %    POCT Oxy Hemoglobin, Venous 96.4 (H) 45.0 - 75.0 %    POCT Hematocrit Calculated, Venous 33.0 (L) 36.0 - 46.0 %    POCT Sodium, Venous 137 136 - 145 mmol/L    POCT Potassium, Venous 3.0 (L) 3.5 - 5.3 mmol/L    POCT Chloride, Venous 104 98 - 107 mmol/L    POCT Ionized Calicum, Venous 1.03 (L) 1.10 - 1.33 mmol/L    POCT Glucose, Venous 103 (H) 74 - 99 mg/dL    POCT Lactate, Venous 1.1 0.4 - 2.0 mmol/L    POCT Base Excess, Venous 4.0 (H) -2.0 - 3.0 mmol/L    POCT HCO3 Calculated, Venous 27.4 (H) 22.0 - 26.0 mmol/L    POCT Hemoglobin, Venous 11.1 (L) 12.0 - 16.0 g/dL    POCT Anion Gap, Venous 9.0 (L) 10.0 - 25.0 mmol/L    Patient Temperature 37.0 degrees Celsius    FiO2 21 %   DRUG SCREEN,URINE   Result Value Ref Range    Amphetamine Screen, Urine Presumptive Negative Presumptive Negative    Barbiturate Screen, Urine Presumptive Negative Presumptive Negative    Benzodiazepines Screen, Urine Presumptive Negative Presumptive Negative    Cannabinoid Screen, Urine Presumptive Negative Presumptive Negative    Cocaine Metabolite Screen, Urine Presumptive Negative Presumptive Negative    Fentanyl Screen, Urine Presumptive Negative Presumptive Negative    Opiate Screen, Urine Presumptive Negative Presumptive Negative    Oxycodone Screen, Urine Presumptive Negative Presumptive Negative    PCP Screen, Urine Presumptive Negative Presumptive Negative       ECG 12 lead    Result Date: 2/22/2024  Normal sinus rhythm Possible Left atrial enlargement Borderline ECG When compared with ECG of 19-FEB-2024 10:06, No  significant change was found    ECG 12 lead    Result Date: 2/21/2024  Sinus tachycardia Otherwise normal ECG When compared with ECG of 13-JAN-2024 19:18, Nonspecific T wave abnormality no longer evident in Inferior leads See ED provider note for full interpretation and clinical correlation Confirmed by Olivia Loredo ([id number]) on 2/21/2024 10:36:52 AM    XR chest 2 views    Result Date: 2/19/2024  Interpreted By:  Arminda Thomas, STUDY: XR CHEST 2 VIEWS;  2/19/2024 9:48 am   INDICATION: Signs/Symptoms:SOB.   COMPARISON: 10/16/2023   ACCESSION NUMBER(S): [id number]   ORDERING CLINICIAN: SACHA BEDOYA   FINDINGS:         CARDIOMEDIASTINAL SILHOUETTE: Cardiomediastinal silhouette is normal in size and configuration.   LUNGS: Lungs are clear.   ABDOMEN: No remarkable upper abdominal findings.   BONES: No acute osseous changes.       1.  No evidence of acute cardiopulmonary process.       MACRO: None   Signed by: Arminda Thomas 2/19/2024 12:43 PM Dictation workstation:   [id number]                  Assessment/Plan   Principal Problem:    Electrolyte abnormality  Active Problems:    Hypokalemia    Essential hypertension    Hypomagnesemia    Nicotine use    Transaminitis    Hypophosphatemia    Sharla Ramirez is a 43 y.o. female with PMH significant for chronic electrolyte abnormalities (hypomagnesemia, hypokalemia) and chronic transaminitis who presented with c/o electrolyte imbalance.  Endorses cramping in her legs and hands and feeling shaky. Reports these symptoms consistent when her electrolyte are low. She is on oral magnesium and potassium, and has been compliant with these medications.  Endorses chronic loose stools (3 daily) since having her gallbladder out, but denies changes in her bowel habits. Denies abdominal pain, fever, chills, nausea, vomiting, SOB, CP, palpitations, or syncope.  Endorses daily nicotine use.  Social ETOH on the weekends. Denies illicit substances.         Abn labs  -acute on chronic  hypokalmeia and hypomagnesia   -K 3.0 on admission, received IV supplement in ED  -mag 0.8 on admission received IV supplement in ED   -repeat labs ordered   -not taking aldactone at home? Recommend resuming for hypokalemia     DVT prophylaxis:  Lovenox, SCD    DC plan:  DC home when medically stable      Labs/Testing reviewed    Interdisciplinary team rounding completed with hospitalist, nurse, TCC    NP discussed plan and lab/testing results with Dr. Campos. DC pending on repeat labs.        2000 unclear why labs ordered were never drawn on patient. Lab orders were placed at 8:59 AM, there were not drawn. They were then placed at again STAT at 1538. These again were not drawn. The labs again were placed on 1659 and the lab was called by this provider to notify. It is unclear if these labs were hemolyzed or not drawn. Did call the lab who said they were hemolyzed however were not on the list to be redrawn. Reorded as stat again, unclear why labs ordered 12 hrs have not been drawn and resulted     I spent 50  minutes in the professional and overall care of this patient.      Chantelle Fernando, APRN-CNP

## 2024-02-22 NOTE — PROGRESS NOTES
Sharla Ramirez is a 43 y.o. female on day 0 of admission presenting with Electrolyte abnormality.     02/22/24 1043   Discharge Planning   Living Arrangements Spouse/significant other;Children   Support Systems Spouse/significant other;Children   Assistance Needed dependent with some ADL's, uses crutches at home   Type of Residence Private residence   Number of Stairs to Enter Residence 0   Number of Stairs Within Residence 14   Do you have animals or pets at home? No   Home or Post Acute Services In home services   Type of Home Care Services Home PT;Home OT   Patient expects to be discharged to: Home with family   Does the patient need discharge transport arranged? Yes   RoundTrip coordination needed? Yes   Has discharge transport been arranged? No   Financial Resource Strain   How hard is it for you to pay for the very basics like food, housing, medical care, and heating? Not very   Housing Stability   In the last 12 months, was there a time when you were not able to pay the mortgage or rent on time? N   In the last 12 months, was there a time when you did not have a steady place to sleep or slept in a shelter (including now)? N   Transportation Needs   In the past 12 months, has lack of transportation kept you from medical appointments or from getting medications? no   In the past 12 months, has lack of transportation kept you from meetings, work, or from getting things needed for daily living? No   Patient Choice   Provider Choice list and CMS website (https://medicare.gov/care-compare#search) for post-acute Quality and Resource Measure Data were provided and reviewed with: Patient   Patient / Family choosing to utilize agency / facility established prior to hospitalization Yes     Spoke with patient to discuss her preferences for care. Discussed how patient manages health at home. Lives home with children. Independent in most ADLS, does use crutches since her nerve damage that happened a little over a year  ago.  No home O2 or dialysis. Patient denies any problems getting to appointments or obtaining/affording medications.  Did state she was getting outpatient therapy at an orthopedic center, but claims she has been having trouble finding transportation there. Discussed home health care options. Patient/family provided disclosure statement and agency listing. She would like to have a home care close to her. Informed MD/NP and home care liaison. Sent referrals to home care to see who is available to accept. Final home care orders need to be sent upon DC from hospital. No additional resources or needs identified.    Plan: admitted for electrolyte imbalances, receiving IV electrolyte replacement.   Status: OBS  Payor: [employer]  Disposition: Home// HHC  Barrier: labs  ADOD:   tomorrow      Daly Pastrana RN

## 2024-02-22 NOTE — PROGRESS NOTES
Pharmacy Medication History Review    Sharla Ramirez is a 43 y.o. female admitted for Electrolyte abnormality. Pharmacy reviewed the patient's [id number] medications and allergies for   accuracy.    Below are additional concerns with the patient's PTA list.  Collected from patient and pharmacy records.  Patient states she still takes Lyrica, per [place] last fill was in September 2023 for 30 day supply so I removed it from the medication list.     The list below reflectives the updated PTA list. Please review each medication in order reconciliation for additional clarification and justification.    Medications Prior to Admission   Medication Sig Dispense Refill Last Dose    albuterol 90 mcg/actuation inhaler Inhale 2 puffs every 6 hours if needed for wheezing.       amLODIPine (Norvasc) 5 mg tablet Take 1 tablet (5 mg) by mouth once daily. Do not start before October 18, 2023. 30 tablet 0     blood pressure monitor (Blood Pressure Kit) kit Check your blood pressure as indicted daily prior to and after medications. 1 kit 0 2/22/2024    cetirizine (ZyrTEC) 10 mg tablet Take 1 tablet (10 mg) by mouth once daily.   2/22/2024    cyclobenzaprine (Flexeril) 5 mg tablet Take 1 tablet (5 mg) by mouth 3 times a day for 10 days. 30 tablet 0 Unknown    DULoxetine (Cymbalta) 60 mg DR capsule Take 1 capsule (60 mg) by mouth once daily.   Unknown    fluticasone (Flonase) 50 mcg/actuation nasal spray Administer 1 spray into each nostril once daily. Shake gently. Before first use, prime pump. After use, clean tip and replace cap.       fluticasone propion-salmeteroL (Advair Diskus) 100-50 mcg/dose diskus inhaler Inhale 1 puff 2 times a day.   2/21/2024    losartan (Cozaar) 100 mg tablet Take 1 tablet (100 mg) by mouth once daily.   2/21/2024    magnesium oxide (Mag-Ox) 400 mg tablet Take 1 tablet (400 mg) by mouth once daily.       omeprazole (PriLOSEC) 20 mg DR capsule Take 1 capsule (20 mg) by mouth once daily in the  morning. Take before meals. Do not crush or chew.       ondansetron ODT (Zofran-ODT) 4 mg disintegrating tablet Take 1 tablet (4 mg) by mouth every 8 hours if needed for nausea or vomiting. 15 tablet 0 2/21/2024    potassium chloride CR 20 mEq ER tablet Take 1 tablet (20 mEq) by mouth once daily. Do not crush or chew.               Marlee Betancur, PharmD

## 2024-02-23 VITALS
HEIGHT: 66 IN | SYSTOLIC BLOOD PRESSURE: 138 MMHG | OXYGEN SATURATION: 97 % | TEMPERATURE: 97.9 F | RESPIRATION RATE: 18 BRPM | HEART RATE: 72 BPM | DIASTOLIC BLOOD PRESSURE: 92 MMHG | BODY MASS INDEX: 26.52 KG/M2 | WEIGHT: 165 LBS

## 2024-02-23 PROBLEM — Z72.0 NICOTINE USE: Chronic | Status: ACTIVE | Noted: 2023-10-17

## 2024-02-23 PROBLEM — E83.39 HYPOPHOSPHATEMIA: Chronic | Status: ACTIVE | Noted: 2024-02-22

## 2024-02-23 PROBLEM — E87.6 HYPOKALEMIA: Status: RESOLVED | Noted: 2023-10-16 | Resolved: 2024-02-23

## 2024-02-23 LAB
ANION GAP SERPL CALC-SCNC: 13 MMOL/L (ref 10–20)
BUN SERPL-MCNC: 4 MG/DL (ref 6–23)
CALCIUM SERPL-MCNC: 7.7 MG/DL (ref 8.6–10.3)
CHLORIDE SERPL-SCNC: 102 MMOL/L (ref 98–107)
CO2 SERPL-SCNC: 25 MMOL/L (ref 21–32)
CREAT SERPL-MCNC: 0.51 MG/DL (ref 0.5–1.05)
EGFRCR SERPLBLD CKD-EPI 2021: >90 ML/MIN/1.73M*2
ERYTHROCYTE [DISTWIDTH] IN BLOOD BY AUTOMATED COUNT: 12.8 % (ref 11.5–14.5)
GLUCOSE SERPL-MCNC: 111 MG/DL (ref 74–99)
HCT VFR BLD AUTO: 35.3 % (ref 36–46)
HGB BLD-MCNC: 11.9 G/DL (ref 12–16)
HOLD SPECIMEN: NORMAL
MAGNESIUM SERPL-MCNC: 1.3 MG/DL (ref 1.6–2.4)
MCH RBC QN AUTO: 34.4 PG (ref 26–34)
MCHC RBC AUTO-ENTMCNC: 33.7 G/DL (ref 32–36)
MCV RBC AUTO: 102 FL (ref 80–100)
NRBC BLD-RTO: 0 /100 WBCS (ref 0–0)
PLATELET # BLD AUTO: 279 X10*3/UL (ref 150–450)
POTASSIUM SERPL-SCNC: 3.4 MMOL/L (ref 3.5–5.3)
RBC # BLD AUTO: 3.46 X10*6/UL (ref 4–5.2)
SODIUM SERPL-SCNC: 137 MMOL/L (ref 136–145)
WBC # BLD AUTO: 4.7 X10*3/UL (ref 4.4–11.3)

## 2024-02-23 PROCEDURE — 83735 ASSAY OF MAGNESIUM: CPT | Performed by: NURSE PRACTITIONER

## 2024-02-23 PROCEDURE — 85027 COMPLETE CBC AUTOMATED: CPT | Performed by: NURSE PRACTITIONER

## 2024-02-23 PROCEDURE — 80048 BASIC METABOLIC PNL TOTAL CA: CPT | Performed by: NURSE PRACTITIONER

## 2024-02-23 PROCEDURE — 96376 TX/PRO/DX INJ SAME DRUG ADON: CPT

## 2024-02-23 PROCEDURE — 2500000004 HC RX 250 GENERAL PHARMACY W/ HCPCS (ALT 636 FOR OP/ED): Performed by: NURSE PRACTITIONER

## 2024-02-23 PROCEDURE — 2500000001 HC RX 250 WO HCPCS SELF ADMINISTERED DRUGS (ALT 637 FOR MEDICARE OP): Performed by: PHARMACIST

## 2024-02-23 PROCEDURE — 2500000001 HC RX 250 WO HCPCS SELF ADMINISTERED DRUGS (ALT 637 FOR MEDICARE OP): Performed by: PHYSICIAN ASSISTANT

## 2024-02-23 PROCEDURE — 2500000004 HC RX 250 GENERAL PHARMACY W/ HCPCS (ALT 636 FOR OP/ED): Performed by: PHYSICIAN ASSISTANT

## 2024-02-23 PROCEDURE — G0378 HOSPITAL OBSERVATION PER HR: HCPCS

## 2024-02-23 PROCEDURE — 2500000002 HC RX 250 W HCPCS SELF ADMINISTERED DRUGS (ALT 637 FOR MEDICARE OP, ALT 636 FOR OP/ED): Performed by: NURSE PRACTITIONER

## 2024-02-23 PROCEDURE — 96366 THER/PROPH/DIAG IV INF ADDON: CPT

## 2024-02-23 PROCEDURE — 99233 SBSQ HOSP IP/OBS HIGH 50: CPT | Performed by: NURSE PRACTITIONER

## 2024-02-23 RX ORDER — POTASSIUM CHLORIDE 20 MEQ/1
40 TABLET, EXTENDED RELEASE ORAL ONCE
Status: COMPLETED | OUTPATIENT
Start: 2024-02-23 | End: 2024-02-23

## 2024-02-23 RX ORDER — SPIRONOLACTONE 25 MG/1
25 TABLET ORAL DAILY
Qty: 30 TABLET | Refills: 0 | Status: SHIPPED | OUTPATIENT
Start: 2024-02-23 | End: 2024-03-24

## 2024-02-23 RX ORDER — MAGNESIUM SULFATE HEPTAHYDRATE 40 MG/ML
2 INJECTION, SOLUTION INTRAVENOUS ONCE
Status: COMPLETED | OUTPATIENT
Start: 2024-02-23 | End: 2024-02-23

## 2024-02-23 RX ORDER — CALCIUM CARBONATE 300MG(750)
400 TABLET,CHEWABLE ORAL 2 TIMES DAILY
Qty: 60 TABLET | Refills: 0 | Status: SHIPPED | OUTPATIENT
Start: 2024-02-23 | End: 2024-03-24

## 2024-02-23 RX ORDER — TRAMADOL HYDROCHLORIDE 50 MG/1
50 TABLET ORAL EVERY 6 HOURS PRN
Qty: 10 TABLET | Refills: 0 | Status: SHIPPED | OUTPATIENT
Start: 2024-02-23 | End: 2024-02-26

## 2024-02-23 RX ORDER — FAMOTIDINE 20 MG/1
20 TABLET, FILM COATED ORAL NIGHTLY
Qty: 30 TABLET | Refills: 0 | Status: SHIPPED | OUTPATIENT
Start: 2024-02-23 | End: 2024-03-24

## 2024-02-23 RX ADMIN — TRAMADOL HYDROCHLORIDE 50 MG: 50 TABLET, COATED ORAL at 11:54

## 2024-02-23 RX ADMIN — POTASSIUM CHLORIDE 40 MEQ: 1500 TABLET, EXTENDED RELEASE ORAL at 11:53

## 2024-02-23 RX ADMIN — PREGABALIN 50 MG: 25 CAPSULE ORAL at 09:23

## 2024-02-23 RX ADMIN — KETOROLAC TROMETHAMINE 15 MG: 30 INJECTION INTRAMUSCULAR; INTRAVENOUS at 09:26

## 2024-02-23 RX ADMIN — TRAMADOL HYDROCHLORIDE 50 MG: 50 TABLET, COATED ORAL at 04:48

## 2024-02-23 RX ADMIN — MAGNESIUM SULFATE HEPTAHYDRATE 2 G: 40 INJECTION, SOLUTION INTRAVENOUS at 09:23

## 2024-02-23 RX ADMIN — LOSARTAN POTASSIUM 100 MG: 50 TABLET, FILM COATED ORAL at 09:23

## 2024-02-23 RX ADMIN — DULOXETINE HYDROCHLORIDE 60 MG: 30 CAPSULE, DELAYED RELEASE ORAL at 09:23

## 2024-02-23 RX ADMIN — FAMOTIDINE 20 MG: 20 TABLET, FILM COATED ORAL at 09:23

## 2024-02-23 RX ADMIN — ACETAMINOPHEN 975 MG: 325 TABLET ORAL at 11:53

## 2024-02-23 ASSESSMENT — PAIN SCALES - GENERAL
PAINLEVEL_OUTOF10: 10 - WORST POSSIBLE PAIN
PAINLEVEL_OUTOF10: 6

## 2024-02-23 ASSESSMENT — PAIN - FUNCTIONAL ASSESSMENT
PAIN_FUNCTIONAL_ASSESSMENT: 0-10
PAIN_FUNCTIONAL_ASSESSMENT: 0-10

## 2024-02-23 ASSESSMENT — ACTIVITIES OF DAILY LIVING (ADL): LACK_OF_TRANSPORTATION: NO

## 2024-02-23 NOTE — DISCHARGE INSTRUCTIONS
Please follow with your PCP following discharge as hospital follow up. Follow up labs were ordered for further monitoring, please complete these within the next week. I seems you have been unfortunately facing these electrolyte abnormalities for awhile, which is why you were kept in the hospital. Your electrolytes were supplemented, We are increasing your magnesium from daily to BID. Please stop the omeprazole and take pepcid (famotidine) as it is less likely to contribute to low magnesium. Please resume your aldactone as this can help increase your potassium levels

## 2024-02-23 NOTE — PROGRESS NOTES
"Sharla Ramirez is a 43 y.o. female on day 0 of admission presenting with Electrolyte abnormality.    Subjective   Awake in bed, just endorses pain        Objective     Physical Exam  Constitutional:       Appearance: Normal appearance.   Cardiovascular:      Rate and Rhythm: Normal rate and regular rhythm.      Pulses: Normal pulses.      Heart sounds: Normal heart sounds. No murmur heard.     No gallop.   Pulmonary:      Effort: Pulmonary effort is normal. No respiratory distress.      Breath sounds: Normal breath sounds. No wheezing or rhonchi.   Abdominal:      General: Abdomen is flat. There is no distension.      Palpations: Abdomen is soft.      Tenderness: There is no abdominal tenderness. There is no guarding.   Musculoskeletal:         General: Normal range of motion.   Skin:     General: Skin is warm.      Capillary Refill: Capillary refill takes less than 2 seconds.   Neurological:      Mental Status: She is alert and oriented to person, place, and time.   Psychiatric:         Mood and Affect: Mood normal.         Thought Content: Thought content normal.         Judgment: Judgment normal.         Last Recorded Vitals  Blood pressure 140/80, pulse 80, temperature 36.6 °C (97.9 °F), temperature source Temporal, resp. rate 18, height 1.676 m (5' 6\"), weight 74.8 kg (165 lb), SpO2 100 %.  Intake/Output last 3 Shifts:  I/O last 3 completed shifts:  In: 180 (2.4 mL/kg) [P.O.:180]  Out: - (0 mL/kg)   Weight: 74.8 kg     Relevant Results  Scheduled medications  DULoxetine, 60 mg, oral, Daily  famotidine, 20 mg, oral, BID   Or  famotidine, 20 mg, intravenous, BID  losartan, 100 mg, oral, Daily  melatonin, 3 mg, oral, Daily  pregabalin, 50 mg, oral, TID  spironolactone, 25 mg, oral, Daily      Continuous medications     PRN medications  PRN medications: acetaminophen, docusate sodium, ketorolac, traMADol    Results for orders placed or performed during the hospital encounter of 02/21/24 (from the past 24 " hour(s))   DRUG SCREEN,URINE   Result Value Ref Range    Amphetamine Screen, Urine Presumptive Negative Presumptive Negative    Barbiturate Screen, Urine Presumptive Negative Presumptive Negative    Benzodiazepines Screen, Urine Presumptive Negative Presumptive Negative    Cannabinoid Screen, Urine Presumptive Negative Presumptive Negative    Cocaine Metabolite Screen, Urine Presumptive Negative Presumptive Negative    Fentanyl Screen, Urine Presumptive Negative Presumptive Negative    Opiate Screen, Urine Presumptive Negative Presumptive Negative    Oxycodone Screen, Urine Presumptive Negative Presumptive Negative    PCP Screen, Urine Presumptive Negative Presumptive Negative   CBC   Result Value Ref Range    WBC 4.0 (L) 4.4 - 11.3 x10*3/uL    nRBC 0.0 0.0 - 0.0 /100 WBCs    RBC 3.28 (L) 4.00 - 5.20 x10*6/uL    Hemoglobin 11.2 (L) 12.0 - 16.0 g/dL    Hematocrit 33.2 (L) 36.0 - 46.0 %     (H) 80 - 100 fL    MCH 34.1 (H) 26.0 - 34.0 pg    MCHC 33.7 32.0 - 36.0 g/dL    RDW 13.1 11.5 - 14.5 %    Platelets 302 150 - 450 x10*3/uL   Basic Metabolic Panel   Result Value Ref Range    Glucose 114 (H) 74 - 99 mg/dL    Sodium 137 136 - 145 mmol/L    Potassium 3.8 3.5 - 5.3 mmol/L    Chloride 103 98 - 107 mmol/L    Bicarbonate 25 21 - 32 mmol/L    Anion Gap 13 10 - 20 mmol/L    Urea Nitrogen 6 6 - 23 mg/dL    Creatinine 0.55 0.50 - 1.05 mg/dL    eGFR >90 >60 mL/min/1.73m*2    Calcium 8.2 (L) 8.6 - 10.3 mg/dL   Gamma-Glutamyl Transferase   Result Value Ref Range     (H) 5 - 55 U/L   Hepatic function panel   Result Value Ref Range    Albumin 3.5 3.4 - 5.0 g/dL    Bilirubin, Total 0.5 0.0 - 1.2 mg/dL    Bilirubin, Direct 0.1 0.0 - 0.3 mg/dL    Alkaline Phosphatase 105 33 - 110 U/L    ALT 35 7 - 45 U/L    AST 36 9 - 39 U/L    Total Protein 6.5 6.4 - 8.2 g/dL   Magnesium   Result Value Ref Range    Magnesium 1.30 (L) 1.60 - 2.40 mg/dL   Phosphorus   Result Value Ref Range    Phosphorus 3.2 2.5 - 4.9 mg/dL   CBC    Result Value Ref Range    WBC 4.5 4.4 - 11.3 x10*3/uL    nRBC 0.0 0.0 - 0.0 /100 WBCs    RBC 3.25 (L) 4.00 - 5.20 x10*6/uL    Hemoglobin 11.1 (L) 12.0 - 16.0 g/dL    Hematocrit 32.6 (L) 36.0 - 46.0 %     80 - 100 fL    MCH 34.2 (H) 26.0 - 34.0 pg    MCHC 34.0 32.0 - 36.0 g/dL    RDW 12.9 11.5 - 14.5 %    Platelets 300 150 - 450 x10*3/uL       ECG 12 lead    Result Date: 2/22/2024  Normal sinus rhythm Possible Left atrial enlargement Borderline ECG When compared with ECG of 19-FEB-2024 10:06, No significant change was found    ECG 12 lead    Result Date: 2/21/2024  Sinus tachycardia Otherwise normal ECG When compared with ECG of 13-JAN-2024 19:18, Nonspecific T wave abnormality no longer evident in Inferior leads See ED provider note for full interpretation and clinical correlation Confirmed by Olivia Loredo ([id number]) on 2/21/2024 10:36:52 AM    XR chest 2 views    Result Date: 2/19/2024  Interpreted By:  Arminda Thomas, STUDY: XR CHEST 2 VIEWS;  2/19/2024 9:48 am   INDICATION: Signs/Symptoms:SOB.   COMPARISON: 10/16/2023   ACCESSION NUMBER(S): [id number]   ORDERING CLINICIAN: SACHA BEDOYA   FINDINGS:         CARDIOMEDIASTINAL SILHOUETTE: Cardiomediastinal silhouette is normal in size and configuration.   LUNGS: Lungs are clear.   ABDOMEN: No remarkable upper abdominal findings.   BONES: No acute osseous changes.       1.  No evidence of acute cardiopulmonary process.       MACRO: None   Signed by: Arminda Thomas 2/19/2024 12:43 PM Dictation workstation:   [id number]                  Assessment/Plan   Principal Problem:    Electrolyte abnormality  Active Problems:    Essential hypertension    Mild persistent asthma    Hypomagnesemia    Nicotine use    Transaminitis    Hypophosphatemia    Sharla Ramirez is a 43 y.o. female with PMH significant for chronic electrolyte abnormalities (hypomagnesemia, hypokalemia) and chronic transaminitis who presented with c/o electrolyte imbalance.  Endorses cramping in her  legs and hands and feeling shaky. Reports these symptoms consistent when her electrolyte are low. She is on oral magnesium and potassium, and has been compliant with these medications.  Endorses chronic loose stools (3 daily) since having her gallbladder out, but denies changes in her bowel habits. Denies abdominal pain, fever, chills, nausea, vomiting, SOB, CP, palpitations, or syncope.  Endorses daily nicotine use.  Social ETOH on the weekends. Denies illicit substances.         Abn labs  -acute on chronic hypokalmeia and hypomagnesia   -K 3.0 on admission, received IV supplement in ED  -mag 0.8 on admission received IV supplement in ED   -repeat labs ordered   -not taking aldactone at home? Recommend resuming for hypokalemia   -labs ordered at 8:45 AM yesterday were not drawn until 9 pm last night. AM labs for this AM not drawn yet, this provider attempted to call lab to ensure they will get drawn but no answer x2  -Mag improved but still low at 1.3-> IV replacement ordered     DVT prophylaxis:  Lovenox, SCD    DC plan:  DC home when medically stable      Labs/Testing reviewed    Interdisciplinary team rounding completed with hospitalist, nurse, TCC    NP discussed plan and lab/testing results with Dr. Hernandez. DC pending on repeat labs.        I spent 50  minutes in the professional and overall care of this patient.            Chantelle Fernando, APRN-CNP

## 2024-02-23 NOTE — CARE PLAN
The patient's goals for the shift include      The clinical goals for the shift include display no adverse reactions to administration of electrolytes via peripheral IV. Resting on bed. Resp. Even and unlabored.

## 2024-02-23 NOTE — DISCHARGE SUMMARY
Sharla Ramirez is a 43 y.o. female on day 0 of admission presenting with Electrolyte abnormality.    Subjective   Awake in bed, just endorses pain        Objective      Your medication list        START taking these medications        Instructions Last Dose Given Next Dose Due   famotidine 20 mg tablet  Commonly known as: Pepcid      Take 1 tablet (20 mg) by mouth once daily at bedtime.              CHANGE how you take these medications        Instructions Last Dose Given Next Dose Due   magnesium oxide 400 mg tablet  Commonly known as: Mag-Ox  What changed: when to take this      Take 1 tablet (400 mg) by mouth 2 times a day.              CONTINUE taking these medications        Instructions Last Dose Given Next Dose Due   albuterol 90 mcg/actuation inhaler           amLODIPine 5 mg tablet  Commonly known as: Norvasc      Take 1 tablet (5 mg) by mouth once daily. Do not start before October 18, 2023.       blood pressure monitor kit  Commonly known as: Blood Pressure Kit      Check your blood pressure as indicted daily prior to and after medications.       cetirizine 10 mg tablet  Commonly known as: ZyrTEC           cyclobenzaprine 5 mg tablet  Commonly known as: Flexeril      Take 1 tablet (5 mg) by mouth 3 times a day for 10 days.       DULoxetine 60 mg DR capsule  Commonly known as: Cymbalta           fluticasone 50 mcg/actuation nasal spray  Commonly known as: Flonase           fluticasone propion-salmeteroL 100-50 mcg/dose diskus inhaler  Commonly known as: Advair Diskus           losartan 100 mg tablet  Commonly known as: Cozaar           ondansetron ODT 4 mg disintegrating tablet  Commonly known as: Zofran-ODT      Take 1 tablet (4 mg) by mouth every 8 hours if needed for nausea or vomiting.       potassium chloride CR 20 mEq ER tablet  Commonly known as: Klor-Con M20           spironolactone 25 mg tablet  Commonly known as: Aldactone      Take 1 tablet (25 mg) by mouth once daily.              STOP  "taking these medications      omeprazole 20 mg DR capsule  Commonly known as: PriLOSEC                  Where to Get Your Medications        These medications were sent to [facility]/pharmacy #[id number] - [facility] [place] - [address] [address] [facility] [address]  [address] [facility] [address]      Phone: [phone]   famotidine 20 mg tablet  magnesium oxide 400 mg tablet  spironolactone 25 mg tablet         Physical Exam  Constitutional:       Appearance: Normal appearance.   Cardiovascular:      Rate and Rhythm: Normal rate and regular rhythm.      Pulses: Normal pulses.      Heart sounds: Normal heart sounds. No murmur heard.     No gallop.   Pulmonary:      Effort: Pulmonary effort is normal. No respiratory distress.      Breath sounds: Normal breath sounds. No wheezing or rhonchi.   Abdominal:      General: Abdomen is flat. There is no distension.      Palpations: Abdomen is soft.      Tenderness: There is no abdominal tenderness. There is no guarding.   Musculoskeletal:         General: Normal range of motion.   Skin:     General: Skin is warm.      Capillary Refill: Capillary refill takes less than 2 seconds.   Neurological:      Mental Status: She is alert and oriented to person, place, and time.   Psychiatric:         Mood and Affect: Mood normal.         Thought Content: Thought content normal.         Judgment: Judgment normal.         Last Recorded Vitals  Blood pressure (!) 137/96, pulse 80, temperature 36.6 °C (97.9 °F), temperature source Temporal, resp. rate 18, height 1.676 m (5' 6\"), weight 74.8 kg (165 lb), SpO2 96 %.  Intake/Output last 3 Shifts:  I/O last 3 completed shifts:  In: 180 (2.4 mL/kg) [P.O.:180]  Out: - (0 mL/kg)   Weight: 74.8 kg     Relevant Results  Scheduled medications  DULoxetine, 60 mg, oral, Daily  famotidine, 20 mg, oral, BID   Or  famotidine, 20 mg, intravenous, BID  losartan, 100 mg, oral, Daily  melatonin, 3 mg, oral, Daily  potassium chloride CR, 40 mEq, oral, " Once  pregabalin, 50 mg, oral, TID  spironolactone, 25 mg, oral, Daily      Continuous medications     PRN medications  PRN medications: acetaminophen, docusate sodium, ketorolac, traMADol    Results for orders placed or performed during the hospital encounter of 02/21/24 (from the past 24 hour(s))   CBC   Result Value Ref Range    WBC 4.0 (L) 4.4 - 11.3 x10*3/uL    nRBC 0.0 0.0 - 0.0 /100 WBCs    RBC 3.28 (L) 4.00 - 5.20 x10*6/uL    Hemoglobin 11.2 (L) 12.0 - 16.0 g/dL    Hematocrit 33.2 (L) 36.0 - 46.0 %     (H) 80 - 100 fL    MCH 34.1 (H) 26.0 - 34.0 pg    MCHC 33.7 32.0 - 36.0 g/dL    RDW 13.1 11.5 - 14.5 %    Platelets 302 150 - 450 x10*3/uL   Basic Metabolic Panel   Result Value Ref Range    Glucose 114 (H) 74 - 99 mg/dL    Sodium 137 136 - 145 mmol/L    Potassium 3.8 3.5 - 5.3 mmol/L    Chloride 103 98 - 107 mmol/L    Bicarbonate 25 21 - 32 mmol/L    Anion Gap 13 10 - 20 mmol/L    Urea Nitrogen 6 6 - 23 mg/dL    Creatinine 0.55 0.50 - 1.05 mg/dL    eGFR >90 >60 mL/min/1.73m*2    Calcium 8.2 (L) 8.6 - 10.3 mg/dL   Gamma-Glutamyl Transferase   Result Value Ref Range     (H) 5 - 55 U/L   Hepatic function panel   Result Value Ref Range    Albumin 3.5 3.4 - 5.0 g/dL    Bilirubin, Total 0.5 0.0 - 1.2 mg/dL    Bilirubin, Direct 0.1 0.0 - 0.3 mg/dL    Alkaline Phosphatase 105 33 - 110 U/L    ALT 35 7 - 45 U/L    AST 36 9 - 39 U/L    Total Protein 6.5 6.4 - 8.2 g/dL   Magnesium   Result Value Ref Range    Magnesium 1.30 (L) 1.60 - 2.40 mg/dL   Phosphorus   Result Value Ref Range    Phosphorus 3.2 2.5 - 4.9 mg/dL   CBC   Result Value Ref Range    WBC 4.5 4.4 - 11.3 x10*3/uL    nRBC 0.0 0.0 - 0.0 /100 WBCs    RBC 3.25 (L) 4.00 - 5.20 x10*6/uL    Hemoglobin 11.1 (L) 12.0 - 16.0 g/dL    Hematocrit 32.6 (L) 36.0 - 46.0 %     80 - 100 fL    MCH 34.2 (H) 26.0 - 34.0 pg    MCHC 34.0 32.0 - 36.0 g/dL    RDW 12.9 11.5 - 14.5 %    Platelets 300 150 - 450 x10*3/uL   Magnesium   Result Value Ref Range     Magnesium 1.30 (L) 1.60 - 2.40 mg/dL   Lavender Top   Result Value Ref Range    Extra Tube Hold for add-ons.    CBC   Result Value Ref Range    WBC 4.7 4.4 - 11.3 x10*3/uL    nRBC 0.0 0.0 - 0.0 /100 WBCs    RBC 3.46 (L) 4.00 - 5.20 x10*6/uL    Hemoglobin 11.9 (L) 12.0 - 16.0 g/dL    Hematocrit 35.3 (L) 36.0 - 46.0 %     (H) 80 - 100 fL    MCH 34.4 (H) 26.0 - 34.0 pg    MCHC 33.7 32.0 - 36.0 g/dL    RDW 12.8 11.5 - 14.5 %    Platelets 279 150 - 450 x10*3/uL   Basic metabolic panel   Result Value Ref Range    Glucose 111 (H) 74 - 99 mg/dL    Sodium 137 136 - 145 mmol/L    Potassium 3.4 (L) 3.5 - 5.3 mmol/L    Chloride 102 98 - 107 mmol/L    Bicarbonate 25 21 - 32 mmol/L    Anion Gap 13 10 - 20 mmol/L    Urea Nitrogen 4 (L) 6 - 23 mg/dL    Creatinine 0.51 0.50 - 1.05 mg/dL    eGFR >90 >60 mL/min/1.73m*2    Calcium 7.7 (L) 8.6 - 10.3 mg/dL       ECG 12 lead    Result Date: 2/22/2024  Normal sinus rhythm Possible Left atrial enlargement Borderline ECG When compared with ECG of 19-FEB-2024 10:06, No significant change was found    ECG 12 lead    Result Date: 2/21/2024  Sinus tachycardia Otherwise normal ECG When compared with ECG of 13-JAN-2024 19:18, Nonspecific T wave abnormality no longer evident in Inferior leads See ED provider note for full interpretation and clinical correlation Confirmed by Olivia Loredo ([id number]) on 2/21/2024 10:36:52 AM    XR chest 2 views    Result Date: 2/19/2024  Interpreted By:  Arminda Thomas, STUDY: XR CHEST 2 VIEWS;  2/19/2024 9:48 am   INDICATION: Signs/Symptoms:SOB.   COMPARISON: 10/16/2023   ACCESSION NUMBER(S): [id number]   ORDERING CLINICIAN: SACHA BEDOYA   FINDINGS:         CARDIOMEDIASTINAL SILHOUETTE: Cardiomediastinal silhouette is normal in size and configuration.   LUNGS: Lungs are clear.   ABDOMEN: No remarkable upper abdominal findings.   BONES: No acute osseous changes.       1.  No evidence of acute cardiopulmonary process.       MACRO: None   Signed by: Arminda Thomas  2/19/2024 12:43 PM Dictation workstation:   [id number]                  Assessment/Plan   Principal Problem:    Electrolyte abnormality  Active Problems:    Essential hypertension    Mild persistent asthma    Hypomagnesemia    Nicotine use    Transaminitis    Hypophosphatemia    Sharla Ramirez is a 43 y.o. female with PMH significant for chronic electrolyte abnormalities (hypomagnesemia, hypokalemia) and chronic transaminitis who presented with c/o electrolyte imbalance.  Endorses cramping in her legs and hands and feeling shaky. Reports these symptoms consistent when her electrolyte are low. She is on oral magnesium and potassium, and has been compliant with these medications.  Endorses chronic loose stools (3 daily) since having her gallbladder out, but denies changes in her bowel habits. Denies abdominal pain, fever, chills, nausea, vomiting, SOB, CP, palpitations, or syncope.  Endorses daily nicotine use.  Social ETOH on the weekends. Denies illicit substances.         Abn labs  -acute on chronic hypokalmeia and hypomagnesia   -K 3.0 on admission, received IV supplement in ED  -mag 0.8 on admission received IV supplement in ED   -repeat labs ordered   -not taking aldactone at home? Recommend resuming for hypokalemia   -labs ordered at 8:45 AM yesterday were not drawn until 9 pm last night. AM labs for this AM not drawn yet, this provider attempted to call lab to ensure they will get drawn but no answer x2  -Mag improved but still low at 1.3-> IV replacement ordered   -K 3.4-supplement ordered  DVT prophylaxis:  Lovenox, SCD    DC plan:  DC home when medically stable      Labs/Testing reviewed    Interdisciplinary team rounding completed with hospitalist, nurse, TCC    NP discussed plan and lab/testing results with Dr. Hernandez.  DC after mag and K given      I spent 50  minutes in the professional and overall care of this patient.            Chantelle Fernando, APRN-CNP

## 2024-02-24 LAB
ATRIAL RATE: 83 BPM
P AXIS: 60 DEGREES
P OFFSET: 199 MS
P ONSET: 147 MS
PR INTERVAL: 148 MS
Q ONSET: 221 MS
QRS COUNT: 14 BEATS
QRS DURATION: 84 MS
QT INTERVAL: 400 MS
QTC CALCULATION(BAZETT): 470 MS
QTC FREDERICIA: 446 MS
R AXIS: 0 DEGREES
T AXIS: 45 DEGREES
T OFFSET: 421 MS
VENTRICULAR RATE: 83 BPM

## 2024-03-05 NOTE — PROGRESS NOTES
Pt no showed appointment today with endocrinology. 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 Hypertension, hypokalemia and hypoaldosteronism with suppressed renin: a clinical study of a patient with Liddle's syndrome. Endocrinol Jpn. 1981 Jovanni;28(3):357-62. doi: 10.1507/brugffx0808.28.357. PMID: 0515718) . I did make referral to nephrology after attempting to reach PCP but was unable to route message as she is not apart of  system. I did leave VM with patient  letting  her know that she will need to make an appointment with them.      Case discussed with Dr. Jerez

## 2024-05-07 ENCOUNTER — HOSPITAL ENCOUNTER (INPATIENT)
Facility: HOSPITAL | Age: 44
LOS: 2 days | Discharge: HOME HEALTH | DRG: 641 | End: 2024-05-10
Attending: EMERGENCY MEDICINE | Admitting: INTERNAL MEDICINE
Payer: COMMERCIAL

## 2024-05-07 ENCOUNTER — CLINICAL SUPPORT (OUTPATIENT)
Dept: EMERGENCY MEDICINE | Facility: HOSPITAL | Age: 44
DRG: 641 | End: 2024-05-07
Payer: COMMERCIAL

## 2024-05-07 DIAGNOSIS — E83.39 HYPOPHOSPHATEMIA: ICD-10-CM

## 2024-05-07 DIAGNOSIS — K90.89 BILE SALT-INDUCED DIARRHEA (HHS-HCC): ICD-10-CM

## 2024-05-07 DIAGNOSIS — J45.30 MILD PERSISTENT ASTHMA WITHOUT COMPLICATION (HHS-HCC): Chronic | ICD-10-CM

## 2024-05-07 DIAGNOSIS — E83.42 HYPOMAGNESEMIA: ICD-10-CM

## 2024-05-07 DIAGNOSIS — S84.92XD NEURAPRAXIA OF LEFT LOWER EXTREMITY, SUBSEQUENT ENCOUNTER: Chronic | ICD-10-CM

## 2024-05-07 DIAGNOSIS — E87.6 HYPOKALEMIA: Primary | ICD-10-CM

## 2024-05-07 DIAGNOSIS — I10 ESSENTIAL HYPERTENSION: Chronic | ICD-10-CM

## 2024-05-07 DIAGNOSIS — R11.2 NAUSEA AND VOMITING, UNSPECIFIED VOMITING TYPE: ICD-10-CM

## 2024-05-07 DIAGNOSIS — E87.8 ELECTROLYTE ABNORMALITY: ICD-10-CM

## 2024-05-07 LAB
ALBUMIN SERPL BCP-MCNC: 3.9 G/DL (ref 3.4–5)
ALP SERPL-CCNC: 108 U/L (ref 33–110)
ALT SERPL W P-5'-P-CCNC: 24 U/L (ref 7–45)
ANION GAP SERPL CALC-SCNC: 22 MMOL/L (ref 10–20)
AST SERPL W P-5'-P-CCNC: 50 U/L (ref 9–39)
BASOPHILS # BLD AUTO: 0.02 X10*3/UL (ref 0–0.1)
BASOPHILS NFR BLD AUTO: 0.3 %
BILIRUB SERPL-MCNC: 0.8 MG/DL (ref 0–1.2)
BUN SERPL-MCNC: 7 MG/DL (ref 6–23)
CALCIUM SERPL-MCNC: 8.6 MG/DL (ref 8.6–10.6)
CHLORIDE SERPL-SCNC: 100 MMOL/L (ref 98–107)
CO2 SERPL-SCNC: 20 MMOL/L (ref 21–32)
CREAT SERPL-MCNC: 0.52 MG/DL (ref 0.5–1.05)
EGFRCR SERPLBLD CKD-EPI 2021: >90 ML/MIN/1.73M*2
EOSINOPHIL # BLD AUTO: 0.01 X10*3/UL (ref 0–0.7)
EOSINOPHIL NFR BLD AUTO: 0.2 %
ERYTHROCYTE [DISTWIDTH] IN BLOOD BY AUTOMATED COUNT: 13.1 % (ref 11.5–14.5)
GLUCOSE SERPL-MCNC: 100 MG/DL (ref 74–99)
HCT VFR BLD AUTO: 32.3 % (ref 36–46)
HGB BLD-MCNC: 11.9 G/DL (ref 12–16)
HOLD SPECIMEN: NORMAL
IMM GRANULOCYTES # BLD AUTO: 0.01 X10*3/UL (ref 0–0.7)
IMM GRANULOCYTES NFR BLD AUTO: 0.2 % (ref 0–0.9)
LYMPHOCYTES # BLD AUTO: 2.1 X10*3/UL (ref 1.2–4.8)
LYMPHOCYTES NFR BLD AUTO: 36.6 %
MAGNESIUM SERPL-MCNC: 1.36 MG/DL (ref 1.6–2.4)
MCH RBC QN AUTO: 33.1 PG (ref 26–34)
MCHC RBC AUTO-ENTMCNC: 36.8 G/DL (ref 32–36)
MCV RBC AUTO: 90 FL (ref 80–100)
MONOCYTES # BLD AUTO: 0.45 X10*3/UL (ref 0.1–1)
MONOCYTES NFR BLD AUTO: 7.9 %
NEUTROPHILS # BLD AUTO: 3.14 X10*3/UL (ref 1.2–7.7)
NEUTROPHILS NFR BLD AUTO: 54.8 %
NRBC BLD-RTO: 0 /100 WBCS (ref 0–0)
PHOSPHATE SERPL-MCNC: 1.6 MG/DL (ref 2.5–4.9)
PLATELET # BLD AUTO: 315 X10*3/UL (ref 150–450)
POTASSIUM SERPL-SCNC: 2.6 MMOL/L (ref 3.5–5.3)
PREGNANCY TEST URINE, POC: NEGATIVE
PROT SERPL-MCNC: 7.3 G/DL (ref 6.4–8.2)
RBC # BLD AUTO: 3.6 X10*6/UL (ref 4–5.2)
SODIUM SERPL-SCNC: 139 MMOL/L (ref 136–145)
WBC # BLD AUTO: 5.7 X10*3/UL (ref 4.4–11.3)

## 2024-05-07 PROCEDURE — 2500000004 HC RX 250 GENERAL PHARMACY W/ HCPCS (ALT 636 FOR OP/ED)

## 2024-05-07 PROCEDURE — 99285 EMERGENCY DEPT VISIT HI MDM: CPT | Performed by: EMERGENCY MEDICINE

## 2024-05-07 PROCEDURE — 93005 ELECTROCARDIOGRAM TRACING: CPT

## 2024-05-07 PROCEDURE — 96365 THER/PROPH/DIAG IV INF INIT: CPT

## 2024-05-07 PROCEDURE — 96366 THER/PROPH/DIAG IV INF ADDON: CPT

## 2024-05-07 PROCEDURE — 2500000004 HC RX 250 GENERAL PHARMACY W/ HCPCS (ALT 636 FOR OP/ED): Performed by: EMERGENCY MEDICINE

## 2024-05-07 PROCEDURE — 99285 EMERGENCY DEPT VISIT HI MDM: CPT | Mod: 25

## 2024-05-07 PROCEDURE — 93010 ELECTROCARDIOGRAM REPORT: CPT | Performed by: EMERGENCY MEDICINE

## 2024-05-07 PROCEDURE — 80053 COMPREHEN METABOLIC PANEL: CPT

## 2024-05-07 PROCEDURE — 36415 COLL VENOUS BLD VENIPUNCTURE: CPT | Performed by: EMERGENCY MEDICINE

## 2024-05-07 PROCEDURE — 85025 COMPLETE CBC W/AUTO DIFF WBC: CPT

## 2024-05-07 PROCEDURE — 96367 TX/PROPH/DG ADDL SEQ IV INF: CPT

## 2024-05-07 PROCEDURE — 2500000001 HC RX 250 WO HCPCS SELF ADMINISTERED DRUGS (ALT 637 FOR MEDICARE OP): Performed by: EMERGENCY MEDICINE

## 2024-05-07 PROCEDURE — 96375 TX/PRO/DX INJ NEW DRUG ADDON: CPT

## 2024-05-07 PROCEDURE — 84100 ASSAY OF PHOSPHORUS: CPT

## 2024-05-07 PROCEDURE — 2500000005 HC RX 250 GENERAL PHARMACY W/O HCPCS

## 2024-05-07 PROCEDURE — 83735 ASSAY OF MAGNESIUM: CPT

## 2024-05-07 PROCEDURE — 81025 URINE PREGNANCY TEST: CPT | Performed by: EMERGENCY MEDICINE

## 2024-05-07 RX ORDER — HYDROMORPHONE HYDROCHLORIDE 1 MG/ML
INJECTION, SOLUTION INTRAMUSCULAR; INTRAVENOUS; SUBCUTANEOUS
Status: COMPLETED
Start: 2024-05-07 | End: 2024-05-07

## 2024-05-07 RX ORDER — MAGNESIUM SULFATE HEPTAHYDRATE 40 MG/ML
2 INJECTION, SOLUTION INTRAVENOUS ONCE
Status: COMPLETED | OUTPATIENT
Start: 2024-05-07 | End: 2024-05-07

## 2024-05-07 RX ORDER — HYDROXYZINE HYDROCHLORIDE 25 MG/1
25 TABLET, FILM COATED ORAL ONCE
Status: COMPLETED | OUTPATIENT
Start: 2024-05-07 | End: 2024-05-07

## 2024-05-07 RX ORDER — ONDANSETRON HYDROCHLORIDE 2 MG/ML
4 INJECTION, SOLUTION INTRAVENOUS ONCE
Status: COMPLETED | OUTPATIENT
Start: 2024-05-07 | End: 2024-05-07

## 2024-05-07 RX ORDER — POTASSIUM CHLORIDE 14.9 MG/ML
20 INJECTION INTRAVENOUS
Status: COMPLETED | OUTPATIENT
Start: 2024-05-07 | End: 2024-05-08

## 2024-05-07 RX ORDER — HYDROXYZINE HYDROCHLORIDE 10 MG/1
10 TABLET, FILM COATED ORAL ONCE
Status: DISCONTINUED | OUTPATIENT
Start: 2024-05-07 | End: 2024-05-07

## 2024-05-07 RX ORDER — HYDROMORPHONE HYDROCHLORIDE 1 MG/ML
1 INJECTION, SOLUTION INTRAMUSCULAR; INTRAVENOUS; SUBCUTANEOUS ONCE
Status: DISCONTINUED | OUTPATIENT
Start: 2024-05-07 | End: 2024-05-08

## 2024-05-07 RX ORDER — HYDROMORPHONE HYDROCHLORIDE 1 MG/ML
0.6 INJECTION, SOLUTION INTRAMUSCULAR; INTRAVENOUS; SUBCUTANEOUS ONCE
Status: DISCONTINUED | OUTPATIENT
Start: 2024-05-07 | End: 2024-05-07

## 2024-05-07 RX ADMIN — ONDANSETRON 4 MG: 2 INJECTION INTRAMUSCULAR; INTRAVENOUS at 19:55

## 2024-05-07 RX ADMIN — HYDROXYZINE HYDROCHLORIDE 25 MG: 25 TABLET, FILM COATED ORAL at 21:58

## 2024-05-07 RX ADMIN — HYDROMORPHONE HYDROCHLORIDE 1 MG: 1 INJECTION, SOLUTION INTRAMUSCULAR; INTRAVENOUS; SUBCUTANEOUS at 20:03

## 2024-05-07 RX ADMIN — POTASSIUM CHLORIDE 20 MEQ: 14.9 INJECTION, SOLUTION INTRAVENOUS at 19:56

## 2024-05-07 RX ADMIN — MAGNESIUM SULFATE HEPTAHYDRATE 2 G: 40 INJECTION, SOLUTION INTRAVENOUS at 19:55

## 2024-05-07 RX ADMIN — POTASSIUM PHOSPHATE, MONOBASIC POTASSIUM PHOSPHATE, DIBASIC 21 MMOL: 224; 236 INJECTION, SOLUTION, CONCENTRATE INTRAVENOUS at 21:27

## 2024-05-07 RX ADMIN — POTASSIUM CHLORIDE 20 MEQ: 14.9 INJECTION, SOLUTION INTRAVENOUS at 21:37

## 2024-05-07 ASSESSMENT — LIFESTYLE VARIABLES
HAVE YOU EVER FELT YOU SHOULD CUT DOWN ON YOUR DRINKING: NO
EVER FELT BAD OR GUILTY ABOUT YOUR DRINKING: NO
TOTAL SCORE: 0
EVER HAD A DRINK FIRST THING IN THE MORNING TO STEADY YOUR NERVES TO GET RID OF A HANGOVER: NO
HAVE PEOPLE ANNOYED YOU BY CRITICIZING YOUR DRINKING: NO

## 2024-05-07 ASSESSMENT — PAIN - FUNCTIONAL ASSESSMENT
PAIN_FUNCTIONAL_ASSESSMENT: 0-10
PAIN_FUNCTIONAL_ASSESSMENT: 0-10

## 2024-05-07 ASSESSMENT — PAIN SCALES - GENERAL
PAINLEVEL_OUTOF10: 10 - WORST POSSIBLE PAIN
PAINLEVEL_OUTOF10: 0 - NO PAIN

## 2024-05-07 ASSESSMENT — PAIN DESCRIPTION - PROGRESSION: CLINICAL_PROGRESSION: NOT CHANGED

## 2024-05-07 NOTE — ED TRIAGE NOTES
"Pt to ED via EMS with c/c tremors. Hx ankle injury on monthly ketamine infusions. Says hx hypokalemia and hypomagnesemia. Says she had infusion today. Since then generalized tremors and \"body locking up.\"   "

## 2024-05-07 NOTE — Clinical Note
Sharla Ramirez was seen and treated in our emergency department on 5/7/2024.  She may return to work on 05/09/2024.       If you have any questions or concerns, please don't hesitate to call.      Laly Hsu MD

## 2024-05-07 NOTE — ED PROVIDER NOTES
HPI   Chief Complaint   Patient presents with    Tremors       HPI  Patient is a 43-year-old female with a past medical history significant for hypokalemia, hypomagnesemia chronically with unknown etiology currently on oral supplementation, asthma, anxiety, CRPS type I of LLE, hypertension who presented to the emergency room due to concern for electrolyte imbalance. Patient states she has symptoms similar to prior episodes of low electrolytes (Mg and K) including chills, diffuse aches and tremors, tachycardia, and shortness of breath. Patient states she received ketamine infusion today for CRPS of her L lower extremity and developed nausea with 2 episodes of emesis shortly after. Reports chronic diarrhea (3 watery BMs per day) since cholecystectomy. Patient denies fever, headache, vision changes, chest pain, palpitations. Of note patient was referred to outpatient endocrinology, but did not make appointment. Per endocrine note 3/5, suspect possible Liddle's syndrome (HTN, low ayush, low renin, high bicarb, low K) and recommended nephrology referral. Patient has not met with nephrology outpatient.     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  Social History     Tobacco Use    Smoking status: Every Day     Types: Cigarettes    Smokeless tobacco: Never   Substance Use Topics    Alcohol use: Yes     Comment: sociall on weekends    Drug use: Not Currently       Physical Exam   ED Triage Vitals [05/07/24 1803]   Temperature Heart Rate Respirations BP   36.2 °C (97.2 °F) 98 16 (!) 153/100      Pulse Ox Temp Source Heart Rate Source Patient Position   99 % Temporal -- --      BP Location FiO2 (%)     -- --       Physical  Exam  Constitutional: Awake and alert.  No acute distress. Patient appears stated age.  Head and face:  Normocephalic, atraumatic. Hearing is grossly intact. Moist mucus membranes.   Eyes: Sclera non-icteric and eye lids are without obvious rash or drooping. PERRL. EOMI  Neck:  Tracheal position is midline.   Pulmonary: CTAB. No crackles or wheezes. No gasping or shortness of breath noted. Normal respiratory movements without use of accessory muscles.   Cardiovascular: RRR. Normal S1 and S2. No murmurs, rubs, or gallops.   GI: abd soft, non-tender, non-distended, +BS  Skin: No rashes or open lesions/ulcers identified on skin.  Extremities: No peripheral edema present. Moves all 4 extremities spontaneously. Shaking/shivering movements noted through bilateral upper and lower extremities.   Psychiatric: Mood and affect are normal.     ED Course & MDM   ED Course as of 05/08/24 1755   Tue May 07, 2024   1848 EKG independently interpreted by me. Sinus tachycardia. Rate of 100. Qtc 459. Normal axis. Poor baseline due to tremors, but no acute ST or T wave changes noted.  [YH]   1918 K 2.6, Mg 1.36, Phos 1.6. Repletion ordered.  [YH]      ED Course User Index  [YH] Sandra Antonio MD         Diagnoses as of 05/08/24 1755   Hypokalemia   Hypophosphatemia   Hypomagnesemia   Nausea and vomiting, unspecified vomiting type       Medical Decision Making  Patient presented to the ED with concern for low electrolytes. Vitals stable. Patient with diffuse tremors of bilateral upper and lower extremities, otherwise unremarkable.  Suspect acute exacerbation of chronic electrolyte abnormalities in the setting of increased GI losses. EKG showing sinus tachycardia. Results were reviewed and significant for K 2.6, Mg 1.36, Phos 1.6. Patient was treated with repletion of potassium, magnesium, and phosphorous. Case was reviewed with the attending physician, who agrees with the plan. Patient and/or patient's representatives were counseled  regarding labs, imaging, likely diagnosis, and plan. All questions were answered. Pending r/p labs. Will need nephrology referral at d/c.    Impression: acute on chronic electrolyte imbalance    Disposition: Patient signed out to oncoming provider at 0100. Final dispo pending r/p labs. Will need nephrology referral at d/c.     Procedure  Procedures     Sandra Antonio MD  Resident  05/08/24 0107       Sandra Antonio MD  Resident  05/08/24 2790

## 2024-05-08 ENCOUNTER — APPOINTMENT (OUTPATIENT)
Dept: RADIOLOGY | Facility: HOSPITAL | Age: 44
DRG: 641 | End: 2024-05-08
Payer: COMMERCIAL

## 2024-05-08 PROBLEM — E87.6 HYPOKALEMIA: Status: ACTIVE | Noted: 2024-05-08

## 2024-05-08 LAB
ALBUMIN SERPL BCP-MCNC: 4 G/DL (ref 3.4–5)
ANION GAP BLDV CALCULATED.4IONS-SCNC: 12 MMOL/L (ref 10–25)
ANION GAP SERPL CALC-SCNC: 14 MMOL/L (ref 10–20)
ATRIAL RATE: 100 BPM
BASE EXCESS BLDV CALC-SCNC: -1.2 MMOL/L (ref -2–3)
BODY TEMPERATURE: 37 DEGREES CELSIUS
BUN SERPL-MCNC: 2 MG/DL (ref 6–23)
CA-I BLDV-SCNC: 1.16 MMOL/L (ref 1.1–1.33)
CALCIUM SERPL-MCNC: 8.6 MG/DL (ref 8.6–10.6)
CHLORIDE BLDV-SCNC: 104 MMOL/L (ref 98–107)
CHLORIDE SERPL-SCNC: 105 MMOL/L (ref 98–107)
CHLORIDE UR-SCNC: 140 MMOL/L
CHLORIDE/CREATININE (MMOL/G) IN URINE: 111 MMOL/G CREAT (ref 38–318)
CO2 SERPL-SCNC: 22 MMOL/L (ref 21–32)
CREAT SERPL-MCNC: 0.46 MG/DL (ref 0.5–1.05)
CREAT UR-MCNC: 125.9 MG/DL (ref 20–320)
CREAT UR-MCNC: 125.9 MG/DL (ref 20–320)
CREAT UR-MCNC: 127.4 MG/DL (ref 20–320)
EGFRCR SERPLBLD CKD-EPI 2021: >90 ML/MIN/1.73M*2
GLUCOSE BLDV-MCNC: 117 MG/DL (ref 74–99)
GLUCOSE SERPL-MCNC: 110 MG/DL (ref 74–99)
HCO3 BLDV-SCNC: 22.6 MMOL/L (ref 22–26)
HCT VFR BLD EST: 36 % (ref 36–46)
HGB BLDV-MCNC: 12.1 G/DL (ref 12–16)
INHALED O2 CONCENTRATION: 21 %
LACTATE BLDV-SCNC: 1.6 MMOL/L (ref 0.4–2)
MAGNESIUM SERPL-MCNC: 1.64 MG/DL (ref 1.6–2.4)
MAGNESIUM SERPL-MCNC: 2.36 MG/DL (ref 1.6–2.4)
MAGNESIUM SERPL-MCNC: 2.52 MG/DL (ref 1.6–2.4)
OXYHGB MFR BLDV: 95.6 % (ref 45–75)
P AXIS: 50 DEGREES
P OFFSET: 203 MS
P ONSET: 155 MS
PCO2 BLDV: 34 MM HG (ref 41–51)
PH BLDV: 7.43 PH (ref 7.33–7.43)
PHOSPHATE SERPL-MCNC: 2 MG/DL (ref 2.5–4.9)
PHOSPHATE SERPL-MCNC: 2.8 MG/DL (ref 2.5–4.9)
PHOSPHATE UR-MCNC: 11 MG/DL
PHOSPHORUS/CREATININE (MG/G) RATIO IN URINE: 87 MG/G CREAT
PO2 BLDV: 83 MM HG (ref 35–45)
POTASSIUM BLDV-SCNC: 3.8 MMOL/L (ref 3.5–5.3)
POTASSIUM SERPL-SCNC: 2.9 MMOL/L (ref 3.5–5.3)
POTASSIUM SERPL-SCNC: 3.7 MMOL/L (ref 3.5–5.3)
POTASSIUM UR-SCNC: 34 MMOL/L
POTASSIUM/CREAT UR-RTO: 27 MMOL/G CREAT
PR INTERVAL: 134 MS
Q ONSET: 222 MS
QRS COUNT: 16 BEATS
QRS DURATION: 76 MS
QT INTERVAL: 356 MS
QTC CALCULATION(BAZETT): 459 MS
QTC FREDERICIA: 422 MS
R AXIS: -2 DEGREES
SAO2 % BLDV: 98 % (ref 45–75)
SODIUM BLDV-SCNC: 135 MMOL/L (ref 136–145)
SODIUM SERPL-SCNC: 137 MMOL/L (ref 136–145)
SODIUM UR-SCNC: 133 MMOL/L
SODIUM/CREAT UR-RTO: 106 MMOL/G CREAT
T AXIS: 16 DEGREES
T OFFSET: 400 MS
VENTRICULAR RATE: 100 BPM

## 2024-05-08 PROCEDURE — 2500000002 HC RX 250 W HCPCS SELF ADMINISTERED DRUGS (ALT 637 FOR MEDICARE OP, ALT 636 FOR OP/ED): Performed by: STUDENT IN AN ORGANIZED HEALTH CARE EDUCATION/TRAINING PROGRAM

## 2024-05-08 PROCEDURE — 84105 ASSAY OF URINE PHOSPHORUS: CPT | Performed by: STUDENT IN AN ORGANIZED HEALTH CARE EDUCATION/TRAINING PROGRAM

## 2024-05-08 PROCEDURE — 83735 ASSAY OF MAGNESIUM: CPT | Performed by: STUDENT IN AN ORGANIZED HEALTH CARE EDUCATION/TRAINING PROGRAM

## 2024-05-08 PROCEDURE — 84133 ASSAY OF URINE POTASSIUM: CPT | Performed by: STUDENT IN AN ORGANIZED HEALTH CARE EDUCATION/TRAINING PROGRAM

## 2024-05-08 PROCEDURE — 83735 ASSAY OF MAGNESIUM: CPT

## 2024-05-08 PROCEDURE — 74018 RADEX ABDOMEN 1 VIEW: CPT

## 2024-05-08 PROCEDURE — 96366 THER/PROPH/DIAG IV INF ADDON: CPT

## 2024-05-08 PROCEDURE — 1210000001 HC SEMI-PRIVATE ROOM DAILY

## 2024-05-08 PROCEDURE — 2500000006 HC RX 250 W HCPCS SELF ADMINISTERED DRUGS (ALT 637 FOR ALL PAYERS)

## 2024-05-08 PROCEDURE — 2500000006 HC RX 250 W HCPCS SELF ADMINISTERED DRUGS (ALT 637 FOR ALL PAYERS): Performed by: STUDENT IN AN ORGANIZED HEALTH CARE EDUCATION/TRAINING PROGRAM

## 2024-05-08 PROCEDURE — 83735 ASSAY OF MAGNESIUM: CPT | Performed by: INTERNAL MEDICINE

## 2024-05-08 PROCEDURE — 84100 ASSAY OF PHOSPHORUS: CPT

## 2024-05-08 PROCEDURE — 82570 ASSAY OF URINE CREATININE: CPT | Performed by: STUDENT IN AN ORGANIZED HEALTH CARE EDUCATION/TRAINING PROGRAM

## 2024-05-08 PROCEDURE — 96375 TX/PRO/DX INJ NEW DRUG ADDON: CPT

## 2024-05-08 PROCEDURE — 80069 RENAL FUNCTION PANEL: CPT | Performed by: STUDENT IN AN ORGANIZED HEALTH CARE EDUCATION/TRAINING PROGRAM

## 2024-05-08 PROCEDURE — 36415 COLL VENOUS BLD VENIPUNCTURE: CPT | Performed by: STUDENT IN AN ORGANIZED HEALTH CARE EDUCATION/TRAINING PROGRAM

## 2024-05-08 PROCEDURE — 2500000006 HC RX 250 W HCPCS SELF ADMINISTERED DRUGS (ALT 637 FOR ALL PAYERS): Performed by: EMERGENCY MEDICINE

## 2024-05-08 PROCEDURE — 74018 RADEX ABDOMEN 1 VIEW: CPT | Performed by: RADIOLOGY

## 2024-05-08 PROCEDURE — 82436 ASSAY OF URINE CHLORIDE: CPT | Performed by: STUDENT IN AN ORGANIZED HEALTH CARE EDUCATION/TRAINING PROGRAM

## 2024-05-08 PROCEDURE — 80377 DRUG/SUBSTANCE NOS 7/MORE: CPT | Performed by: INTERNAL MEDICINE

## 2024-05-08 PROCEDURE — 2500000004 HC RX 250 GENERAL PHARMACY W/ HCPCS (ALT 636 FOR OP/ED): Performed by: STUDENT IN AN ORGANIZED HEALTH CARE EDUCATION/TRAINING PROGRAM

## 2024-05-08 PROCEDURE — 2500000004 HC RX 250 GENERAL PHARMACY W/ HCPCS (ALT 636 FOR OP/ED)

## 2024-05-08 PROCEDURE — 84132 ASSAY OF SERUM POTASSIUM: CPT | Performed by: STUDENT IN AN ORGANIZED HEALTH CARE EDUCATION/TRAINING PROGRAM

## 2024-05-08 PROCEDURE — 84100 ASSAY OF PHOSPHORUS: CPT | Performed by: STUDENT IN AN ORGANIZED HEALTH CARE EDUCATION/TRAINING PROGRAM

## 2024-05-08 PROCEDURE — 99223 1ST HOSP IP/OBS HIGH 75: CPT | Performed by: INTERNAL MEDICINE

## 2024-05-08 PROCEDURE — 84132 ASSAY OF SERUM POTASSIUM: CPT

## 2024-05-08 PROCEDURE — 2500000001 HC RX 250 WO HCPCS SELF ADMINISTERED DRUGS (ALT 637 FOR MEDICARE OP): Performed by: STUDENT IN AN ORGANIZED HEALTH CARE EDUCATION/TRAINING PROGRAM

## 2024-05-08 PROCEDURE — 36415 COLL VENOUS BLD VENIPUNCTURE: CPT

## 2024-05-08 RX ORDER — MAGNESIUM SULFATE HEPTAHYDRATE 40 MG/ML
2 INJECTION, SOLUTION INTRAVENOUS ONCE
Status: COMPLETED | OUTPATIENT
Start: 2024-05-08 | End: 2024-05-08

## 2024-05-08 RX ORDER — OXYCODONE HYDROCHLORIDE 5 MG/1
5 TABLET ORAL EVERY 6 HOURS PRN
Status: DISCONTINUED | OUTPATIENT
Start: 2024-05-08 | End: 2024-05-10 | Stop reason: HOSPADM

## 2024-05-08 RX ORDER — ONDANSETRON 4 MG/1
4 TABLET, ORALLY DISINTEGRATING ORAL EVERY 8 HOURS PRN
Qty: 27 TABLET | Refills: 0 | Status: SHIPPED | OUTPATIENT
Start: 2024-05-08 | End: 2024-05-10 | Stop reason: HOSPADM

## 2024-05-08 RX ORDER — ONDANSETRON 4 MG/1
4 TABLET, ORALLY DISINTEGRATING ORAL EVERY 6 HOURS PRN
Status: DISCONTINUED | OUTPATIENT
Start: 2024-05-08 | End: 2024-05-10 | Stop reason: HOSPADM

## 2024-05-08 RX ORDER — FLUTICASONE PROPIONATE 50 MCG
1 SPRAY, SUSPENSION (ML) NASAL DAILY
Status: DISCONTINUED | OUTPATIENT
Start: 2024-05-08 | End: 2024-05-10 | Stop reason: HOSPADM

## 2024-05-08 RX ORDER — ACETAMINOPHEN 325 MG/1
650 TABLET ORAL EVERY 6 HOURS
Status: DISCONTINUED | OUTPATIENT
Start: 2024-05-08 | End: 2024-05-10 | Stop reason: HOSPADM

## 2024-05-08 RX ORDER — TIZANIDINE 4 MG/1
4 TABLET ORAL EVERY 6 HOURS PRN
Status: DISCONTINUED | OUTPATIENT
Start: 2024-05-08 | End: 2024-05-10 | Stop reason: HOSPADM

## 2024-05-08 RX ORDER — LOSARTAN POTASSIUM 100 MG/1
100 TABLET ORAL DAILY
Status: DISCONTINUED | OUTPATIENT
Start: 2024-05-08 | End: 2024-05-10 | Stop reason: HOSPADM

## 2024-05-08 RX ORDER — FAMOTIDINE 20 MG/1
20 TABLET, FILM COATED ORAL NIGHTLY
Status: DISCONTINUED | OUTPATIENT
Start: 2024-05-08 | End: 2024-05-10 | Stop reason: HOSPADM

## 2024-05-08 RX ORDER — POTASSIUM CHLORIDE 20 MEQ/1
20 TABLET, EXTENDED RELEASE ORAL DAILY
Status: DISCONTINUED | OUTPATIENT
Start: 2024-05-08 | End: 2024-05-09

## 2024-05-08 RX ORDER — LORATADINE 10 MG/1
10 TABLET ORAL DAILY
Status: DISCONTINUED | OUTPATIENT
Start: 2024-05-08 | End: 2024-05-10 | Stop reason: HOSPADM

## 2024-05-08 RX ORDER — LANOLIN ALCOHOL/MO/W.PET/CERES
400 CREAM (GRAM) TOPICAL 2 TIMES DAILY
Status: DISCONTINUED | OUTPATIENT
Start: 2024-05-08 | End: 2024-05-09

## 2024-05-08 RX ORDER — ONDANSETRON HYDROCHLORIDE 2 MG/ML
4 INJECTION, SOLUTION INTRAVENOUS ONCE
Status: COMPLETED | OUTPATIENT
Start: 2024-05-08 | End: 2024-05-08

## 2024-05-08 RX ORDER — SPIRONOLACTONE 25 MG/1
25 TABLET ORAL DAILY
Status: DISCONTINUED | OUTPATIENT
Start: 2024-05-08 | End: 2024-05-10 | Stop reason: HOSPADM

## 2024-05-08 RX ORDER — POTASSIUM CHLORIDE 20 MEQ/1
40 TABLET, EXTENDED RELEASE ORAL ONCE
Status: COMPLETED | OUTPATIENT
Start: 2024-05-08 | End: 2024-05-08

## 2024-05-08 RX ORDER — AMLODIPINE BESYLATE 5 MG/1
5 TABLET ORAL DAILY
Status: DISCONTINUED | OUTPATIENT
Start: 2024-05-08 | End: 2024-05-10 | Stop reason: HOSPADM

## 2024-05-08 RX ORDER — DULOXETIN HYDROCHLORIDE 60 MG/1
60 CAPSULE, DELAYED RELEASE ORAL
Status: DISCONTINUED | OUTPATIENT
Start: 2024-05-08 | End: 2024-05-10 | Stop reason: HOSPADM

## 2024-05-08 RX ORDER — ALBUTEROL SULFATE 90 UG/1
2 AEROSOL, METERED RESPIRATORY (INHALATION) EVERY 6 HOURS PRN
Status: DISCONTINUED | OUTPATIENT
Start: 2024-05-08 | End: 2024-05-10 | Stop reason: HOSPADM

## 2024-05-08 RX ORDER — TIZANIDINE 4 MG/1
4 TABLET ORAL ONCE
Status: COMPLETED | OUTPATIENT
Start: 2024-05-08 | End: 2024-05-08

## 2024-05-08 RX ORDER — CLONIDINE HYDROCHLORIDE 0.1 MG/1
0.1 TABLET ORAL
Status: DISCONTINUED | OUTPATIENT
Start: 2024-05-09 | End: 2024-05-10 | Stop reason: HOSPADM

## 2024-05-08 RX ORDER — POTASSIUM CHLORIDE 14.9 MG/ML
20 INJECTION INTRAVENOUS ONCE
Status: COMPLETED | OUTPATIENT
Start: 2024-05-08 | End: 2024-05-08

## 2024-05-08 RX ORDER — FLUTICASONE FUROATE AND VILANTEROL 100; 25 UG/1; UG/1
1 POWDER RESPIRATORY (INHALATION)
Status: DISCONTINUED | OUTPATIENT
Start: 2024-05-08 | End: 2024-05-10 | Stop reason: HOSPADM

## 2024-05-08 RX ORDER — POTASSIUM CHLORIDE 14.9 MG/ML
INJECTION INTRAVENOUS
Status: COMPLETED
Start: 2024-05-08 | End: 2024-05-08

## 2024-05-08 RX ORDER — CHOLESTYRAMINE 4 G/9G
4 POWDER, FOR SUSPENSION ORAL 2 TIMES DAILY
Status: DISCONTINUED | OUTPATIENT
Start: 2024-05-08 | End: 2024-05-10

## 2024-05-08 RX ORDER — CALCIUM CARBONATE 300MG(750)
400 TABLET,CHEWABLE ORAL 2 TIMES DAILY
COMMUNITY
End: 2024-05-10 | Stop reason: HOSPADM

## 2024-05-08 RX ORDER — ONDANSETRON HYDROCHLORIDE 2 MG/ML
INJECTION, SOLUTION INTRAVENOUS
Status: COMPLETED
Start: 2024-05-08 | End: 2024-05-08

## 2024-05-08 RX ADMIN — POTASSIUM CHLORIDE 20 MEQ: 1500 TABLET, EXTENDED RELEASE ORAL at 15:25

## 2024-05-08 RX ADMIN — ONDANSETRON 4 MG: 2 INJECTION INTRAMUSCULAR; INTRAVENOUS at 01:18

## 2024-05-08 RX ADMIN — POTASSIUM CHLORIDE 20 MEQ: 14.9 INJECTION, SOLUTION INTRAVENOUS at 06:55

## 2024-05-08 RX ADMIN — AMLODIPINE BESYLATE 5 MG: 5 TABLET ORAL at 15:25

## 2024-05-08 RX ADMIN — TIZANIDINE 4 MG: 4 TABLET ORAL at 20:41

## 2024-05-08 RX ADMIN — POTASSIUM CHLORIDE 20 MEQ: 14.9 INJECTION, SOLUTION INTRAVENOUS at 00:21

## 2024-05-08 RX ADMIN — MAGNESIUM OXIDE TAB 400 MG (241.3 MG ELEMENTAL MG) 400 MG: 400 (241.3 MG) TAB at 15:25

## 2024-05-08 RX ADMIN — ACETAMINOPHEN 650 MG: 325 TABLET ORAL at 15:25

## 2024-05-08 RX ADMIN — ONDANSETRON HYDROCHLORIDE 4 MG: 2 INJECTION, SOLUTION INTRAVENOUS at 01:18

## 2024-05-08 RX ADMIN — MAGNESIUM SULFATE HEPTAHYDRATE 2 G: 40 INJECTION, SOLUTION INTRAVENOUS at 07:10

## 2024-05-08 RX ADMIN — MAGNESIUM OXIDE TAB 400 MG (241.3 MG ELEMENTAL MG) 400 MG: 400 (241.3 MG) TAB at 21:53

## 2024-05-08 RX ADMIN — TIZANIDINE 4 MG: 4 TABLET ORAL at 03:25

## 2024-05-08 RX ADMIN — OXYCODONE HYDROCHLORIDE 5 MG: 5 TABLET ORAL at 15:25

## 2024-05-08 RX ADMIN — FLUTICASONE PROPIONATE 1 SPRAY: 50 SPRAY, METERED NASAL at 15:25

## 2024-05-08 RX ADMIN — CHOLESTYRAMINE 4 G: 4 POWDER, FOR SUSPENSION ORAL at 20:41

## 2024-05-08 RX ADMIN — OXYCODONE HYDROCHLORIDE 5 MG: 5 TABLET ORAL at 21:53

## 2024-05-08 RX ADMIN — FLUTICASONE FUROATE AND VILANTEROL TRIFENATATE 1 PUFF: 100; 25 POWDER RESPIRATORY (INHALATION) at 15:26

## 2024-05-08 RX ADMIN — DULOXETINE HYDROCHLORIDE 60 MG: 60 CAPSULE, DELAYED RELEASE ORAL at 15:25

## 2024-05-08 RX ADMIN — POTASSIUM CHLORIDE 20 MEQ: 14.9 INJECTION, SOLUTION INTRAVENOUS at 21:53

## 2024-05-08 RX ADMIN — ACETAMINOPHEN 650 MG: 325 TABLET ORAL at 20:41

## 2024-05-08 RX ADMIN — FAMOTIDINE 20 MG: 40 TABLET ORAL at 20:41

## 2024-05-08 RX ADMIN — POTASSIUM CHLORIDE 40 MEQ: 1500 TABLET, EXTENDED RELEASE ORAL at 07:12

## 2024-05-08 ASSESSMENT — PAIN SCALES - GENERAL
PAINLEVEL_OUTOF10: 9
PAINLEVEL_OUTOF10: 7
PAINLEVEL_OUTOF10: 9
PAINLEVEL_OUTOF10: 3

## 2024-05-08 ASSESSMENT — PAIN - FUNCTIONAL ASSESSMENT
PAIN_FUNCTIONAL_ASSESSMENT: 0-10

## 2024-05-08 ASSESSMENT — ENCOUNTER SYMPTOMS
LIGHT-HEADEDNESS: 0
ABDOMINAL PAIN: 0
DIARRHEA: 1
CHILLS: 0
SHORTNESS OF BREATH: 0
MYALGIAS: 0
FEVER: 0
TREMORS: 0
VOMITING: 1
NAUSEA: 1
DIZZINESS: 0

## 2024-05-08 ASSESSMENT — PAIN DESCRIPTION - PAIN TYPE: TYPE: ACUTE PAIN

## 2024-05-08 NOTE — CONSULTS
NEPHROLOGY NEW CONSULT NOTE   Sharla Ramirez   44 y.o.    @WT@  N/Room: [id number]    Reason for consult: HypoK, hypoMg    HPI:  Patient is a 43-year-old female with a past medical history significant for hypokalemia, hypomagnesemia chronically with unknown etiology currently on oral supplementation, asthma, anxiety, CRPS type I of LLE, hypertension (diagnosed after her first pregnancy at age 19) who presented to the emergency room due to concern for electrolyte imbalance. She endorsed SOB, tremors, tachycardia.     She has a long history of hypoK, hypoMg as well as hypophos. Patient states she has history of diarrhea, intermittent vomiting which has been ongoing for multiple years which seems to be since her diagnosis of cholelithiasis however since her cholecystectomy in 2022 she is able to eat more but still has episodes of loose stools with vomiting. She states she tries to eat 3 meals / day with gatorade. No OTC supplements, diuretic use. Her hydrochlorothiazide was discontinued last year.     In February 2023 patient was admitted for acute on chronic hypokalemia. 24h urine potassium was 3mmol.  Renin wnl, aldosterone serum < 0.3. Her hydrochlorothiazide was discontinued. She was stared on Losartan and continued on Aldactone.   Pt was diagnosed with HTN at 20 y/o, currently on Amlodipine 5 mg, Losartan 100 mg, Spironolactone 25 mg, and clonidine 0.1 mg daily at home. Her home BP is usually in the 130s/80s. She is prescribed potassium 20meq daily however unclear if she takes it daily     She saw nephrology in February 2024 after being hospitalized for hypoK/hypoMg. Repleted in the ED, discharged with nephrology follow up. 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  Social History     Socioeconomic History    Marital status: Single     Spouse name: Not on file    Number of children: Not on file    Years of education: Not on file    Highest education level: Not on file   Occupational History    Not on file   Tobacco Use    Smoking status: Every Day     Types: Cigarettes    Smokeless tobacco: Never   Substance and Sexual Activity    Alcohol use: Yes     Comment: sociall on weekends    Drug use: Not Currently    Sexual activity: Not on file   Other Topics Concern    Not on file   Social History Narrative    Not on file     Social Determinants of Health     Financial Resource Strain: Low Risk  (2/23/2024)    Overall Financial Resource Strain (CARDIA)     Difficulty of Paying Living Expenses: Not very hard   Food Insecurity: Food Insecurity Present (7/6/2023)    Received from [employer]    Hunger Vital Sign     Worried About Running Out of Food in the Last Year: Never true     Ran Out of Food in the Last Year: Often true   Transportation Needs: No Transportation Needs (2/23/2024)    PRAPARE - Transportation     Lack of Transportation (Medical): No     Lack of Transportation (Non-Medical): No   Physical Activity: Insufficiently Active (10/17/2023)    Exercise Vital Sign     Days of Exercise per Week: 1 day     Minutes of Exercise per Session: 10 min   Stress: No Stress Concern Present (7/6/2023)    Received from [employer]    [employer] - Occupational Stress Questionnaire     Feeling of Stress : Not at all   Social Connections: Unknown (7/6/2023)    Received from [employer]    Social Connection and Isolation Panel [NHANES]     Frequency of Communication with Friends and Family: More than three times a  week     Frequency of Social Gatherings with Friends and Family: More than three times a week     Attends [employer] Services: Patient refused     Active Member of Clubs or Organizations: Yes     Attends Club or Organization Meetings: Patient refused     Marital Status:    Intimate Partner Violence: Not At Risk (7/6/2023)    Received from [facility]    Humiliation, Afraid, Rape, and Kick questionnaire     Fear of Current or Ex-Partner: No     Emotionally Abused: No     Physically Abused: No     Sexually Abused: No   Housing Stability: Low Risk  (2/23/2024)    Housing Stability Vital Sign     Unable to Pay for Housing in the Last Year: No     Number of Places Lived in the Last Year: 1     Unstable Housing in the Last Year: No       Allergies   Allergen Reactions    Latex Rash    Morphine Hives and Itching     swelling and itching        (Not in a hospital admission)       Meds:   HYDROmorphone, 1 mg, Once  magnesium sulfate, 2 g, Once  potassium chloride, 20 mEq, q2h              Vitals:    05/08/24 0553   BP: 99/72   Pulse: 64   Resp: 16   Temp:    SpO2: 98%        05/06 1900 - 05/08 0659  In: 250   Out: -    Weight change:      General appearance: AAOx3. No distress  Eyes: non-icteric  Skin: no apparent rash  Lungs: CTA bilat.    Abdomen: soft, nt/nd  Extremities: no edema bilat  Neuro: No FND      ASSESSMENT:  Patient is a 43-year-old female with a past medical history significant for hypokalemia, hypomagnesemia chronically with unknown etiology currently on oral supplementation, asthma, anxiety, CRPS type I of LLE, hypertension (diagnosed after her first pregnancy at age 19) who presented to the emergency room due to concern for electrolyte imbalance.     #Hypokalemia  #Hypomagesemia  #hypophos  Multifactorial etiology. With history of HTN, hypokalemia and hypoMg Liddle syndrome remains on differential. Hx of metabolic alkalosis as well however renin/ayush wnl. With hypokalemia, hypochloremic metabolic  alkolosis noted on prior labwork it suggests vomiting (which would cause increased urinary potassium excretion).   Her severe hypophosphatemia and hypoMg however are concerning for low PO intake/malnutrition.       Recommendations:  - Recommend repeat UA, BMP and VBG. Will evaluate for renal losses with urine creatinine, urine potassium, urine magnesium, urine phosphate levels   - urine diuretic screen ordered   - strict Is/Os  - Electrolyte levels improving. Continue repletion as needed. Would check RFP, Mg BID  - Patient may benefit from GI evaluation given long standing history of diarrhea/vomiting causing poor nutrition     Buffy Vail DO  Nephrology Fellow  24 hour Renal Pager - [phone]    Discussed with attending nephrologist

## 2024-05-08 NOTE — DISCHARGE INSTRUCTIONS
Ms. Ramirez,     You were admitted to the hospital for diarrhea and vomiting as well as electrolytes (magnesium and potassium).  Based on your symptoms happening right after your gallbladder was taken out, we were concerned for bile acid induced diarrhea.  We started you on a treatment for this called cholestyramine.  This is a medication that binds to the bile acid in your GI tract so that they can be excreted without causing diarrhea.  We also gave you potassium and magnesium while you are here.  We would like for you to have a follow-up appointment with the kidney doctors because of your electrolytes as well as continue to follow-up with a gastroenterologist.  We would like for you to follow-up with your primary care doctor. We have also put in a referral for you to get a new primary care doctor (they should call to reach out to you about setting this up) or you can seek out a specific provider on your own.    To Do:  -Please go to any [facility] lab to get your magnesium and renal function panel drawn in 1 week ( around 5/17).  These results will be sent to your primary care doctor and they will reach out to you if anything is abnormal.  -The scheduling offices for gastroenterology and nephrology will be in touch to set up an appointment with you.  -Please set up an appointment with a new primary care doctor or select one of the ones from the scheduling office when they give you a call.  Your current primary care physician will continue to follow you currently until you have another one set up.     It was a pleasure taking care of you at [facility].    Sincerely,   [facility] Internal Medicine Team

## 2024-05-08 NOTE — H&P
History Of Present Illness  Sharla Ramirez is a 44 y.o. female w/ PMH significant for hypokalemia, chornic hypomagnesemia with unknown entiology on oral supplementation, asthma, anxiety, CRPS type 1 of LLE, and HTN presenting with concern for electrolyte imbalance.     HPI:   Reports long history of prior episodes of low electrolytes (Mg and K). She was admitted most recently for hypokalemia in February 2023. At that time a 24-hour urine potassium was 3 mmol, her renin was within normal limits, and serum aldosterone was less than 0.3. She has been on losartan and spironolactone to try and mediate the electrolyte losses. She also has a notable history of hypertension dx at 19 years old. She has had 8 admissions for low potassium/magnesium since her cholecystectomy in 2022.     Received ketamine infusion 1 day ago for CRPS of LLE and developed nausea and 2 episode of emesis. Of note, pt has had baseline poor appetite and multiple daily episodes of nonbloody, yellow emesis and chronic diarrhea (3 watery, nonbloody, yellow BMs per day) since her cholecystectomy in 2022. She takes Zofran, to relief, when she has access to it. Endorses occasional dizziness that is not associated with vomiting and diarrhea. Does not notice anything that triggers, worsens, or improves these episodes. Denies fever, headache, vision changes, chest pain, palpitations, and recent international travel.     Of note, patient was referred to outpatient endocrinology, but did not make appointment. Per endocrine note 3/5, suspect possible Liddle's syndrome (HTN, low aldosterone, low renin, high bicarb, low K) and recommended nephrology referral. Patient has not met with nephrology outpatient.     For her HTN, she is currently on Amlodipine 5 mg, Losartan 100 mg, Spironolactone 25 mg, and clonidine 0.1 mg daily at home. Her home BP is usually in the 130s/80s.     ED Course   Symptoms in the ED include chills, diffuse aches and tremors, tachycardia,  and SOB. Pt was hypokalemic to 2.6, hypomagnesemic to 1.36, and hypophosphatemic to 1.6. Repletion administered. Repeat potassium minimally improved to 2.9. Phosphorus and magnesium improved to wnl; 2.8 and 1.64 respectively. Admitted to medicine d/t minimal improvement.       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 She started smoking about 27 years ago. She has a 13.5 pack-year smoking history. She has never used smokeless tobacco. She reports current alcohol use. She reports that she does not currently use drugs.    Family History  Family History   Problem Relation Name Age of Onset    Hypertension Mother      Hypokalemia Mother      Cholelithiasis Mother      Constipation Mother      Bilateral breast cancer Mother         Allergies  Latex and Morphine    Review of Systems   Constitutional:  Negative for chills and fever.   Eyes:  Negative for visual disturbance.   Respiratory:  Negative for shortness of breath.    Cardiovascular:  Negative for chest pain and leg swelling.   Gastrointestinal:  Positive for diarrhea, nausea and vomiting. Negative for abdominal pain.   Musculoskeletal:  Negative for myalgias.   Neurological:  Negative for dizziness, tremors and light-headedness.        Physical Exam  Constitutional:       Appearance: Normal appearance.   HENT:      Head: Normocephalic and atraumatic.   Eyes:      Extraocular Movements: Extraocular movements intact.      Pupils: Pupils are equal, round, and reactive to light.   Cardiovascular:      Rate and Rhythm: Normal rate and regular rhythm.      Heart  sounds: No murmur heard.     No friction rub. No gallop.   Pulmonary:      Effort: Pulmonary effort is normal.      Breath sounds: Normal breath sounds. No wheezing, rhonchi or rales.   Abdominal:      General: Abdomen is flat. Bowel sounds are normal.      Palpations: Abdomen is soft.      Tenderness: There is no abdominal tenderness.   Musculoskeletal:      Cervical back: Normal range of motion.      Right lower leg: No edema.      Left lower leg: No edema.   Skin:     General: Skin is warm and dry.   Neurological:      General: No focal deficit present.      Mental Status: She is alert.   Psychiatric:         Mood and Affect: Mood normal.         Behavior: Behavior normal.        Last Recorded Vitals  Blood pressure (!) 124/97, pulse 77, temperature 36.2 °C (97.2 °F), temperature source Temporal, resp. rate 18, last menstrual period 04/10/2024, SpO2 97%.    Relevant Results  Scheduled medications  HYDROmorphone, 1 mg, intravenous, Once      Continuous medications     PRN medications    Results for orders placed or performed during the hospital encounter of 05/07/24 (from the past 24 hour(s))   CBC and Auto Differential   Result Value Ref Range    WBC 5.7 4.4 - 11.3 x10*3/uL    nRBC 0.0 0.0 - 0.0 /100 WBCs    RBC 3.60 (L) 4.00 - 5.20 x10*6/uL    Hemoglobin 11.9 (L) 12.0 - 16.0 g/dL    Hematocrit 32.3 (L) 36.0 - 46.0 %    MCV 90 80 - 100 fL    MCH 33.1 26.0 - 34.0 pg    MCHC 36.8 (H) 32.0 - 36.0 g/dL    RDW 13.1 11.5 - 14.5 %    Platelets 315 150 - 450 x10*3/uL    Neutrophils % 54.8 40.0 - 80.0 %    Immature Granulocytes %, Automated 0.2 0.0 - 0.9 %    Lymphocytes % 36.6 13.0 - 44.0 %    Monocytes % 7.9 2.0 - 10.0 %    Eosinophils % 0.2 0.0 - 6.0 %    Basophils % 0.3 0.0 - 2.0 %    Neutrophils Absolute 3.14 1.20 - 7.70 x10*3/uL    Immature Granulocytes Absolute, Automated 0.01 0.00 - 0.70 x10*3/uL    Lymphocytes Absolute 2.10 1.20 - 4.80 x10*3/uL    Monocytes Absolute 0.45 0.10 - 1.00 x10*3/uL    Eosinophils  Absolute 0.01 0.00 - 0.70 x10*3/uL    Basophils Absolute 0.02 0.00 - 0.10 x10*3/uL   Comprehensive metabolic panel   Result Value Ref Range    Glucose 100 (H) 74 - 99 mg/dL    Sodium 139 136 - 145 mmol/L    Potassium 2.6 (LL) 3.5 - 5.3 mmol/L    Chloride 100 98 - 107 mmol/L    Bicarbonate 20 (L) 21 - 32 mmol/L    Anion Gap 22 (H) 10 - 20 mmol/L    Urea Nitrogen 7 6 - 23 mg/dL    Creatinine 0.52 0.50 - 1.05 mg/dL    eGFR >90 >60 mL/min/1.73m*2    Calcium 8.6 8.6 - 10.6 mg/dL    Albumin 3.9 3.4 - 5.0 g/dL    Alkaline Phosphatase 108 33 - 110 U/L    Total Protein 7.3 6.4 - 8.2 g/dL    AST 50 (H) 9 - 39 U/L    Bilirubin, Total 0.8 0.0 - 1.2 mg/dL    ALT 24 7 - 45 U/L   Magnesium   Result Value Ref Range    Magnesium 1.36 (L) 1.60 - 2.40 mg/dL   Phosphorus   Result Value Ref Range    Phosphorus 1.6 (L) 2.5 - 4.9 mg/dL   Light Blue Top   Result Value Ref Range    Extra Tube Hold for add-ons.    SST TOP   Result Value Ref Range    Extra Tube Hold for add-ons.    Lavender Top   Result Value Ref Range    Extra Tube Hold for add-ons.    ECG 12 Lead   Result Value Ref Range    Ventricular Rate 100 BPM    Atrial Rate 100 BPM    KS Interval 134 ms    QRS Duration 76 ms    QT Interval 356 ms    QTC Calculation(Bazett) 459 ms    P Axis 50 degrees    R Axis -2 degrees    T Axis 16 degrees    QRS Count 16 beats    Q Onset 222 ms    P Onset 155 ms    P Offset 203 ms    T Offset 400 ms    QTC Fredericia 422 ms   POCT pregnancy, urine   Result Value Ref Range    Preg Test, Ur Negative Negative   Potassium   Result Value Ref Range    Potassium 2.9 (LL) 3.5 - 5.3 mmol/L   Magnesium   Result Value Ref Range    Magnesium 1.64 1.60 - 2.40 mg/dL   Phosphorus   Result Value Ref Range    Phosphorus 2.8 2.5 - 4.9 mg/dL   Magnesium   Result Value Ref Range    Magnesium 2.52 (H) 1.60 - 2.40 mg/dL     ECG 12 Lead    Result Date: 5/8/2024  Normal sinus rhythm Nonspecific T wave abnormality Abnormal ECG When compared with ECG of 22-FEB-2024 01:38,  Nonspecific T wave abnormality, worse in Lateral leads See ED provider note for full interpretation and clinical correlation Confirmed by Evelyn Jimenez ([id number]) on 5/8/2024 5:30:32 AM         Assessment/Plan   Principal Problem:    Hypokalemia      Sharla Ramirez is a 45 y/o F with a PMH significant for HTN, chronic hypokalemia, hypomagnesemia, asthma, anxiety, and CPRS type 1 of the LLE presenting for further workup of electrolyte abnormalities. She is currently stable. Most likely etiology would be renal loss of electrolytes vs GI. Ongoing diarrhea workup with stool electrolytes, O&P and stool pathogen panel. Nephrology following for renal workup, including urine diuretic screen, urine creatnine and phosphorus, VBG, and BMP. H/o cholecystectomy in 2022, after which pt states most of her sx started; possible bile acid diarrhea component.     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 d/t h/o (HTN, low aldosterone, low renin, high bicarb, low K), referred to nephrology but never followed up   - Pt currently asymptomatic after electrolyte repletion in the ED  - Nephrology consulted; recommend testing urine creatinine, phosphorus, VBG with repeat BMP, and urine diuretic screen d/t concern for diuretic abuse   - Hold Losartan and Spironolactone for urine testing  - Continue home potassium chloride daily and home magnesium oxide BID   - PM RFP     #Chronic watery diarrhea  :: 3+ nonbloody, watery stools daily; not associated with dizziness.   :: Hemodynamically normal and chloride wnl this admission  :: Negative travel history   - Ordered stool O&P + Giardia/Cryptosporidium antigen, stool pathogen panel, and stool electrolytes   - Started cholestyramine for possible bile acid diarrhea component  - PM RFP     #Chronic vomiting   :: Baseline poor appetite and several episodes of nonbloody emesis daily   :: 2 episodes  of nonbloody emesis before presention to the ED  - Start Zofran Q6 hrs PRN     #CPRS of LLE  - Reports chronic pain in LLE  -Continue on home Duloxetine daily     #Asthma  - Stable on RA; only uses PRN albuterol periodically when weather changes  - Continue home Advair daily and albuterol PRN     #Allergic Rhinitis    - Currently asymptomatic  - Continue home Flonase and Zyrtec daily       Fluids: -   VTE ppx: Ambulation, SCDs  Diet: Adult regular  Bowel regimen: -   PPX: Famotidine 20 mg  Code status: Full code  Contact: Brayan Shetty: [phone]  Dispo: FITO Blackwell, MS4

## 2024-05-08 NOTE — PROGRESS NOTES
Emergency Medicine Transition of Care Note.    I received Sharla Ramirez in signout from previous provider. Please see the previous ED provider note for all HPI, PE and MDM up to the time of sign-out. This is in addition to the primary record.    ED Course as of 05/08/24 0628   Tue May 07, 2024   1848 EKG independently interpreted by me. Sinus tachycardia. Rate of 100. Qtc 459. Normal axis. Poor baseline due to tremors, but no acute ST or T wave changes noted.  [YH]   1918 K 2.6, Mg 1.36, Phos 1.6. Repletion ordered.  [YH]      ED Course User Index  [YH] Sandra Antonio MD         Diagnoses as of 05/08/24 0628   Hypokalemia   Hypophosphatemia   Hypomagnesemia   Nausea and vomiting, unspecified vomiting type     Medical Decision Making    Final diagnoses:   None     At the time of sign out, vital signs were as follows:  Vitals:    05/08/24 0124   BP: (!) 148/94   Pulse: 85   Resp: 17   Temp:    SpO2: 95%     In brief Sharla Ramirez is an 44 y.o. female presenting for tremors with concern for an electrolyte abnormality. Her K Mg and PO4 were low. They were being repleted. Repeat labs were ordered. Patient was given a prescription for zofran. Patient was advised to follow up with nephrology. Her repeat Potassium was 2.9. Additional potassium and magnesium were ordered. Given minimal improvement, patient was discussed with admission coordinator and admitted to medicine      Dispo: admit    Pt seen and discussed with ED attending    Laly Hsu MD  Emergency Medicine PGY-2

## 2024-05-08 NOTE — SIGNIFICANT EVENT
HPI: Sharla Ramirez is a 44 y.o. female w/ PMH significant for chronic hypokalemia, hypomagnesemia, asthma, anxiety, CRPS type 1 of LLE, and HTN presenting for further work up of electrolyte abnormalities.  Patient states that in 2022 she had a cholecystectomy for gallstones.  Since that time she has had watery stools (about 3 to 4/day) as well as nausea vomiting several days a week.  She did denies bloody stools or emesis or melena.  She has been seen in the emergency department several times for hypomagnesemia and hypokalemia.      She was previously admitted in February 2023 for hypokalemia.  At that time a 24-hour urine potassium was 3 mmol, her renin was within normal limits, and serum aldosterone was less than 0.3.  She has been on losartan and spironolactone to try and mediate the electrolyte losses.  She also has a notable history of hypertension which was diagnosed at 19 years old.    She was evaluated in 2023 for this diarrhea by the gastroenterology team, however no etiology was determined at that time.     Patient has been admitted to the hospital 8 times for low potassium/ magnesium since her cholecystectomy.     ED Course:   BP: 153/100 HR: 98 RR:16 A feb   RFP: / K 2.6/ Cl 100/ HCO3 20/ BUN7/ cr 0.52  AST 50 ALT 24  Mag 1.36  CBC: WBC 5.7/ Hgb 11.9/ hct32.2/ plt 315  Upreg negative   Interventions: Mag 4IV, potassium 80meq IV --> 40meq      Physical Exam     Gen: Alert, well appearing, in NAD  Head/Neck: normocephalic, atraumatic  Eyes: EOMI, PERRL, anicteric sclerae, noninjected conjunctivae  Nose: No congestion or rhinorrhea  Mouth:  MMM  Heart: RRR, no murmurs, rubs, or gallops  Lungs: No increased work of breathing, lungs clear bilaterally, no wheezing, crackles, rhonchi  Abdomen: soft, NT, ND, no HSM, no palpable masses, good bowel sounds  Musculoskeletal: no joint swelling  Extremities: WWP, cap refill <2sec  Neurologic: Alert, symmetrical facies, phonates clearly, moves all extremities  equally, responsive to touch  Skin: no rashes  Psychological: appropriate mood/affect    Assessment and Plan   Sharla Ramirez is a 44 y.o. female w/ PMH significant for chronic hypokalemia, hypomagnesemia, asthma, anxiety, CRPS type 1 of LLE, and HTN presenting for further work up of electrolyte abnormalities.  Her last send most likely GI versus renal.  Nephrology consulted and recommended urinalysis, VBG, urine creatinine, urine potassium, urine magnesium, and urine phosphate levels.  Furthermore urine diuretic screen was obtained.  Stool studies were obtained including stool PCR, ova and parasite, stool alpha-1 antitrypsin, and stool electrolytes.  Patient appears to have had symptoms since her cholecystectomy which could not indicate, postcholecystectomy related diarrhea, so low-dose cholestyramine was started for potential symptomatic benefit.  After further stool studies are back, plan to consult GI if other workup is unrevealing and symptoms not improved.    #Chronic hypokalemia  #Chronic hypomagnesemia   - BID RFP/Mag   - EKG poor quality but not significant abnormalities appreciated   - Nephrology following   - [ ] Follow up urine electrolytes, urine phos, urine creatinine, diuretic screen, VBG     #Chronic diarrhea and vomiting   [ ] KUB   [ ] Stool O+P, Stool PCR, stool electrolytes, stool A1AT  - Trial cholestyramine for symptomatic benefit of potential post CCK diarrhea   - Could consider trial of loperamide if infectious workup negative     #HTN   - Hold spironolactone and losartan pending urine studies to not cause false results   - Continue clonidine and amlodipine   - Could consider PO hydral if alternative agent needed while holding other antiHTN    #Asthma   - Continue advair and albuterol PRN    #CRPS  - Tylenol scheduled, oxycodone as needed for severe pain    Full Code   Surrogate Decision Maker: Diogenes Millan [phone]    Annabel Díaz MD

## 2024-05-09 LAB
ALBUMIN SERPL BCP-MCNC: 3.9 G/DL (ref 3.4–5)
ALBUMIN SERPL BCP-MCNC: 4 G/DL (ref 3.4–5)
ANION GAP SERPL CALC-SCNC: 14 MMOL/L (ref 10–20)
ANION GAP SERPL CALC-SCNC: 15 MMOL/L (ref 10–20)
APTT PPP: 29 SECONDS (ref 27–38)
BUN SERPL-MCNC: 3 MG/DL (ref 6–23)
BUN SERPL-MCNC: 6 MG/DL (ref 6–23)
CALCIUM SERPL-MCNC: 9.2 MG/DL (ref 8.6–10.6)
CALCIUM SERPL-MCNC: 9.3 MG/DL (ref 8.6–10.6)
CHLORIDE SERPL-SCNC: 101 MMOL/L (ref 98–107)
CHLORIDE SERPL-SCNC: 101 MMOL/L (ref 98–107)
CO2 SERPL-SCNC: 25 MMOL/L (ref 21–32)
CO2 SERPL-SCNC: 25 MMOL/L (ref 21–32)
CREAT SERPL-MCNC: 0.44 MG/DL (ref 0.5–1.05)
CREAT SERPL-MCNC: 0.5 MG/DL (ref 0.5–1.05)
CREAT UR-MCNC: 120.7 MG/DL (ref 20–320)
EGFRCR SERPLBLD CKD-EPI 2021: >90 ML/MIN/1.73M*2
EGFRCR SERPLBLD CKD-EPI 2021: >90 ML/MIN/1.73M*2
ERYTHROCYTE [DISTWIDTH] IN BLOOD BY AUTOMATED COUNT: 13.3 % (ref 11.5–14.5)
GLUCOSE SERPL-MCNC: 108 MG/DL (ref 74–99)
GLUCOSE SERPL-MCNC: 140 MG/DL (ref 74–99)
HCT VFR BLD AUTO: 34.8 % (ref 36–46)
HGB BLD-MCNC: 11.9 G/DL (ref 12–16)
INR PPP: 0.9 (ref 0.9–1.1)
MAGNESIUM SERPL-MCNC: 2 MG/DL (ref 1.6–2.4)
MAGNESIUM SERPL-MCNC: 2.01 MG/DL (ref 1.6–2.4)
MAGNESIUM UR-MCNC: 4.34 MG/DL
MAGNESIUM/CREAT UR: 36 MG/G CREAT
MCH RBC QN AUTO: 33.2 PG (ref 26–34)
MCHC RBC AUTO-ENTMCNC: 34.2 G/DL (ref 32–36)
MCV RBC AUTO: 97 FL (ref 80–100)
NRBC BLD-RTO: 0 /100 WBCS (ref 0–0)
PHOSPHATE SERPL-MCNC: 3.7 MG/DL (ref 2.5–4.9)
PHOSPHATE SERPL-MCNC: 4.1 MG/DL (ref 2.5–4.9)
PLATELET # BLD AUTO: 275 X10*3/UL (ref 150–450)
POTASSIUM SERPL-SCNC: 3.3 MMOL/L (ref 3.5–5.3)
POTASSIUM SERPL-SCNC: 3.5 MMOL/L (ref 3.5–5.3)
PROTHROMBIN TIME: 10.4 SECONDS (ref 9.8–12.8)
RBC # BLD AUTO: 3.58 X10*6/UL (ref 4–5.2)
SODIUM SERPL-SCNC: 137 MMOL/L (ref 136–145)
SODIUM SERPL-SCNC: 137 MMOL/L (ref 136–145)
WBC # BLD AUTO: 3.9 X10*3/UL (ref 4.4–11.3)

## 2024-05-09 PROCEDURE — 82103 ALPHA-1-ANTITRYPSIN TOTAL: CPT | Performed by: STUDENT IN AN ORGANIZED HEALTH CARE EDUCATION/TRAINING PROGRAM

## 2024-05-09 PROCEDURE — 97161 PT EVAL LOW COMPLEX 20 MIN: CPT | Mod: GP

## 2024-05-09 PROCEDURE — 99232 SBSQ HOSP IP/OBS MODERATE 35: CPT

## 2024-05-09 PROCEDURE — 85027 COMPLETE CBC AUTOMATED: CPT | Performed by: STUDENT IN AN ORGANIZED HEALTH CARE EDUCATION/TRAINING PROGRAM

## 2024-05-09 PROCEDURE — 84302 ASSAY OF SWEAT SODIUM: CPT | Performed by: STUDENT IN AN ORGANIZED HEALTH CARE EDUCATION/TRAINING PROGRAM

## 2024-05-09 PROCEDURE — 36415 COLL VENOUS BLD VENIPUNCTURE: CPT | Performed by: STUDENT IN AN ORGANIZED HEALTH CARE EDUCATION/TRAINING PROGRAM

## 2024-05-09 PROCEDURE — 2500000004 HC RX 250 GENERAL PHARMACY W/ HCPCS (ALT 636 FOR OP/ED): Performed by: STUDENT IN AN ORGANIZED HEALTH CARE EDUCATION/TRAINING PROGRAM

## 2024-05-09 PROCEDURE — 2500000005 HC RX 250 GENERAL PHARMACY W/O HCPCS: Performed by: STUDENT IN AN ORGANIZED HEALTH CARE EDUCATION/TRAINING PROGRAM

## 2024-05-09 PROCEDURE — 80069 RENAL FUNCTION PANEL: CPT | Performed by: STUDENT IN AN ORGANIZED HEALTH CARE EDUCATION/TRAINING PROGRAM

## 2024-05-09 PROCEDURE — 83735 ASSAY OF MAGNESIUM: CPT | Performed by: STUDENT IN AN ORGANIZED HEALTH CARE EDUCATION/TRAINING PROGRAM

## 2024-05-09 PROCEDURE — 2500000006 HC RX 250 W HCPCS SELF ADMINISTERED DRUGS (ALT 637 FOR ALL PAYERS): Performed by: STUDENT IN AN ORGANIZED HEALTH CARE EDUCATION/TRAINING PROGRAM

## 2024-05-09 PROCEDURE — 85610 PROTHROMBIN TIME: CPT | Performed by: STUDENT IN AN ORGANIZED HEALTH CARE EDUCATION/TRAINING PROGRAM

## 2024-05-09 PROCEDURE — 2500000002 HC RX 250 W HCPCS SELF ADMINISTERED DRUGS (ALT 637 FOR MEDICARE OP, ALT 636 FOR OP/ED): Performed by: STUDENT IN AN ORGANIZED HEALTH CARE EDUCATION/TRAINING PROGRAM

## 2024-05-09 PROCEDURE — 99223 1ST HOSP IP/OBS HIGH 75: CPT | Performed by: INTERNAL MEDICINE

## 2024-05-09 PROCEDURE — 87328 CRYPTOSPORIDIUM AG IA: CPT | Performed by: STUDENT IN AN ORGANIZED HEALTH CARE EDUCATION/TRAINING PROGRAM

## 2024-05-09 PROCEDURE — 2500000001 HC RX 250 WO HCPCS SELF ADMINISTERED DRUGS (ALT 637 FOR MEDICARE OP): Performed by: STUDENT IN AN ORGANIZED HEALTH CARE EDUCATION/TRAINING PROGRAM

## 2024-05-09 PROCEDURE — 82438 ASSAY OTHER FLUID CHLORIDES: CPT | Performed by: STUDENT IN AN ORGANIZED HEALTH CARE EDUCATION/TRAINING PROGRAM

## 2024-05-09 PROCEDURE — 1100000001 HC PRIVATE ROOM DAILY

## 2024-05-09 PROCEDURE — 87329 GIARDIA AG IA: CPT | Performed by: STUDENT IN AN ORGANIZED HEALTH CARE EDUCATION/TRAINING PROGRAM

## 2024-05-09 RX ORDER — POTASSIUM CHLORIDE 20 MEQ/1
40 TABLET, EXTENDED RELEASE ORAL
Status: COMPLETED | OUTPATIENT
Start: 2024-05-10 | End: 2024-05-10

## 2024-05-09 RX ORDER — POTASSIUM CHLORIDE 20 MEQ/1
20 TABLET, EXTENDED RELEASE ORAL DAILY
Status: DISCONTINUED | OUTPATIENT
Start: 2024-05-10 | End: 2024-05-10 | Stop reason: HOSPADM

## 2024-05-09 RX ADMIN — CLONIDINE HYDROCHLORIDE 0.1 MG: 0.1 TABLET ORAL at 06:03

## 2024-05-09 RX ADMIN — POTASSIUM PHOSPHATE, MONOBASIC POTASSIUM PHOSPHATE, DIBASIC 15 MMOL: 224; 236 INJECTION, SOLUTION, CONCENTRATE INTRAVENOUS at 00:06

## 2024-05-09 RX ADMIN — ACETAMINOPHEN 650 MG: 325 TABLET ORAL at 03:36

## 2024-05-09 RX ADMIN — LORATADINE 10 MG: 10 TABLET ORAL at 08:14

## 2024-05-09 RX ADMIN — FLUTICASONE PROPIONATE 1 SPRAY: 50 SPRAY, METERED NASAL at 11:00

## 2024-05-09 RX ADMIN — ACETAMINOPHEN 650 MG: 325 TABLET ORAL at 09:41

## 2024-05-09 RX ADMIN — DULOXETINE HYDROCHLORIDE 60 MG: 60 CAPSULE, DELAYED RELEASE ORAL at 06:03

## 2024-05-09 RX ADMIN — MAGNESIUM OXIDE TAB 400 MG (241.3 MG ELEMENTAL MG) 400 MG: 400 (241.3 MG) TAB at 08:14

## 2024-05-09 RX ADMIN — ACETAMINOPHEN 650 MG: 325 TABLET ORAL at 15:40

## 2024-05-09 RX ADMIN — ALBUTEROL SULFATE 2 PUFF: 90 AEROSOL, METERED RESPIRATORY (INHALATION) at 08:14

## 2024-05-09 RX ADMIN — SPIRONOLACTONE 25 MG: 25 TABLET, FILM COATED ORAL at 08:14

## 2024-05-09 RX ADMIN — LOSARTAN POTASSIUM 100 MG: 100 TABLET, FILM COATED ORAL at 08:14

## 2024-05-09 RX ADMIN — AMLODIPINE BESYLATE 5 MG: 5 TABLET ORAL at 08:14

## 2024-05-09 RX ADMIN — POTASSIUM CHLORIDE 20 MEQ: 1500 TABLET, EXTENDED RELEASE ORAL at 08:13

## 2024-05-09 RX ADMIN — CHOLESTYRAMINE 4 G: 4 POWDER, FOR SUSPENSION ORAL at 22:15

## 2024-05-09 RX ADMIN — OXYCODONE HYDROCHLORIDE 5 MG: 5 TABLET ORAL at 22:14

## 2024-05-09 RX ADMIN — OXYCODONE HYDROCHLORIDE 5 MG: 5 TABLET ORAL at 04:53

## 2024-05-09 RX ADMIN — ACETAMINOPHEN 650 MG: 325 TABLET ORAL at 22:15

## 2024-05-09 RX ADMIN — CHOLESTYRAMINE 4 G: 4 POWDER, FOR SUSPENSION ORAL at 09:43

## 2024-05-09 RX ADMIN — OXYCODONE HYDROCHLORIDE 5 MG: 5 TABLET ORAL at 15:40

## 2024-05-09 RX ADMIN — FAMOTIDINE 20 MG: 40 TABLET ORAL at 22:14

## 2024-05-09 RX ADMIN — OXYCODONE HYDROCHLORIDE 5 MG: 5 TABLET ORAL at 09:41

## 2024-05-09 SDOH — HEALTH STABILITY: MENTAL HEALTH: HOW MANY STANDARD DRINKS CONTAINING ALCOHOL DO YOU HAVE ON A TYPICAL DAY?: 1 OR 2

## 2024-05-09 SDOH — HEALTH STABILITY: MENTAL HEALTH: HOW OFTEN DO YOU HAVE A DRINK CONTAINING ALCOHOL?: 2-3 TIMES A WEEK

## 2024-05-09 SDOH — SOCIAL STABILITY: SOCIAL INSECURITY: HAVE YOU HAD THOUGHTS OF HARMING ANYONE ELSE?: NO

## 2024-05-09 SDOH — ECONOMIC STABILITY: HOUSING INSECURITY
IN THE LAST 12 MONTHS, WAS THERE A TIME WHEN YOU DID NOT HAVE A STEADY PLACE TO SLEEP OR SLEPT IN A SHELTER (INCLUDING NOW)?: NO

## 2024-05-09 SDOH — ECONOMIC STABILITY: HOUSING INSECURITY: IN THE LAST 12 MONTHS, HOW MANY PLACES HAVE YOU LIVED?: 1

## 2024-05-09 SDOH — SOCIAL STABILITY: SOCIAL INSECURITY: ARE THERE ANY APPARENT SIGNS OF INJURIES/BEHAVIORS THAT COULD BE RELATED TO ABUSE/NEGLECT?: NO

## 2024-05-09 SDOH — HEALTH STABILITY: MENTAL HEALTH: HOW MANY DRINKS CONTAINING ALCOHOL DO YOU HAVE ON A TYPICAL DAY WHEN YOU ARE DRINKING?: 1 OR 2

## 2024-05-09 SDOH — SOCIAL STABILITY: SOCIAL INSECURITY: HAS ANYONE EVER THREATENED TO HURT YOUR FAMILY OR YOUR PETS?: NO

## 2024-05-09 SDOH — ECONOMIC STABILITY: FOOD INSECURITY: HOW HARD IS IT FOR YOU TO PAY FOR THE VERY BASICS LIKE FOOD, HOUSING, MEDICAL CARE, AND HEATING?: VERY HARD

## 2024-05-09 SDOH — SOCIAL STABILITY: SOCIAL INSECURITY: DO YOU FEEL UNSAFE GOING BACK TO THE PLACE WHERE YOU ARE LIVING?: NO

## 2024-05-09 SDOH — HEALTH STABILITY: MENTAL HEALTH: HOW OFTEN DO YOU HAVE SIX OR MORE DRINKS ON ONE OCCASION?: NEVER

## 2024-05-09 SDOH — ECONOMIC STABILITY: INCOME INSECURITY: HOW HARD IS IT FOR YOU TO PAY FOR THE VERY BASICS LIKE FOOD, HOUSING, MEDICAL CARE, AND HEATING?: VERY HARD

## 2024-05-09 SDOH — HEALTH STABILITY: MENTAL HEALTH: HOW OFTEN DO YOU HAVE 6 OR MORE DRINKS ON ONE OCCASION?: NEVER

## 2024-05-09 SDOH — SOCIAL STABILITY: SOCIAL INSECURITY: ABUSE: ADULT

## 2024-05-09 SDOH — SOCIAL STABILITY: SOCIAL INSECURITY: WERE YOU ABLE TO COMPLETE ALL THE BEHAVIORAL HEALTH SCREENINGS?: YES

## 2024-05-09 SDOH — SOCIAL STABILITY: SOCIAL INSECURITY: DOES ANYONE TRY TO KEEP YOU FROM HAVING/CONTACTING OTHER FRIENDS OR DOING THINGS OUTSIDE YOUR HOME?: NO

## 2024-05-09 SDOH — ECONOMIC STABILITY: TRANSPORTATION INSECURITY: IN THE PAST 12 MONTHS, HAS LACK OF TRANSPORTATION KEPT YOU FROM MEDICAL APPOINTMENTS OR FROM GETTING MEDICATIONS?: YES

## 2024-05-09 SDOH — ECONOMIC STABILITY: INCOME INSECURITY: IN THE LAST 12 MONTHS, WAS THERE A TIME WHEN YOU WERE NOT ABLE TO PAY THE MORTGAGE OR RENT ON TIME?: YES

## 2024-05-09 SDOH — ECONOMIC STABILITY: HOUSING INSECURITY: IN THE LAST 12 MONTHS, WAS THERE A TIME WHEN YOU WERE NOT ABLE TO PAY THE MORTGAGE OR RENT ON TIME?: YES

## 2024-05-09 SDOH — SOCIAL STABILITY: SOCIAL INSECURITY: ARE YOU OR HAVE YOU BEEN THREATENED OR ABUSED PHYSICALLY, EMOTIONALLY, OR SEXUALLY BY ANYONE?: NO

## 2024-05-09 SDOH — SOCIAL STABILITY: SOCIAL INSECURITY: DO YOU FEEL ANYONE HAS EXPLOITED OR TAKEN ADVANTAGE OF YOU FINANCIALLY OR OF YOUR PERSONAL PROPERTY?: NO

## 2024-05-09 ASSESSMENT — COGNITIVE AND FUNCTIONAL STATUS - GENERAL
MOBILITY SCORE: 20
STANDING UP FROM CHAIR USING ARMS: A LITTLE
MOBILITY SCORE: 20
MOVING TO AND FROM BED TO CHAIR: A LITTLE
DAILY ACTIVITIY SCORE: 24
STANDING UP FROM CHAIR USING ARMS: A LITTLE
WALKING IN HOSPITAL ROOM: A LITTLE
MOVING TO AND FROM BED TO CHAIR: A LITTLE
CLIMB 3 TO 5 STEPS WITH RAILING: A LITTLE
MOVING TO AND FROM BED TO CHAIR: A LITTLE
STANDING UP FROM CHAIR USING ARMS: A LITTLE
CLIMB 3 TO 5 STEPS WITH RAILING: A LITTLE
CLIMB 3 TO 5 STEPS WITH RAILING: A LITTLE
WALKING IN HOSPITAL ROOM: A LITTLE
MOBILITY SCORE: 20
DAILY ACTIVITIY SCORE: 24
PATIENT BASELINE BEDBOUND: NO
WALKING IN HOSPITAL ROOM: A LITTLE

## 2024-05-09 ASSESSMENT — ACTIVITIES OF DAILY LIVING (ADL)
LACK_OF_TRANSPORTATION: YES
TOILETING: INDEPENDENT
PATIENT'S MEMORY ADEQUATE TO SAFELY COMPLETE DAILY ACTIVITIES?: YES
ADEQUATE_TO_COMPLETE_ADL: YES
HEARING - RIGHT EAR: FUNCTIONAL
GROOMING: INDEPENDENT
LACK_OF_TRANSPORTATION: YES
BATHING: INDEPENDENT
HEARING - LEFT EAR: FUNCTIONAL
DRESSING YOURSELF: INDEPENDENT
WALKS IN HOME: INDEPENDENT
ADL_ASSISTANCE: NEEDS ASSISTANCE
FEEDING YOURSELF: INDEPENDENT
JUDGMENT_ADEQUATE_SAFELY_COMPLETE_DAILY_ACTIVITIES: YES

## 2024-05-09 ASSESSMENT — PAIN SCALES - GENERAL
PAINLEVEL_OUTOF10: 6
PAINLEVEL_OUTOF10: 9
PAINLEVEL_OUTOF10: 9
PAINLEVEL_OUTOF10: 10 - WORST POSSIBLE PAIN

## 2024-05-09 ASSESSMENT — PAIN DESCRIPTION - ORIENTATION
ORIENTATION: RIGHT;LEFT

## 2024-05-09 ASSESSMENT — PAIN DESCRIPTION - LOCATION
LOCATION: LEG

## 2024-05-09 ASSESSMENT — LIFESTYLE VARIABLES
AUDIT-C TOTAL SCORE: 3
SKIP TO QUESTIONS 9-10: 1

## 2024-05-09 ASSESSMENT — PATIENT HEALTH QUESTIONNAIRE - PHQ9
1. LITTLE INTEREST OR PLEASURE IN DOING THINGS: NOT AT ALL
SUM OF ALL RESPONSES TO PHQ9 QUESTIONS 1 & 2: 0
2. FEELING DOWN, DEPRESSED OR HOPELESS: NOT AT ALL

## 2024-05-09 ASSESSMENT — PAIN - FUNCTIONAL ASSESSMENT
PAIN_FUNCTIONAL_ASSESSMENT: 0-10

## 2024-05-09 NOTE — PROGRESS NOTES
NEPHROLOGY FOLLOW UP NOTE    Sharla Ramirez   44 y.o.    @WT@  MRN/Room: [id number]/2070-[id number]    Subjective:   NAEON. Potassium improved to 3.7 yesterday evening following repletion with 120 meq potassium. Potassium 3.5 today.    Objective:     Meds:   acetaminophen, 650 mg, q6h  amLODIPine, 5 mg, Daily  cholestyramine, 4 g, BID  cloNIDine, 0.1 mg, Daily before breakfast  DULoxetine, 60 mg, Daily  famotidine, 20 mg, Nightly  fluticasone, 1 spray, Daily  fluticasone furoate-vilanteroL, 1 puff, Daily  loratadine, 10 mg, Daily  losartan, 100 mg, Daily  [START ON 5/10/2024] potassium chloride CR, 20 mEq, Daily  spironolactone, 25 mg, Daily         albuterol, 2 puff, q6h PRN  ondansetron ODT, 4 mg, q6h PRN  oxyCODONE, 5 mg, q6h PRN  tiZANidine, 4 mg, q6h PRN        Vitals:    05/09/24 0802   BP: 123/85   Pulse: 80   Resp:    Temp:    SpO2:         No intake or output data in the 24 hours ending 05/09/24 1542    General appearance: AAOx3. No distress  Eyes: non-icteric  Skin: no apparent rash  Lungs: CTA bilat.    Abdomen: soft, nt/nd  Extremities: no edema bilat  Neuro: No FND    Blood Labs:  Results for orders placed or performed during the hospital encounter of 05/07/24 (from the past 24 hour(s))   Creatinine, Urine Random   Result Value Ref Range    Creatinine, Urine Random 127.4 20.0 - 320.0 mg/dL   Urine electrolytes   Result Value Ref Range    Sodium, Urine Random 133 mmol/L    Sodium/Creatinine Ratio 106 Not established. mmol/g Creat    Potassium, Urine Random 34 mmol/L    Potassium/Creatinine Ratio 27 Not established mmol/g Creat    Chloride, Urine Random 140 mmol/L    Chloride/Creatinine Ratio 111 38 - 318 mmol/g creat    Creatinine, Urine Random 125.9 20.0 - 320.0 mg/dL   Phosphorus, Urine Random   Result Value Ref Range    Phosphorus, Urine Random 11 mg/dL    Creatinine, Urine Random 125.9 20.0 - 320.0 mg/dL    Phosphorus/Creatinine Ratio 87 Not established. mg/g creat   Magnesium, Urine Random   Result  Value Ref Range    Magnesium, Urine Random 4.34 mg/dL    Creatinine, Urine Random 120.7 20.0 - 320.0 mg/dL    Magnesium/Creatinine Ratio 36.0 Not established. mg/g Creat   CBC   Result Value Ref Range    WBC 3.9 (L) 4.4 - 11.3 x10*3/uL    nRBC 0.0 0.0 - 0.0 /100 WBCs    RBC 3.58 (L) 4.00 - 5.20 x10*6/uL    Hemoglobin 11.9 (L) 12.0 - 16.0 g/dL    Hematocrit 34.8 (L) 36.0 - 46.0 %    MCV 97 80 - 100 fL    MCH 33.2 26.0 - 34.0 pg    MCHC 34.2 32.0 - 36.0 g/dL    RDW 13.3 11.5 - 14.5 %    Platelets 275 150 - 450 x10*3/uL   Renal function panel   Result Value Ref Range    Glucose 108 (H) 74 - 99 mg/dL    Sodium 137 136 - 145 mmol/L    Potassium 3.5 3.5 - 5.3 mmol/L    Chloride 101 98 - 107 mmol/L    Bicarbonate 25 21 - 32 mmol/L    Anion Gap 15 10 - 20 mmol/L    Urea Nitrogen 3 (L) 6 - 23 mg/dL    Creatinine 0.44 (L) 0.50 - 1.05 mg/dL    eGFR >90 >60 mL/min/1.73m*2    Calcium 9.2 8.6 - 10.6 mg/dL    Phosphorus 3.7 2.5 - 4.9 mg/dL    Albumin 3.9 3.4 - 5.0 g/dL   Magnesium   Result Value Ref Range    Magnesium 2.00 1.60 - 2.40 mg/dL   Coagulation Screen   Result Value Ref Range    Protime 10.4 9.8 - 12.8 seconds    INR 0.9 0.9 - 1.1    aPTT 29 27 - 38 seconds          ASSESSMENT:  Patient is a 43-year-old female with a past medical history significant for hypokalemia, hypomagnesemia chronically with unknown etiology currently on oral supplementation, asthma, anxiety, CRPS type I of LLE, hypertension (diagnosed after her first pregnancy at age 19) who presented to the emergency room due to concern for electrolyte imbalance.      #Hypokalemia  #Hypomagesemia  #hypophos  - No alkalosis/acidosis on VBG  - urine electrolyte studies difficult to interpret in the setting of high doses of electrolyte repletion yesterday, though given the speed at which her serum potassium improved following repletion it is unlikely she is experiencing any renal-driven potassium wasting. There is a long standing trend of hypophosphatemia starting  after she is fed during the first day of the admission suggesting a refeeding syndrome picture. Believe her electrolyte derangements are GI rather than renal in nature.     Recommendations:  - Follow up urine diuretic screening  - continue electrolyte supplementation  - recommend outpatient nephrology follow up when she is in a steady state to repeat VBG, BMP, and urine electrolytes including magnesium and potassium  - Agree with GI consultation    Nephrology will sign off at this time. Thank you for involving us in the care of this patient.    Justyna Montes De Oca MD  Nephrology Resident  24 hour Renal Pager - [phone]    Discussed with attending nephrologist

## 2024-05-09 NOTE — PROGRESS NOTES
05/09/24 1147   Discharge Planning   Living Arrangements Spouse/significant other;Children   Support Systems Spouse/significant other;Children   Assistance Needed Pending.   Type of Residence Private residence   Do you have animals or pets at home? No   Who is requesting discharge planning? Patient   Home or Post Acute Services   (Pending)   Patient expects to be discharged to: Pending therapy recommendations   Does the patient need discharge transport arranged? No  (Pt will call for an Uber.)   Financial Resource Strain   How hard is it for you to pay for the very basics like food, housing, medical care, and heating? Somewhat   Housing Stability   In the last 12 months, was there a time when you were not able to pay the mortgage or rent on time? Y   In the last 12 months, was there a time when you did not have a steady place to sleep or slept in a shelter (including now)? N   Transportation Needs   In the past 12 months, has lack of transportation kept you from medical appointments or from getting medications? yes   In the past 12 months, has lack of transportation kept you from meetings, work, or from getting things needed for daily living? Yes     Assessment Note:  Met with pt and introduced myself as care coordinator and member of the Care Transitions team for discharge planning.   Pt feels safe at home with support of family.  Pt uses Lyft or arranges for rides to drs appts (her car is not running).  Pt's address, phone number and contact information was verified. PT and OT evaluations were ordered.  Pt does not have any questions/concerns at this time.     Previous Home Care: None.  Pt was active with [facility] for PT and aqua therapy.  Pt states insurance ran out.  DME: Crutches (at bedside).  Pharmacy: [facility] on [address]  Falls: Denies  PCP:  Dr Saadia Snyder [facility] (last visit 12/2024).    Renae Sandoval MSN, RN-BC  Transitional Care Coordinator (TCC)  [phone]

## 2024-05-09 NOTE — PROGRESS NOTES
TCC discussed care team reccs for LOW intensity and pt agreeable, AOC is [facility]. Pt states she has a  PCP but would like a new  PCP appt prior to discharge closer to where she lives. TCC will make medical team aware.

## 2024-05-09 NOTE — CONSULTS
[facility]   [facility]  INITIAL CONSULT NOTE     Source of Information: The source of the history was patient     Consult requested by: Service: Medicine    Reason for Consult: Diarrhea     Admission Chief Complaint: Tremor      SUBJECTIVE     HPI: Sharla Ramirez is a 44 y.o. female w/PMH of chronic hypokalemia, hypomagnesemia presents for tremor admitted for evaluation for electrolytes abnormalities.   Patient had cholecystectomy in  and since then has been loose BM of up to 3 times/day. The amount of the stool depends on her intake. Goes bathroom 5-10 min after PO intake. No nocturnal symptoms. Prior to the surgery, she usually had 2~3 well foamed BM.   Patient was started on cholestyramine 4g BID during the admission and has not had BM since then. Denies family history of CRC.      On admission, K 2.6, Mg 1.36. Cryptosporidium, A1AT, Giardia, OP, electrolytes, stool pathogen were collected and results are pending.   ROS: Complete review of systems obtained, negative unless otherwise indicated above.     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     Past Surgical History:   Procedure Laterality Date     SECTION, LOW TRANSVERSE      CHOLECYSTECTOMY       Family History   Problem Relation Name Age of Onset    Hypertension Mother      Hypokalemia Mother      Cholelithiasis Mother      Constipation Mother      Bilateral breast cancer Mother       Social History     Social History Narrative    Not on file       Medications:  acetaminophen, 650 mg, oral, q6h  amLODIPine, 5 mg, oral, Daily  cholestyramine, 4 g, oral, BID  cloNIDine, 0.1 mg, oral, Daily before  "breakfast  DULoxetine, 60 mg, oral, Daily  famotidine, 20 mg, oral, Nightly  fluticasone, 1 spray, Each Nostril, Daily  fluticasone furoate-vilanteroL, 1 puff, inhalation, Daily  loratadine, 10 mg, oral, Daily  losartan, 100 mg, oral, Daily  potassium chloride CR, 20 mEq, oral, Daily  spironolactone, 25 mg, oral, Daily      PRN medications: albuterol, ondansetron ODT, oxyCODONE, tiZANidine      EXAM     Vital signs:      5/8/2024    10:59 AM 5/8/2024     2:18 PM 5/8/2024     4:55 PM 5/8/2024     5:06 PM 5/8/2024    11:40 PM 5/9/2024     2:41 AM 5/9/2024     8:02 AM   Vitals   Systolic 124 131  135 143 150 123   Diastolic 97 96  82 113 83 85   Heart Rate 77 84  83 80 79 80   Resp 18 16  18 14 20    Height (in)   1.676 m (5' 6\")       Weight (lb)   165       BMI   26.63 kg/m2       BSA (m2)   1.87 m2             Physical Exam   General: NAD, AA&O x 3  Eyes: EOMI, PERRLA  ENT: MMM  Heart: RRR  Lungs: No respiratory distress  Abdomen: Soft, non tender, non distended  Skin: No jaundice   Neuro: Appropriately responds to questions/commands       DATA                                                                            Labs     Results for orders placed or performed during the hospital encounter of 05/07/24 (from the past 24 hour(s))   Renal function panel   Result Value Ref Range    Glucose 110 (H) 74 - 99 mg/dL    Sodium 137 136 - 145 mmol/L    Potassium 3.7 3.5 - 5.3 mmol/L    Chloride 105 98 - 107 mmol/L    Bicarbonate 22 21 - 32 mmol/L    Anion Gap 14 10 - 20 mmol/L    Urea Nitrogen 2 (L) 6 - 23 mg/dL    Creatinine 0.46 (L) 0.50 - 1.05 mg/dL    eGFR >90 >60 mL/min/1.73m*2    Calcium 8.6 8.6 - 10.6 mg/dL    Phosphorus 2.0 (L) 2.5 - 4.9 mg/dL    Albumin 4.0 3.4 - 5.0 g/dL   Magnesium   Result Value Ref Range    Magnesium 2.36 1.60 - 2.40 mg/dL   BLOOD GAS VENOUS FULL PANEL   Result Value Ref Range    POCT pH, Venous 7.43 7.33 - 7.43 pH    POCT pCO2, Venous 34 (L) 41 - 51 mm Hg    POCT pO2, Venous 83 (H) 35 - 45 mm " Hg    POCT SO2, Venous 98 (H) 45 - 75 %    POCT Oxy Hemoglobin, Venous 95.6 (H) 45.0 - 75.0 %    POCT Hematocrit Calculated, Venous 36.0 36.0 - 46.0 %    POCT Sodium, Venous 135 (L) 136 - 145 mmol/L    POCT Potassium, Venous 3.8 3.5 - 5.3 mmol/L    POCT Chloride, Venous 104 98 - 107 mmol/L    POCT Ionized Calicum, Venous 1.16 1.10 - 1.33 mmol/L    POCT Glucose, Venous 117 (H) 74 - 99 mg/dL    POCT Lactate, Venous 1.6 0.4 - 2.0 mmol/L    POCT Base Excess, Venous -1.2 -2.0 - 3.0 mmol/L    POCT HCO3 Calculated, Venous 22.6 22.0 - 26.0 mmol/L    POCT Hemoglobin, Venous 12.1 12.0 - 16.0 g/dL    POCT Anion Gap, Venous 12.0 10.0 - 25.0 mmol/L    Patient Temperature 37.0 degrees Celsius    FiO2 21 %   Creatinine, Urine Random   Result Value Ref Range    Creatinine, Urine Random 127.4 20.0 - 320.0 mg/dL   Urine electrolytes   Result Value Ref Range    Sodium, Urine Random 133 mmol/L    Sodium/Creatinine Ratio 106 Not established. mmol/g Creat    Potassium, Urine Random 34 mmol/L    Potassium/Creatinine Ratio 27 Not established mmol/g Creat    Chloride, Urine Random 140 mmol/L    Chloride/Creatinine Ratio 111 38 - 318 mmol/g creat    Creatinine, Urine Random 125.9 20.0 - 320.0 mg/dL   Phosphorus, Urine Random   Result Value Ref Range    Phosphorus, Urine Random 11 mg/dL    Creatinine, Urine Random 125.9 20.0 - 320.0 mg/dL    Phosphorus/Creatinine Ratio 87 Not established. mg/g creat   Magnesium, Urine Random   Result Value Ref Range    Magnesium, Urine Random 4.34 mg/dL    Creatinine, Urine Random 120.7 20.0 - 320.0 mg/dL    Magnesium/Creatinine Ratio 36.0 Not established. mg/g Creat   CBC   Result Value Ref Range    WBC 3.9 (L) 4.4 - 11.3 x10*3/uL    nRBC 0.0 0.0 - 0.0 /100 WBCs    RBC 3.58 (L) 4.00 - 5.20 x10*6/uL    Hemoglobin 11.9 (L) 12.0 - 16.0 g/dL    Hematocrit 34.8 (L) 36.0 - 46.0 %    MCV 97 80 - 100 fL    MCH 33.2 26.0 - 34.0 pg    MCHC 34.2 32.0 - 36.0 g/dL    RDW 13.3 11.5 - 14.5 %    Platelets 275 150 - 450  x10*3/uL   Renal function panel   Result Value Ref Range    Glucose 108 (H) 74 - 99 mg/dL    Sodium 137 136 - 145 mmol/L    Potassium 3.5 3.5 - 5.3 mmol/L    Chloride 101 98 - 107 mmol/L    Bicarbonate 25 21 - 32 mmol/L    Anion Gap 15 10 - 20 mmol/L    Urea Nitrogen 3 (L) 6 - 23 mg/dL    Creatinine 0.44 (L) 0.50 - 1.05 mg/dL    eGFR >90 >60 mL/min/1.73m*2    Calcium 9.2 8.6 - 10.6 mg/dL    Phosphorus 3.7 2.5 - 4.9 mg/dL    Albumin 3.9 3.4 - 5.0 g/dL   Magnesium   Result Value Ref Range    Magnesium 2.00 1.60 - 2.40 mg/dL   Coagulation Screen   Result Value Ref Range    Protime 10.4 9.8 - 12.8 seconds    INR 0.9 0.9 - 1.1    aPTT 29 27 - 38 seconds                                                                                Imaging           XR abdomen 1 view    Result Date: 5/8/2024  Interpreted By:  Efren Esteban, STUDY: Abdominal series dated 5/8/2024.   INDICATION: Abdominal pain.   COMPARISON: None.   ACCESSION NUMBER(S): [id number]   ORDERING CLINICIAN: BLAIRE OCASIO   TECHNIQUE: Two AP radiographs of the abdomen.   FINDINGS: There is a nonspecific nonobstructive bowel gas pattern.  Clips are seen over the right upper quadrant. Density over the pelvis likely represents a phlebolith.       Nonspecific nonobstructive bowel gas pattern. If there remains concern for abdominopelvic pathology, CT is recommended.   Signed by: Efren Esteban 5/8/2024 1:09 PM Dictation workstation:   [id number]    ECG 12 Lead    Result Date: 5/8/2024  Normal sinus rhythm Nonspecific T wave abnormality Abnormal ECG When compared with ECG of 22-FEB-2024 01:38, Nonspecific T wave abnormality, worse in Lateral leads See ED provider note for full interpretation and clinical correlation Confirmed by Evelyn Jimenez ([id number]) on 5/8/2024 5:30:32 AM                                                                              GI Procedures           ASSESSMENT / PLAN                  Assessment and Recommendations:     Sharla Ramirez  is a 44 y.o. female w/PMH of chronic hypokalemia, s/p cholecystectomy in 2022, hypomagnesemia presents for tremor admitted for evaluation for electrolytes abnormalities. GI was consulted for chronic diarrhea.    #Chronic diarrhea  #S/p cholecystectomy in 2022  Patient had cholecystectomy in 2022 and since then has been loose BM of up to 3 times/day. Goes bathroom 5-10 min after PO intake. No nocturnal symptoms. Prior to the surgery, she usually had 2~3 well foamed BM. TSH wnl 11/2023. Giving the timeline (since [facility]) and improvement of symptoms with bile acid binders, post cholecystectomy diarrhea is high on differential diagnosis. Other etiologies include chronic infections (OP, giardia), IBS. Stool studies are pending. No high risk feature for CRC screening.    Plan  -c/w cholestyramine 4g BID (can increase up 24g/day)  -follow cryptosporidium, A1AT, Giardia, OP, electrolytes, stool pathogen  -Pt can follow up with Yumiko Vance (GI) as outpatient     SALVADOR per primary team.   ------------------------------------------------------------------------  Ana Almanza MD   Gastroenterology Fellow    After 5PM and on Weekends, please page on-call fellow. GI will sign off.     Case discussed with service attending Dr. Armijo   Final recommendations pending attending attestation.

## 2024-05-09 NOTE — PROGRESS NOTES
Physical Therapy    Physical Therapy Evaluation    Patient Name: Sharla Ramirez  MRN: [id number]  Today's Date: 5/9/2024   Time Calculation  Start Time: 1040  Stop Time: 1054  Time Calculation (min): 14 min    Assessment/Plan   PT Assessment  PT Assessment Results: Decreased strength, Decreased mobility, Pain, Decreased range of motion  Rehab Prognosis: Good  End of Session Communication: Bedside nurse  End of Session Patient Position: Bed, 3 rail up, Alarm off, not on at start of session  IP OR SWING BED PT PLAN  Inpatient or Swing Bed: Inpatient  PT Plan  Treatment/Interventions: Gait training, Stair training, Strengthening, Range of motion, Therapeutic exercise, Transfer training  PT Plan: Skilled PT  PT Frequency: 2 times per week  PT Discharge Recommendations: Low intensity level of continued care  PT Recommended Transfer Status: Assist x1  PT - OK to Discharge: Yes      Subjective   General Visit Information:  Reason for Referral: presenting for further work up of electrolyte abnormalities.  Past Medical History Relevant to Rehab: chronic hypokalemia, hypomagnesemia, asthma, anxiety, CRPS type 1 of LLE, and HTN  Prior to Session Communication: Bedside nurse  Patient Position Received: Bed, 3 rail up, Alarm off, not on at start of session   Home Living:  Home Living  Type of Home: Condo  Lives With: Significant other, Dependent children  Home Adaptive Equipment: Crutches  Home Layout: Two level  Home Access: Stairs to enter with rails  Entrance Stairs-Number of Steps: 1  Prior Level of Function:  Prior Function Per Pt/Caregiver Report  ADL Assistance: Needs assistance (fiance and dtr assist with ADLs)  Homemaking Assistance: Needs assistance  Ambulatory Assistance: Independent (has been using crutches since ankle/foot injury in October)  Precautions:  Precautions  Medical Precautions: Fall precautions       Objective     Pain:  Pain Assessment  Pain Assessment: 0-10  Pain Score: 10 - Worst possible pain  Pain  Location:  ([facility])  Cognition:  Cognition  Overall Cognitive Status: Within Functional Limits      Extremity/Trunk Assessments:  Strength:     RUE   RUE : Within Functional Limits  LUE   LUE: Within Functional Limits  RLE   RLE : Within Functional Limits  LLE   LLE : Exceptions to WFL  PROM LLE (degrees)  LLE PROM Comment: hip/knee WFL  L Ankle Dorsiflexion 0-20: 0 (with c/o pain)  Strength LLE  L Hip Flexion: 5/5  L Knee Extension:  (at least 3/5 (MMT NT due to pain))  L Ankle Dorsiflexion: 2-/5  L Ankle Plantar Flexion: 2-/5    General Assessments:     Sensation  Light Touch: No apparent deficits     Static Sitting Balance  Static Sitting-Level of Assistance: Independent  Dynamic Sitting Balance  Dynamic Sitting-Comments: independent  Static Standing Balance  Static Standing-Level of Assistance: Distant supervision (with crutches)  Dynamic Standing Balance  Dynamic Standing-Comments: SBA with crutches    Functional Assessments:  Bed Mobility  Bed Mobility: Yes  Bed Mobility 1  Bed Mobility 1: Supine to sitting, Sitting to supine  Level of Assistance 1: Modified independent  Bed Mobility Comments 1: HOB elevated  Transfers  Transfer: Yes  Transfer 1  Technique 1: Sit to stand, Stand to sit  Transfer Device 1: Crutches  Transfer Level of Assistance 1: Close supervision  Ambulation/Gait Training  Ambulation/Gait Training Performed: Yes  Ambulation/Gait Training 1  Device 1: Axillary crutches  Assistance 1: Close supervision  Quality of Gait 1: Antalgic, Decreased step length (decreased crispin)  Comments/Distance (ft) 1: 30 feet       Outcome Measures:  [facility] Basic Mobility  Turning from your back to your side while in a flat bed without using bedrails: None  Moving from lying on your back to sitting on the side of a flat bed without using bedrails: None  Moving to and from bed to chair (including a wheelchair): A little  Standing up from a chair using your arms (e.g. wheelchair or bedside chair): A little  To walk in  hospital room: A little  Climbing 3-5 steps with railing: A little  Basic Mobility - Total Score: 20             Encounter Problems       Encounter Problems (Active)       Mobility       STG - Patient will ambulate 150 feet with crutches modified independent        Start:  05/09/24    Expected End:  05/30/24            STG - Patient will ascend and descend a flight of stairs with LRD modified independent        Start:  05/09/24    Expected End:  05/30/24            pt will be independent with BLE HEP       Start:  05/09/24    Expected End:  05/30/24               PT Transfers       STG - Patient will transfer sit to and from stand with crutches modified independent        Start:  05/09/24    Expected End:  05/30/24                   Education Documentation  Precautions, taught by Bee Kunz, PT at 5/9/2024 11:28 AM.  Learner: Patient  Readiness: Acceptance  Method: Explanation  Response: Verbalizes Understanding    Mobility Training, taught by Bee Kunz, PT at 5/9/2024 11:28 AM.  Learner: Patient  Readiness: Acceptance  Method: Explanation  Response: Verbalizes Understanding    Education Comments  No comments found.            05/09/24 at 11:28 AM   Bee Kunz, PT

## 2024-05-09 NOTE — CARE PLAN
Problem: Pain  Goal: My pain/discomfort is manageable  Outcome: Progressing     Problem: Safety  Goal: Patient will be injury free during hospitalization  Outcome: Progressing  Goal: I will remain free of falls  Outcome: Progressing   The patient's goals for the shift include      The clinical goals for the shift include Normal electrolyte values

## 2024-05-09 NOTE — CONSULTS
"Nutrition Initial Assessment:   Nutrition Assessment    Reason for Assessment: Provider consult order (enteral recs/nutrition assessment/educate patient)    Patient is a 44 y.o. female with PMH significant for hypokalemia, chornic hypomagnesemia with unknown entiology on oral supplementation, asthma, anxiety, CRPS type 1 of LLE, and HTN presenting with concern for electrolyte imbalance. Of note, pt has had baseline poor appetite and multiple daily episodes of nonbloody, yellow emesis and chronic diarrhea (3 watery, nonbloody, yellow BMs per day) since her cholecystectomy in 2022.       Nutrition History:  Energy Intake: Poor < 50 %  Food and Nutrient History: Pt eating a salad for lunch during visit. Pt reports she has had a decrased appetite for the past several months. Reports she has chronic yellow colored emesis and fatty loose stools since cholecystectomy. Reports she follows a low fat diet because higher fat foods trigger symptoms. Reports she eats lean meats such as chicken and fish and stays away from higher fat meats. Reports she does not use oils for cooking and uses an air fryer or bakes most meals. States she is able to consume 1 full meal daily and has small snacks throughout the day. Reports she has early satiety and feels like food is going to \"come right back up\". Reports she has heard of Ensure/Boost supplements but has not had them before. Is agreeable to trying Ensure Clear in berry flavor.  Food Allergies/Intolerances:  None  GI Symptoms: Diarrhea, Nausea, and Vomiting  Oral Problems: None       Anthropometrics:  Height: 167.6 cm (5' 6\")   Weight: 74.8 kg (165 lb)   BMI (Calculated): 26.64  IBW/kg (Dietitian Calculated): 59.1 kg  Percent of IBW: 127 %       Weight History:   Wt Readings from Last 10 Encounters:   05/08/24 74.8 kg (165 lb)   02/21/24 74.8 kg (165 lb)   02/19/24 74.8 kg (165 lb)   01/13/24 77.1 kg (170 lb)   12/08/23 88.9 kg (196 lb) - 15.9% loss   10/17/23 89.3 kg (196 lb 13.9 oz) "   04/07/22 72.6 kg (159 lb 15.8 oz)       Weight Change %:  Weight History / % Weight Change: Reports usual body wt of 175lb. Reports she has not weighed her current body wt since she was a teenager and is concerend about her wt loss.    Nutrition Focused Physical Exam Findings:    Subcutaneous Fat Loss:   Orbital Fat Pads: Mild-Moderate (slight dark circles and slight hollowing)  Buccal Fat Pads: Mild-Moderate (flat cheeks, minimal bounce)  Triceps: Mild-Moderate (less than ample fat tissue)  Muscle Wasting:  Temporalis: Mild-Moderate (slight depression)  Pectoralis (Clavicular Region): Well nourished (clavicle not visible)  Deltoid/Trapezius: Mild-Moderate (slight protrusion of acromion process)  Interosseous: Mild-Moderate (slightly depressed area between thumb and forefinger)  Quadriceps: Mild-Moderate (mild depression on inner and outer thigh)  Gastrocnemius: Mild-Moderate (not well developed muscle)  Edema:  Edema: none  Physical Findings:  Hair: Negative  Eyes: Negative  Mouth: Negative  Nails: Negative  Skin: Negative    Nutrition Significant Labs:  CBC Trend:   Results from last 7 days   Lab Units 05/09/24  0934 05/07/24  1802   WBC AUTO x10*3/uL 3.9* 5.7   RBC AUTO x10*6/uL 3.58* 3.60*   HEMOGLOBIN g/dL 11.9* 11.9*   HEMATOCRIT % 34.8* 32.3*   MCV fL 97 90   PLATELETS AUTO x10*3/uL 275 315    , BMP Trend:   Results from last 7 days   Lab Units 05/09/24  0935 05/08/24  1424 05/08/24  0516 05/07/24  1802   GLUCOSE mg/dL 108* 110*  --  100*   CALCIUM mg/dL 9.2 8.6  --  8.6   SODIUM mmol/L 137 137  --  139   POTASSIUM mmol/L 3.5 3.7   < > 2.6*   CO2 mmol/L 25 22  --  20*   CHLORIDE mmol/L 101 105  --  100   BUN mg/dL 3* 2*  --  7   CREATININE mg/dL 0.44* 0.46*  --  0.52    < > = values in this interval not displayed.   Renal Lab Trend:   Results from last 7 days   Lab Units 05/09/24  0935 05/08/24  1424 05/08/24  1027 05/08/24  0516 05/07/24  1802   POTASSIUM mmol/L 3.5 3.7  --  2.9* 2.6*   PHOSPHORUS mg/dL  3.7 2.0*  --  2.8 1.6*   SODIUM mmol/L 137 137  --   --  139   MAGNESIUM mg/dL 2.00 2.36   < > 1.64 1.36*   EGFR mL/min/1.73m*2 >90 >90  --   --  >90   BUN mg/dL 3* 2*  --   --  7   CREATININE mg/dL 0.44* 0.46*  --   --  0.52    < > = values in this interval not displayed.        Nutrition Specific Medications:  Scheduled medications  acetaminophen, 650 mg, oral, q6h  amLODIPine, 5 mg, oral, Daily  cholestyramine, 4 g, oral, BID  cloNIDine, 0.1 mg, oral, Daily before breakfast  DULoxetine, 60 mg, oral, Daily  famotidine, 20 mg, oral, Nightly  fluticasone, 1 spray, Each Nostril, Daily  fluticasone furoate-vilanteroL, 1 puff, inhalation, Daily  loratadine, 10 mg, oral, Daily  losartan, 100 mg, oral, Daily  [START ON 5/10/2024] potassium chloride CR, 20 mEq, oral, Daily  spironolactone, 25 mg, oral, Daily      I/O:   No BM documented since admission ;      Dietary Orders (From admission, onward)       Start     Ordered    05/08/24 1229  Adult diet Regular  Diet effective now        Question:  Diet type  Answer:  Regular    05/08/24 1230                     Estimated Needs:   Total Energy Estimated Needs (kCal): 1750 kCal  Method for Estimating Needs: 30kcal/kg (IBW 59.1kg)  Total Protein Estimated Needs (g):  (75+)  Method for Estimating Needs: 1.3+ g/kg IBW  Total Fluid Estimated Needs (mL):  (1mL/kcal or per team)           Nutrition Diagnosis   Malnutrition Diagnosis  Patient has Malnutrition Diagnosis: Yes  Diagnosis Status: New  Malnutrition Diagnosis: Severe malnutrition related to chronic disease or condition  As Evidenced by: patient meeting <75% of estimated energy needs for >1 month; 15.9% significant weight loss in 5 months; areas of moderate muscle and subcutaneous fat loss.            Nutrition Interventions/Recommendations         Nutrition Prescription:  Ensure Clear TID (240kcal, 8g protein each).  Recommend trial Creon pancreatic enzymes.  Recommend multivitamin with minerals.  Consider nutrition  support if PO intake continues to be suboptimal after addition of oral nutrition supplements.         Nutrition Interventions:   Interventions: Meals and snacks, Medical food supplement  Goal: consume >50% of estimated energy needs  Medical Food Supplement: Commercial beverage  Goal: consume Ensure Clear TID       Nutrition Education:   Education Documentation  Low Fat Diet Education, taught by Daly Osman RDN, PAM at 5/9/2024  2:02 PM.  Learner: Patient  Readiness: Acceptance  Method: Explanation  Response: Verbalizes Understanding    Talked to pt about taking pancreatic enzymes particularly lipase for loose oily stools. Reviewed high fat foods such as fried foods, high fat meats, and oils. Recommended pt limit high fat foods. Recommend pt try ensure clear d/t 0g fat content.       Nutrition Monitoring and Evaluation   Food/Nutrient Related History Monitoring  Monitoring and Evaluation Plan: Energy intake, Amount of food  Criteria: meet >75% of estimated energy needs  Amount of Food: Medical food intake  Criteria: consume 100% ONS    Body Composition/Growth/Weight History  Monitoring and Evaluation Plan: Weight  Criteria: stable weight    Biochemical Data, Medical Tests and Procedures  Monitoring and Evaluation Plan: Electrolyte/renal panel, Glucose/endocrine profile  Criteria: labs WNL            Time Spent/Follow-up Reminder:   Time Spent (min): 60 minutes  Last Date of Nutrition Visit: 05/09/24  Nutrition Follow-Up Needed?: Dietitian to reassess per policy

## 2024-05-09 NOTE — PROGRESS NOTES
"Sharla Ramirez is a 44 y.o. female on day 1 of admission presenting with Hypokalemia.    Subjective   Pt is resting comfortably in bed this morning. Overnight, her phosphorus was decreased to 2; repleted. She denies any episodes of vomiting or diarrhea since presenting to the ED yesterday.  Denies SOB, CP, ABD pain, nausea, vomiting, diarrhea, tremors, and lightheadedness/dizziness.         Objective     Physical Exam  Constitutional:       General: She is not in acute distress.     Appearance: Normal appearance.   HENT:      Head: Normocephalic and atraumatic.   Cardiovascular:      Rate and Rhythm: Normal rate and regular rhythm.   Pulmonary:      Effort: Pulmonary effort is normal.      Breath sounds: Normal breath sounds.   Abdominal:      General: Abdomen is flat. Bowel sounds are normal. There is no distension.      Palpations: Abdomen is soft.      Tenderness: There is no abdominal tenderness. There is no guarding or rebound.   Musculoskeletal:      Right lower leg: No edema.      Left lower leg: No edema.   Skin:     General: Skin is warm and dry.      Capillary Refill: Capillary refill takes less than 2 seconds.   Neurological:      General: No focal deficit present.      Mental Status: She is alert.   Psychiatric:         Mood and Affect: Mood normal.         Behavior: Behavior normal.       Last Recorded Vitals  Blood pressure 123/85, pulse 80, temperature 36.2 °C (97.2 °F), temperature source Temporal, resp. rate 20, height 1.676 m (5' 6\"), weight 74.8 kg (165 lb), last menstrual period 04/10/2024, SpO2 96%.  Intake/Output last 3 Shifts:  I/O last 3 completed shifts:  In: 750 (10 mL/kg) [I.V.:100 (1.3 mL/kg); IV Piggyback:650]  Out: - (0 mL/kg)   Weight: 74.8 kg     Relevant Results          Scheduled medications  acetaminophen, 650 mg, oral, q6h  amLODIPine, 5 mg, oral, Daily  cholestyramine, 4 g, oral, BID  cloNIDine, 0.1 mg, oral, Daily before breakfast  DULoxetine, 60 mg, oral, Daily  famotidine, " 20 mg, oral, Nightly  fluticasone, 1 spray, Each Nostril, Daily  fluticasone furoate-vilanteroL, 1 puff, inhalation, Daily  loratadine, 10 mg, oral, Daily  losartan, 100 mg, oral, Daily  [START ON 5/10/2024] potassium chloride CR, 20 mEq, oral, Daily  spironolactone, 25 mg, oral, Daily      Continuous medications     PRN medications  PRN medications: albuterol, ondansetron ODT, oxyCODONE, tiZANidine  Results for orders placed or performed during the hospital encounter of 05/07/24 (from the past 24 hour(s))   Creatinine, Urine Random   Result Value Ref Range    Creatinine, Urine Random 127.4 20.0 - 320.0 mg/dL   Urine electrolytes   Result Value Ref Range    Sodium, Urine Random 133 mmol/L    Sodium/Creatinine Ratio 106 Not established. mmol/g Creat    Potassium, Urine Random 34 mmol/L    Potassium/Creatinine Ratio 27 Not established mmol/g Creat    Chloride, Urine Random 140 mmol/L    Chloride/Creatinine Ratio 111 38 - 318 mmol/g creat    Creatinine, Urine Random 125.9 20.0 - 320.0 mg/dL   Phosphorus, Urine Random   Result Value Ref Range    Phosphorus, Urine Random 11 mg/dL    Creatinine, Urine Random 125.9 20.0 - 320.0 mg/dL    Phosphorus/Creatinine Ratio 87 Not established. mg/g creat   Magnesium, Urine Random   Result Value Ref Range    Magnesium, Urine Random 4.34 mg/dL    Creatinine, Urine Random 120.7 20.0 - 320.0 mg/dL    Magnesium/Creatinine Ratio 36.0 Not established. mg/g Creat   CBC   Result Value Ref Range    WBC 3.9 (L) 4.4 - 11.3 x10*3/uL    nRBC 0.0 0.0 - 0.0 /100 WBCs    RBC 3.58 (L) 4.00 - 5.20 x10*6/uL    Hemoglobin 11.9 (L) 12.0 - 16.0 g/dL    Hematocrit 34.8 (L) 36.0 - 46.0 %    MCV 97 80 - 100 fL    MCH 33.2 26.0 - 34.0 pg    MCHC 34.2 32.0 - 36.0 g/dL    RDW 13.3 11.5 - 14.5 %    Platelets 275 150 - 450 x10*3/uL   Renal function panel   Result Value Ref Range    Glucose 108 (H) 74 - 99 mg/dL    Sodium 137 136 - 145 mmol/L    Potassium 3.5 3.5 - 5.3 mmol/L    Chloride 101 98 - 107 mmol/L     Bicarbonate 25 21 - 32 mmol/L    Anion Gap 15 10 - 20 mmol/L    Urea Nitrogen 3 (L) 6 - 23 mg/dL    Creatinine 0.44 (L) 0.50 - 1.05 mg/dL    eGFR >90 >60 mL/min/1.73m*2    Calcium 9.2 8.6 - 10.6 mg/dL    Phosphorus 3.7 2.5 - 4.9 mg/dL    Albumin 3.9 3.4 - 5.0 g/dL   Magnesium   Result Value Ref Range    Magnesium 2.00 1.60 - 2.40 mg/dL   Coagulation Screen   Result Value Ref Range    Protime 10.4 9.8 - 12.8 seconds    INR 0.9 0.9 - 1.1    aPTT 29 27 - 38 seconds     XR abdomen 1 view    Result Date: 5/8/2024  Interpreted By:  Efren Esteban, STUDY: Abdominal series dated 5/8/2024.   INDICATION: Abdominal pain.   COMPARISON: None.   ACCESSION NUMBER(S): [id number]   ORDERING CLINICIAN: BLAIRE OCASIO   TECHNIQUE: Two AP radiographs of the abdomen.   FINDINGS: There is a nonspecific nonobstructive bowel gas pattern.  Clips are seen over the right upper quadrant. Density over the pelvis likely represents a phlebolith.       Nonspecific nonobstructive bowel gas pattern. If there remains concern for abdominopelvic pathology, CT is recommended.   Signed by: Efren Esteban 5/8/2024 1:09 PM Dictation workstation:   [id number]    ECG 12 Lead    Result Date: 5/8/2024  Normal sinus rhythm Nonspecific T wave abnormality Abnormal ECG When compared with ECG of 22-FEB-2024 01:38, Nonspecific T wave abnormality, worse in Lateral leads See ED provider note for full interpretation and clinical correlation Confirmed by Evelyn Jimenez ([id number]) on 5/8/2024 5:30:32 AM          Malnutrition Diagnosis Status: New  Malnutrition Diagnosis: Severe malnutrition related to chronic disease or condition  As Evidenced by: patient meeting <75% of estimated energy needs for >1 month; 15.9% significant weight loss in 5 months; areas of moderate muscle and subcutaneous fat loss.  I agree with the dietitian's malnutrition diagnosis.      Assessment/Plan   Principal Problem:    Hypokalemia    Sharla Ramirez is a 43 y/o F with a PMH significant  for HTN, chronic hypokalemia, hypomagnesemia, asthma, anxiety, and CPRS type 1 of the LLE presenting for further workup of electrolyte abnormalities. She is currently stable. Most likely etiology would be renal loss of electrolytes vs GI. Ongoing diarrhea workup with stool electrolytes, O&P and stool pathogen panel. Nephrology following for renal workup, including urine diuretic screen, urine creatnine and phosphorus, VBG, and BMP. H/o cholecystectomy in 2022, after which pt states most of her sx started; possible bile acid diarrhea component. GI consulted 5/9.      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 d/t h/o (HTN, low aldosterone, low renin, high bicarb, low K), referred to nephrology but never followed up   - Pt currently asymptomatic after electrolyte repletion in the ED  - Nephrology consulted; recommend testing urine creatinine, phosphorus, VBG with repeat BMP, and urine diuretic screen d/t concern for diuretic abuse  - Urine studies collected; in progress  - Restart home spironolactone and losartan   - Continue home potassium chloride daily, dissolved in water for easier digestion, and home magnesium oxide BID   - Continue BID RFP and Magnesium      #Chronic watery diarrhea  :: 3+ nonbloody, watery stools daily; not associated with dizziness.   :: Hemodynamically normal and chloride wnl this admission  :: Negative travel history   :: Nonobstructive bowel gas pattern on XR ABD 5/8  - Stool O&P + Giardia/Cryptosporidium antigen, stool pathogen panel, and stool electrolytes; in progress  - Started cholestyramine for possible bile acid diarrhea component  - Continue BID RFP and Magnesium   - GI consulted, appreciate recs   - Dietician consulted for malnutrition d/t poor appetite and chronic vomiting and diarrhea  - Start PO nutritional supplements      #Chronic vomiting   :: Baseline poor appetite and  several episodes of nonbloody emesis daily   :: 2 episodes of nonbloody emesis before presention to the ED  - No emesis so far on admission   - Dietician consulted for malnutrition d/t poor appetite and chronic vomiting and diarrhea  - Start PO nutritional supplements   - Continue Zofran Q6 hrs PRN      #CPRS of LLE  - Reports chronic pain from past work injury in LLE, with similar neuropathic symptoms in the RLE   -Continue on home Duloxetine daily and PO oxycodone 5 mg PRN     #Asthma  - Stable on RA; only uses PRN albuterol periodically when weather changes  - Continue home Advair daily and albuterol PRN      #Allergic Rhinitis    - Currently asymptomatic  - Continue home Flonase and Zyrtec daily          Fluids: -   VTE ppx: Ambulation, SCDs  Diet: Adult regular  Bowel regimen: -   PPX: Famotidine 20 mg  Code status: Full code  Contact: Brayan Shetty: [phone]  Dispo: FITO Blackwell, MS4

## 2024-05-09 NOTE — CARE PLAN
The patient's goals for the shift include      The clinical goals for the shift include Patient will have no c/o of pain or rate pain <4 this shift

## 2024-05-10 ENCOUNTER — DOCUMENTATION (OUTPATIENT)
Dept: HOME HEALTH SERVICES | Facility: HOME HEALTH | Age: 44
End: 2024-05-10
Payer: COMMERCIAL

## 2024-05-10 ENCOUNTER — HOME HEALTH ADMISSION (OUTPATIENT)
Dept: HOME HEALTH SERVICES | Facility: HOME HEALTH | Age: 44
End: 2024-05-10
Payer: COMMERCIAL

## 2024-05-10 VITALS
HEIGHT: 66 IN | HEART RATE: 101 BPM | SYSTOLIC BLOOD PRESSURE: 140 MMHG | RESPIRATION RATE: 18 BRPM | TEMPERATURE: 97.9 F | OXYGEN SATURATION: 95 % | BODY MASS INDEX: 26.52 KG/M2 | WEIGHT: 165 LBS | DIASTOLIC BLOOD PRESSURE: 95 MMHG

## 2024-05-10 LAB
A1AT STL-MCNT: 0.32 MG/G (ref 0–0.5)
ALBUMIN SERPL BCP-MCNC: 3.8 G/DL (ref 3.4–5)
ANION GAP SERPL CALC-SCNC: 12 MMOL/L (ref 10–20)
BUN SERPL-MCNC: 8 MG/DL (ref 6–23)
CALCIUM SERPL-MCNC: 9.2 MG/DL (ref 8.6–10.6)
CHLORIDE SERPL-SCNC: 104 MMOL/L (ref 98–107)
CHLORIDE STL-SCNC: NORMAL MMOL/L
CO2 SERPL-SCNC: 23 MMOL/L (ref 21–32)
CREAT SERPL-MCNC: 0.48 MG/DL (ref 0.5–1.05)
EGFRCR SERPLBLD CKD-EPI 2021: >90 ML/MIN/1.73M*2
GLUCOSE SERPL-MCNC: 87 MG/DL (ref 74–99)
MAGNESIUM SERPL-MCNC: 2.14 MG/DL (ref 1.6–2.4)
PHOSPHATE SERPL-MCNC: 4 MG/DL (ref 2.5–4.9)
POTASSIUM SERPL-SCNC: 4.6 MMOL/L (ref 3.5–5.3)
POTASSIUM STL-SCNC: NORMAL MMOL/L
SODIUM SERPL-SCNC: 134 MMOL/L (ref 136–145)
SODIUM STL-SCNC: NORMAL MMOL/L

## 2024-05-10 PROCEDURE — 2500000004 HC RX 250 GENERAL PHARMACY W/ HCPCS (ALT 636 FOR OP/ED): Performed by: STUDENT IN AN ORGANIZED HEALTH CARE EDUCATION/TRAINING PROGRAM

## 2024-05-10 PROCEDURE — 2500000006 HC RX 250 W HCPCS SELF ADMINISTERED DRUGS (ALT 637 FOR ALL PAYERS): Performed by: STUDENT IN AN ORGANIZED HEALTH CARE EDUCATION/TRAINING PROGRAM

## 2024-05-10 PROCEDURE — 2500000006 HC RX 250 W HCPCS SELF ADMINISTERED DRUGS (ALT 637 FOR ALL PAYERS)

## 2024-05-10 PROCEDURE — 36415 COLL VENOUS BLD VENIPUNCTURE: CPT | Performed by: STUDENT IN AN ORGANIZED HEALTH CARE EDUCATION/TRAINING PROGRAM

## 2024-05-10 PROCEDURE — 80069 RENAL FUNCTION PANEL: CPT | Performed by: STUDENT IN AN ORGANIZED HEALTH CARE EDUCATION/TRAINING PROGRAM

## 2024-05-10 PROCEDURE — 83735 ASSAY OF MAGNESIUM: CPT | Performed by: STUDENT IN AN ORGANIZED HEALTH CARE EDUCATION/TRAINING PROGRAM

## 2024-05-10 PROCEDURE — 94640 AIRWAY INHALATION TREATMENT: CPT

## 2024-05-10 PROCEDURE — 2500000001 HC RX 250 WO HCPCS SELF ADMINISTERED DRUGS (ALT 637 FOR MEDICARE OP): Performed by: STUDENT IN AN ORGANIZED HEALTH CARE EDUCATION/TRAINING PROGRAM

## 2024-05-10 PROCEDURE — 99239 HOSP IP/OBS DSCHRG MGMT >30: CPT | Performed by: INTERNAL MEDICINE

## 2024-05-10 RX ORDER — MULTIVIT-MIN/IRON FUM/FOLIC AC 7.5 MG-4
1 TABLET ORAL DAILY
Qty: 30 TABLET | Refills: 0 | Status: SHIPPED | OUTPATIENT
Start: 2024-05-10 | End: 2024-06-09

## 2024-05-10 RX ORDER — MULTIVIT-MIN/IRON FUM/FOLIC AC 7.5 MG-4
1 TABLET ORAL DAILY
Status: DISCONTINUED | OUTPATIENT
Start: 2024-05-10 | End: 2024-05-10 | Stop reason: HOSPADM

## 2024-05-10 RX ORDER — OXYCODONE HYDROCHLORIDE 5 MG/1
5 TABLET ORAL EVERY 6 HOURS PRN
Qty: 7 TABLET | Refills: 0 | Status: SHIPPED | OUTPATIENT
Start: 2024-05-10 | End: 2024-05-13

## 2024-05-10 RX ORDER — CHOLESTYRAMINE 4 G/9G
8 POWDER, FOR SUSPENSION ORAL 3 TIMES DAILY
Qty: 180 PACKET | Refills: 0 | Status: SHIPPED | OUTPATIENT
Start: 2024-05-10 | End: 2024-06-09

## 2024-05-10 RX ORDER — ONDANSETRON 4 MG/1
4 TABLET, ORALLY DISINTEGRATING ORAL EVERY 8 HOURS PRN
Qty: 20 TABLET | Refills: 1 | Status: SHIPPED | OUTPATIENT
Start: 2024-05-10 | End: 2024-06-09

## 2024-05-10 RX ORDER — CLONIDINE HYDROCHLORIDE 0.1 MG/1
0.1 TABLET ORAL DAILY
COMMUNITY

## 2024-05-10 RX ORDER — CHOLESTYRAMINE 4 G/9G
8 POWDER, FOR SUSPENSION ORAL 3 TIMES DAILY
Status: DISCONTINUED | OUTPATIENT
Start: 2024-05-10 | End: 2024-05-10 | Stop reason: HOSPADM

## 2024-05-10 RX ADMIN — POTASSIUM CHLORIDE 20 MEQ: 1500 TABLET, EXTENDED RELEASE ORAL at 10:45

## 2024-05-10 RX ADMIN — LOSARTAN POTASSIUM 100 MG: 100 TABLET, FILM COATED ORAL at 10:45

## 2024-05-10 RX ADMIN — OXYCODONE HYDROCHLORIDE 5 MG: 5 TABLET ORAL at 03:31

## 2024-05-10 RX ADMIN — AMLODIPINE BESYLATE 5 MG: 5 TABLET ORAL at 10:45

## 2024-05-10 RX ADMIN — SPIRONOLACTONE 25 MG: 25 TABLET, FILM COATED ORAL at 10:45

## 2024-05-10 RX ADMIN — ACETAMINOPHEN 650 MG: 325 TABLET ORAL at 10:45

## 2024-05-10 RX ADMIN — POTASSIUM CHLORIDE 40 MEQ: 1500 TABLET, EXTENDED RELEASE ORAL at 03:31

## 2024-05-10 RX ADMIN — CHOLESTYRAMINE 8 G: 4 POWDER, FOR SUSPENSION ORAL at 10:44

## 2024-05-10 RX ADMIN — FLUTICASONE FUROATE AND VILANTEROL TRIFENATATE 1 PUFF: 100; 25 POWDER RESPIRATORY (INHALATION) at 11:35

## 2024-05-10 RX ADMIN — ACETAMINOPHEN 650 MG: 325 TABLET ORAL at 03:31

## 2024-05-10 RX ADMIN — DULOXETINE HYDROCHLORIDE 60 MG: 60 CAPSULE, DELAYED RELEASE ORAL at 05:37

## 2024-05-10 RX ADMIN — OXYCODONE HYDROCHLORIDE 5 MG: 5 TABLET ORAL at 10:45

## 2024-05-10 RX ADMIN — FLUTICASONE PROPIONATE 1 SPRAY: 50 SPRAY, METERED NASAL at 09:00

## 2024-05-10 RX ADMIN — LORATADINE 10 MG: 10 TABLET ORAL at 10:45

## 2024-05-10 RX ADMIN — CLONIDINE HYDROCHLORIDE 0.1 MG: 0.1 TABLET ORAL at 05:37

## 2024-05-10 RX ADMIN — POTASSIUM CHLORIDE 40 MEQ: 1500 TABLET, EXTENDED RELEASE ORAL at 01:21

## 2024-05-10 ASSESSMENT — COGNITIVE AND FUNCTIONAL STATUS - GENERAL
MOBILITY SCORE: 24
DAILY ACTIVITIY SCORE: 24

## 2024-05-10 ASSESSMENT — PAIN SCALES - GENERAL
PAINLEVEL_OUTOF10: 10 - WORST POSSIBLE PAIN
PAINLEVEL_OUTOF10: 10 - WORST POSSIBLE PAIN

## 2024-05-10 ASSESSMENT — PAIN - FUNCTIONAL ASSESSMENT
PAIN_FUNCTIONAL_ASSESSMENT: 0-10
PAIN_FUNCTIONAL_ASSESSMENT: 0-10

## 2024-05-10 NOTE — HH CARE COORDINATION
Home Care received a Referral for Physical Therapy and Occupational Therapy. We have processed the referral for a Start of Care on 24-48 HOURS .     If you have any questions or concerns, please feel free to contact us at [phone]. Follow the prompts, enter your five digit zip code, and you will be directed to your care team on [facility] 1.

## 2024-05-10 NOTE — DISCHARGE SUMMARY
Discharge Diagnosis  Hypokalemia    Issues Requiring Follow-Up  Post-cholecystectomy diarrhea   Hypokalemia    Test Results Pending At Discharge  Pending Labs       Order Current Status    Green Top Collected (05/07/24 1803)    Stool Pathogen Panel, PCR Collected (05/09/24 0026)    Alpha-1-antitrypsin, stool In process    Cryptosporidium Antigen, Stool In process    Electrolytes, stool In process    Extra Tubes In process    Giardia Antigen In process    Ova and Parasite Examination In process    Ova/Para + Giardia/Cryptosporidium Antigen In process    diuretic screen urine; [facility]; 27841 - Miscellaneous Test In process            Hospital Course  Sharla Ramirez is a 44 y.o. female w/ PMH significant for chronic hypokalemia, hypomagnesemia, asthma, anxiety, CRPS type 1 of LLE, and HTN who presented for further work up of electrolyte abnormalities.  She had a cholecystectomy in 2022 for gallstones, and has had 3-4 daily episodes of watery diarrhea since that time as well as nausea/non-bloody vomiting several days a week.  She denied bloody stools or melena.  She had been seen in the emergency department several times for hypomagnesemia and hypokalemia and was previously admitted in February 2023 for hypokalemia. She had been on losartan and spironolactone to try and mediate the electrolyte losses.  She also has a notable history of hypertension which was diagnosed at 19 years old.      She was evaluated in 2023 for this diarrhea by the gastroenterology team, however no etiology was determined at that time.      Patient has been admitted to the hospital 8 times for low potassium/ magnesium since her cholecystectomy.     Nephrology was formally consulted 5/8. Ordered urine studies and diuretic screen to evaluate for renal electrolyte losses and possible diuretic abuse; results pending at time of discharge and to be followed up outpt. Primary team started low dose bile-acid binder 5/8 PM d/t concern for bile acid  diarrhea. Upon arrival to the floor, pt afebrile and hemodynamically stable. She has remained without diarrhea and vomiting this admission and tolerated PO meals well. GI was formally consulted 5/9 AM. Stool studies pending at time of discharge; results to be followed up outpt. Determined most likely dx to be post-cholecystectomy diarrhea and recommended increased dose of daily bile acid binder. Nephrology agreed with this assessment 5/9 and recommended nephrology outpatient follow up and repeat outpatient labs in 1 week.       Pertinent Physical Exam At Time of Discharge  Physical Exam  Constitutional:       General: She is not in acute distress.     Appearance: Normal appearance.   HENT:      Head: Normocephalic and atraumatic.      Mouth/Throat:      Mouth: Mucous membranes are moist.   Eyes:      Extraocular Movements: Extraocular movements intact.      Pupils: Pupils are equal, round, and reactive to light.   Cardiovascular:      Rate and Rhythm: Normal rate and regular rhythm.      Heart sounds: Normal heart sounds.   Pulmonary:      Effort: Pulmonary effort is normal.      Breath sounds: Normal breath sounds.   Abdominal:      General: Abdomen is flat. There is no distension.      Palpations: Abdomen is soft.      Tenderness: There is no abdominal tenderness. There is no guarding or rebound.   Musculoskeletal:      Right lower leg: No edema.      Left lower leg: No edema.   Skin:     General: Skin is warm and dry.      Capillary Refill: Capillary refill takes less than 2 seconds.   Neurological:      General: No focal deficit present.      Mental Status: She is alert.   Psychiatric:         Mood and Affect: Mood normal.         Behavior: Behavior normal.         Home Medications     Medication List      START taking these medications     cholestyramine 4 gram packet; Commonly known as: Questran; Take 2   packets (8 g) by mouth 3 times a day.   multivitamin with minerals tablet; Take 1 tablet by mouth once  daily.     CHANGE how you take these medications     ondansetron ODT 4 mg disintegrating tablet; Commonly known as:   Zofran-ODT; Take 1 tablet (4 mg) by mouth every 8 hours if needed for   nausea.; What changed: reasons to take this     CONTINUE taking these medications     albuterol 90 mcg/actuation inhaler   amLODIPine 5 mg tablet; Commonly known as: Norvasc; Take 1 tablet (5 mg)   by mouth once daily. Do not start before October 18, 2023.   blood pressure monitor kit; Commonly known as: Blood Pressure Kit; Check   your blood pressure as indicted daily prior to and after medications.   cetirizine 10 mg tablet; Commonly known as: ZyrTEC   cloNIDine 0.1 mg tablet; Commonly known as: Catapres   DULoxetine 60 mg DR capsule; Commonly known as: Cymbalta   famotidine 20 mg tablet; Commonly known as: Pepcid; Take 1 tablet (20   mg) by mouth once daily at bedtime.   fluticasone 50 mcg/actuation nasal spray; Commonly known as: Flonase   fluticasone propion-salmeteroL 100-50 mcg/dose diskus inhaler; Commonly   known as: Advair Diskus   losartan 100 mg tablet; Commonly known as: Cozaar   potassium chloride CR 20 mEq ER tablet; Commonly known as: Klor-Con M20   spironolactone 25 mg tablet; Commonly known as: Aldactone; Take 1 tablet   (25 mg) by mouth once daily.     STOP taking these medications     magnesium oxide 400 mg tablet; Commonly known as: Mag-Ox       Outpatient Follow-Up  No future appointments.    Melissa Blackwell, MS4

## 2024-05-10 NOTE — CARE PLAN
The patient's goals for the shift include      The clinical goals for the shift include Pt willr ate her pain a 5/10 or less by the end of this shift.

## 2024-05-10 NOTE — HOSPITAL COURSE
Sharla Ramirez is a 44 y.o. female w/ PMH significant for chronic hypokalemia, hypomagnesemia, asthma, anxiety, CRPS type 1 of LLE, and HTN who presented for further work up of electrolyte abnormalities.  She had a cholecystectomy in 2022 for gallstones, and has had 3-4 daily episodes of watery diarrhea since that time as well as nausea/non-bloody vomiting several days a week.  She denied bloody stools or melena.  She had been seen in the emergency department several times for hypomagnesemia and hypokalemia and was previously admitted in February 2023 for hypokalemia. She had been on losartan and spironolactone to try and mediate the electrolyte losses.  She also has a notable history of hypertension which was diagnosed at 19 years old.      She was evaluated in 2023 for this diarrhea by the gastroenterology team, however no etiology was determined at that time.      Patient has been admitted to the hospital 8 times for low potassium/ magnesium since her cholecystectomy.     Nephrology was formally consulted 5/8. Ordered urine studies and diuretic screen to evaluate for renal electrolyte losses and possible diuretic abuse; results pending at time of discharge and to be followed up outpt. Primary team started low dose bile-acid binder 5/8 PM d/t concern for bile acid diarrhea. Upon arrival to the floor, pt afebrile and hemodynamically stable. She has remained without diarrhea and vomiting this admission and tolerated PO meals well. GI was formally consulted 5/9 AM. Stool studies pending at time of discharge; results to be followed up outpt. Determined most likely dx to be post-cholecystectomy diarrhea and recommended increased dose of daily bile acid binder. Nephrology agreed with this assessment 5/9 and recommended nephrology outpatient follow up and repeat outpatient labs in 1 week.

## 2024-05-10 NOTE — NURSING NOTE
05/10/2024 Nursing Note: patient discharged to home with Family. RN discussed discharge instructions with patient. Patient verbalized understanding and copy of After care visit given to Patient.

## 2024-05-10 NOTE — PROGRESS NOTES
Per medically ready per medical team. [facility] confirmed SOC in 1-2 days. Medical team and pt's RN updated.

## 2024-05-11 LAB
CRYPTOSP AG STL QL IA: NEGATIVE
G LAMBLIA AG STL QL IA: NEGATIVE

## 2024-05-12 LAB — O+P STL MICRO: NEGATIVE

## 2024-05-13 ENCOUNTER — HOME CARE VISIT (OUTPATIENT)
Dept: HOME HEALTH SERVICES | Facility: HOME HEALTH | Age: 44
End: 2024-05-13
Payer: COMMERCIAL

## 2024-05-13 VITALS — DIASTOLIC BLOOD PRESSURE: 80 MMHG | HEART RATE: 99 BPM | SYSTOLIC BLOOD PRESSURE: 125 MMHG | OXYGEN SATURATION: 93 %

## 2024-05-13 PROCEDURE — G0151 HHCP-SERV OF PT,EA 15 MIN: HCPCS

## 2024-05-13 PROCEDURE — 0023 HH SOC

## 2024-05-13 ASSESSMENT — ENCOUNTER SYMPTOMS
PAIN LOCATION - PAIN FREQUENCY: CONSTANT
PAIN: 1
PERSON REPORTING PAIN: PATIENT
PAIN LOCATION - PAIN SEVERITY: 10/10
PAIN LOCATION: RIGHT LEG
PAIN LOCATION - PAIN FREQUENCY: CONSTANT
PAIN LOCATION - PAIN QUALITY: NUMBING
PAIN LOCATION - PAIN SEVERITY: 10/10
PAIN LOCATION - RELIEVING FACTORS: PAIN MEDS, HEAT
PAIN LOCATION - PAIN QUALITY: NUMBING
PAIN LOCATION: LEFT LEG

## 2024-05-13 ASSESSMENT — ACTIVITIES OF DAILY LIVING (ADL)
AMBULATION ASSISTANCE ON FLAT SURFACES: 1
AMBULATION_DISTANCE/DURATION_TOLERATED: 50
OASIS_M1830: 03
ENTERING_EXITING_HOME: NEEDS ASSISTANCE

## 2024-05-14 ENCOUNTER — HOME CARE VISIT (OUTPATIENT)
Dept: HOME HEALTH SERVICES | Facility: HOME HEALTH | Age: 44
End: 2024-05-14
Payer: COMMERCIAL

## 2024-05-14 PROCEDURE — G0152 HHCP-SERV OF OT,EA 15 MIN: HCPCS

## 2024-05-14 ASSESSMENT — ENCOUNTER SYMPTOMS
PAIN LOCATION - PAIN SEVERITY: 10/10
PAIN: 1
PAIN LOCATION - RELIEVING FACTORS: MEDICATION
PAIN SEVERITY GOAL: 5/10
PAIN LOCATION: RIGHT LEG
LOWEST PAIN SEVERITY IN PAST 24 HOURS: 10/10
PERSON REPORTING PAIN: PATIENT
HIGHEST PAIN SEVERITY IN PAST 24 HOURS: 10/10
PAIN LOCATION: LEFT LEG
PAIN LOCATION - PAIN QUALITY: THROB
PAIN LOCATION - PAIN QUALITY: THROB
PAIN LOCATION - RELIEVING FACTORS: MEDICATION
PAIN LOCATION - PAIN FREQUENCY: CONSTANT
PAIN LOCATION - PAIN SEVERITY: 10/10
PAIN LOCATION - PAIN FREQUENCY: CONSTANT

## 2024-05-14 ASSESSMENT — ACTIVITIES OF DAILY LIVING (ADL)
DRESSING_UB_CURRENT_FUNCTION: SUPERVISION
WASHING_LB_CURRENT_FUNCTION: MINIMUM ASSIST
DRESSING_LB_CURRENT_FUNCTION: SUPERVISION
WASHING_UPB_CURRENT_FUNCTION: MINIMUM ASSIST

## 2024-05-15 ENCOUNTER — HOME CARE VISIT (OUTPATIENT)
Dept: HOME HEALTH SERVICES | Facility: HOME HEALTH | Age: 44
End: 2024-05-15
Payer: COMMERCIAL

## 2024-05-15 PROCEDURE — G0157 HHC PT ASSISTANT EA 15: HCPCS | Mod: CQ

## 2024-05-15 SDOH — HEALTH STABILITY: PHYSICAL HEALTH: EXERCISE TYPE: LE HEP

## 2024-05-15 SDOH — HEALTH STABILITY: PHYSICAL HEALTH
EXERCISE COMMENTS: THERAPEUTIC EXERCISES 2 X 10    SUPINE:HEEL SLIDES, SAQ, SLR, HIP ABD   SITTING: LAQ  STANDING: MARCHING, KNEE FLEXION,KNEE FLEXION STRETCH ON 6 INCH STEP, CALF RAISES

## 2024-05-15 ASSESSMENT — ENCOUNTER SYMPTOMS
PAIN LOCATION - PAIN SEVERITY: 10/10
PAIN LOCATION - PAIN QUALITY: ACHE
PAIN SEVERITY GOAL: 2/10
PAIN: 1
PERSON REPORTING PAIN: PATIENT
SUBJECTIVE PAIN PROGRESSION: UNCHANGED
PAIN LOCATION - PAIN FREQUENCY: CONSTANT
HIGHEST PAIN SEVERITY IN PAST 24 HOURS: 10/10
MUSCLE WEAKNESS: 1
LOWEST PAIN SEVERITY IN PAST 24 HOURS: 7/10
PAIN LOCATION: LEFT LEG

## 2024-05-17 ENCOUNTER — HOME CARE VISIT (OUTPATIENT)
Dept: HOME HEALTH SERVICES | Facility: HOME HEALTH | Age: 44
End: 2024-05-17
Payer: COMMERCIAL

## 2024-05-20 ENCOUNTER — HOME CARE VISIT (OUTPATIENT)
Dept: HOME HEALTH SERVICES | Facility: HOME HEALTH | Age: 44
End: 2024-05-20
Payer: COMMERCIAL

## 2024-05-20 LAB — SCAN RESULT: NORMAL

## 2024-05-20 PROCEDURE — G0157 HHC PT ASSISTANT EA 15: HCPCS | Mod: CQ

## 2024-05-20 SDOH — HEALTH STABILITY: PHYSICAL HEALTH
EXERCISE COMMENTS: THERAPEUTIC EXERCISES 2 X 10    SUPINE: QS, HEEL SLIDES, SAQ, SLR, HIP ABD, DF, PF, ANKLE CIRCLES. AAROM FOR ANKLE EXERCISES.   SITTING: LAQ

## 2024-05-20 SDOH — HEALTH STABILITY: PHYSICAL HEALTH: EXERCISE TYPE: LE HEP

## 2024-05-20 ASSESSMENT — ENCOUNTER SYMPTOMS
PAIN: 1
LOWEST PAIN SEVERITY IN PAST 24 HOURS: 9/10
LIMITED RANGE OF MOTION: 1
SUBJECTIVE PAIN PROGRESSION: UNCHANGED
PAIN LOCATION: LEFT LEG
PAIN LOCATION - RELIEVING FACTORS: HEAT
PAIN LOCATION - PAIN SEVERITY: 10/10
PAIN LOCATION - PAIN FREQUENCY: CONSTANT
PAIN LOCATION: LEFT ANKLE
HIGHEST PAIN SEVERITY IN PAST 24 HOURS: 10/10
PAIN LOCATION - PAIN SEVERITY: 10/10
PERSON REPORTING PAIN: PATIENT
MUSCLE WEAKNESS: 1
PAIN LOCATION - RELIEVING FACTORS: HEAT
PAIN LOCATION - PAIN FREQUENCY: CONSTANT

## 2024-05-22 ENCOUNTER — HOME CARE VISIT (OUTPATIENT)
Dept: HOME HEALTH SERVICES | Facility: HOME HEALTH | Age: 44
End: 2024-05-22
Payer: COMMERCIAL

## 2024-05-22 PROCEDURE — G0157 HHC PT ASSISTANT EA 15: HCPCS | Mod: CQ

## 2024-05-22 SDOH — HEALTH STABILITY: PHYSICAL HEALTH: EXERCISE COMMENTS: THERAPEUTIC EXERCISES 2 X 10  SITTING:LAQ  SUPINE: QS, HEEL SLIDES, SAQ, SLR, HIP ABD

## 2024-05-22 SDOH — HEALTH STABILITY: PHYSICAL HEALTH: EXERCISE TYPE: LE HEP

## 2024-05-22 ASSESSMENT — ENCOUNTER SYMPTOMS
HIGHEST PAIN SEVERITY IN PAST 24 HOURS: 10/10
PAIN LOCATION - PAIN FREQUENCY: CONSTANT
PAIN LOCATION - PAIN QUALITY: ACHE
MUSCLE WEAKNESS: 1
PAIN LOCATION: LEFT LEG
PERSON REPORTING PAIN: PATIENT
PAIN LOCATION - PAIN SEVERITY: 10/10
LOWEST PAIN SEVERITY IN PAST 24 HOURS: 9/10
SUBJECTIVE PAIN PROGRESSION: UNCHANGED
LIMITED RANGE OF MOTION: 1
PAIN SEVERITY GOAL: 3/10
PAIN: 1

## 2024-05-24 ENCOUNTER — HOME CARE VISIT (OUTPATIENT)
Dept: HOME HEALTH SERVICES | Facility: HOME HEALTH | Age: 44
End: 2024-05-24
Payer: COMMERCIAL

## 2024-05-28 ENCOUNTER — HOME CARE VISIT (OUTPATIENT)
Dept: HOME HEALTH SERVICES | Facility: HOME HEALTH | Age: 44
End: 2024-05-28
Payer: COMMERCIAL

## 2024-05-30 ENCOUNTER — HOME CARE VISIT (OUTPATIENT)
Dept: HOME HEALTH SERVICES | Facility: HOME HEALTH | Age: 44
End: 2024-05-30
Payer: COMMERCIAL

## 2024-05-30 ENCOUNTER — APPOINTMENT (OUTPATIENT)
Dept: PRIMARY CARE | Facility: CLINIC | Age: 44
End: 2024-05-30
Payer: COMMERCIAL

## 2024-05-30 PROCEDURE — G0157 HHC PT ASSISTANT EA 15: HCPCS | Mod: CQ

## 2024-05-30 PROCEDURE — G0156 HHCP-SVS OF AIDE,EA 15 MIN: HCPCS

## 2024-05-30 SDOH — HEALTH STABILITY: PHYSICAL HEALTH: EXERCISE TYPE: LE HEP

## 2024-05-30 SDOH — HEALTH STABILITY: PHYSICAL HEALTH: EXERCISE COMMENTS: THERAPEUTIC EXERCISES 2 X 10    SITTING:LAQ    SUPINE: QS, HEEL SLIDES, SAQ, SLR, HIP ABD

## 2024-05-30 ASSESSMENT — ENCOUNTER SYMPTOMS
PAIN LOCATION - PAIN FREQUENCY: CONSTANT
PAIN SEVERITY GOAL: 3/10
PAIN LOCATION - PAIN SEVERITY: 8/10
PAIN LOCATION - RELIEVING FACTORS: HEAT
PAIN: 1
PAIN LOCATION: LEFT LEG
LOWEST PAIN SEVERITY IN PAST 24 HOURS: 8/10
HIGHEST PAIN SEVERITY IN PAST 24 HOURS: 10/10
PERSON REPORTING PAIN: PATIENT
SUBJECTIVE PAIN PROGRESSION: UNCHANGED

## 2024-05-31 ENCOUNTER — HOME CARE VISIT (OUTPATIENT)
Dept: HOME HEALTH SERVICES | Facility: HOME HEALTH | Age: 44
End: 2024-05-31
Payer: COMMERCIAL

## 2024-06-03 ENCOUNTER — HOME CARE VISIT (OUTPATIENT)
Dept: HOME HEALTH SERVICES | Facility: HOME HEALTH | Age: 44
End: 2024-06-03
Payer: COMMERCIAL

## 2024-06-05 ENCOUNTER — HOME CARE VISIT (OUTPATIENT)
Dept: HOME HEALTH SERVICES | Facility: HOME HEALTH | Age: 44
End: 2024-06-05
Payer: COMMERCIAL

## 2024-06-05 PROCEDURE — G0151 HHCP-SERV OF PT,EA 15 MIN: HCPCS

## 2024-06-05 ASSESSMENT — ENCOUNTER SYMPTOMS
PAIN LOCATION: LEFT FOOT
PERSON REPORTING PAIN: PATIENT
PAIN: 1
PAIN LOCATION - PAIN FREQUENCY: CONSTANT
SUBJECTIVE PAIN PROGRESSION: GRADUALLY WORSENING
PAIN LOCATION - RELIEVING FACTORS: PAIN MEDS

## 2024-06-07 ENCOUNTER — HOME CARE VISIT (OUTPATIENT)
Dept: HOME HEALTH SERVICES | Facility: HOME HEALTH | Age: 44
End: 2024-06-07
Payer: COMMERCIAL

## 2024-06-07 PROCEDURE — G0156 HHCP-SVS OF AIDE,EA 15 MIN: HCPCS

## 2024-06-08 ASSESSMENT — ACTIVITIES OF DAILY LIVING (ADL)
HOME_HEALTH_OASIS: 00
OASIS_M1830: 01

## 2024-06-10 ENCOUNTER — APPOINTMENT (OUTPATIENT)
Dept: GASTROENTEROLOGY | Facility: HOSPITAL | Age: 44
End: 2024-06-10
Payer: COMMERCIAL

## 2024-06-18 ENCOUNTER — HOSPITAL ENCOUNTER (EMERGENCY)
Facility: HOSPITAL | Age: 44
Discharge: HOME | End: 2024-06-19
Attending: EMERGENCY MEDICINE
Payer: COMMERCIAL

## 2024-06-18 ENCOUNTER — HOSPITAL ENCOUNTER (EMERGENCY)
Facility: HOSPITAL | Age: 44
Discharge: HOME | End: 2024-06-18
Attending: EMERGENCY MEDICINE
Payer: COMMERCIAL

## 2024-06-18 VITALS
SYSTOLIC BLOOD PRESSURE: 125 MMHG | WEIGHT: 157 LBS | TEMPERATURE: 99 F | DIASTOLIC BLOOD PRESSURE: 87 MMHG | OXYGEN SATURATION: 96 % | BODY MASS INDEX: 24.64 KG/M2 | RESPIRATION RATE: 25 BRPM | HEART RATE: 99 BPM | HEIGHT: 67 IN

## 2024-06-18 VITALS
SYSTOLIC BLOOD PRESSURE: 133 MMHG | RESPIRATION RATE: 16 BRPM | HEIGHT: 67 IN | HEART RATE: 105 BPM | TEMPERATURE: 99.3 F | OXYGEN SATURATION: 97 % | DIASTOLIC BLOOD PRESSURE: 96 MMHG | WEIGHT: 157 LBS | BODY MASS INDEX: 24.64 KG/M2

## 2024-06-18 DIAGNOSIS — G89.29 CHRONIC PAIN OF LEFT LOWER EXTREMITY: ICD-10-CM

## 2024-06-18 DIAGNOSIS — M62.838 MUSCLE SPASM: Primary | ICD-10-CM

## 2024-06-18 DIAGNOSIS — E87.6 HYPOKALEMIA: Primary | ICD-10-CM

## 2024-06-18 DIAGNOSIS — M79.605 CHRONIC PAIN OF LEFT LOWER EXTREMITY: ICD-10-CM

## 2024-06-18 DIAGNOSIS — E83.42 HYPOMAGNESEMIA: ICD-10-CM

## 2024-06-18 LAB
ALBUMIN SERPL BCP-MCNC: 4.3 G/DL (ref 3.4–5)
ALP SERPL-CCNC: 124 U/L (ref 33–110)
ALT SERPL W P-5'-P-CCNC: 107 U/L (ref 7–45)
ANION GAP SERPL CALC-SCNC: 25 MMOL/L (ref 10–20)
ANION GAP SERPL CALC-SCNC: 30 MMOL/L (ref 10–20)
AST SERPL W P-5'-P-CCNC: 202 U/L (ref 9–39)
BASOPHILS # BLD AUTO: 0.02 X10*3/UL (ref 0–0.1)
BASOPHILS # BLD AUTO: 0.02 X10*3/UL (ref 0–0.1)
BASOPHILS NFR BLD AUTO: 0.3 %
BASOPHILS NFR BLD AUTO: 0.4 %
BILIRUB SERPL-MCNC: 1.6 MG/DL (ref 0–1.2)
BUN SERPL-MCNC: 7 MG/DL (ref 6–23)
BUN SERPL-MCNC: 9 MG/DL (ref 6–23)
CALCIUM SERPL-MCNC: 9 MG/DL (ref 8.6–10.3)
CALCIUM SERPL-MCNC: 9.3 MG/DL (ref 8.6–10.3)
CHLORIDE SERPL-SCNC: 100 MMOL/L (ref 98–107)
CHLORIDE SERPL-SCNC: 97 MMOL/L (ref 98–107)
CO2 SERPL-SCNC: 16 MMOL/L (ref 21–32)
CO2 SERPL-SCNC: 17 MMOL/L (ref 21–32)
CREAT SERPL-MCNC: 0.76 MG/DL (ref 0.5–1.05)
CREAT SERPL-MCNC: 0.78 MG/DL (ref 0.5–1.05)
EGFRCR SERPLBLD CKD-EPI 2021: >90 ML/MIN/1.73M*2
EGFRCR SERPLBLD CKD-EPI 2021: >90 ML/MIN/1.73M*2
EOSINOPHIL # BLD AUTO: 0.01 X10*3/UL (ref 0–0.7)
EOSINOPHIL # BLD AUTO: 0.01 X10*3/UL (ref 0–0.7)
EOSINOPHIL NFR BLD AUTO: 0.1 %
EOSINOPHIL NFR BLD AUTO: 0.2 %
ERYTHROCYTE [DISTWIDTH] IN BLOOD BY AUTOMATED COUNT: 14.9 % (ref 11.5–14.5)
ERYTHROCYTE [DISTWIDTH] IN BLOOD BY AUTOMATED COUNT: 14.9 % (ref 11.5–14.5)
GLUCOSE SERPL-MCNC: 141 MG/DL (ref 74–99)
GLUCOSE SERPL-MCNC: 87 MG/DL (ref 74–99)
HCT VFR BLD AUTO: 35.7 % (ref 36–46)
HCT VFR BLD AUTO: 38.9 % (ref 36–46)
HGB BLD-MCNC: 12.3 G/DL (ref 12–16)
HGB BLD-MCNC: 13.5 G/DL (ref 12–16)
IMM GRANULOCYTES # BLD AUTO: 0.01 X10*3/UL (ref 0–0.7)
IMM GRANULOCYTES # BLD AUTO: 0.02 X10*3/UL (ref 0–0.7)
IMM GRANULOCYTES NFR BLD AUTO: 0.2 % (ref 0–0.9)
IMM GRANULOCYTES NFR BLD AUTO: 0.3 % (ref 0–0.9)
LYMPHOCYTES # BLD AUTO: 1.57 X10*3/UL (ref 1.2–4.8)
LYMPHOCYTES # BLD AUTO: 2.04 X10*3/UL (ref 1.2–4.8)
LYMPHOCYTES NFR BLD AUTO: 29.5 %
LYMPHOCYTES NFR BLD AUTO: 32.2 %
MAGNESIUM SERPL-MCNC: 1.3 MG/DL (ref 1.6–2.4)
MAGNESIUM SERPL-MCNC: 1.6 MG/DL (ref 1.6–2.4)
MCH RBC QN AUTO: 34.7 PG (ref 26–34)
MCH RBC QN AUTO: 34.9 PG (ref 26–34)
MCHC RBC AUTO-ENTMCNC: 34.5 G/DL (ref 32–36)
MCHC RBC AUTO-ENTMCNC: 34.7 G/DL (ref 32–36)
MCV RBC AUTO: 100 FL (ref 80–100)
MCV RBC AUTO: 101 FL (ref 80–100)
MONOCYTES # BLD AUTO: 0.51 X10*3/UL (ref 0.1–1)
MONOCYTES # BLD AUTO: 0.53 X10*3/UL (ref 0.1–1)
MONOCYTES NFR BLD AUTO: 10.5 %
MONOCYTES NFR BLD AUTO: 7.7 %
NEUTROPHILS # BLD AUTO: 2.75 X10*3/UL (ref 1.2–7.7)
NEUTROPHILS # BLD AUTO: 4.3 X10*3/UL (ref 1.2–7.7)
NEUTROPHILS NFR BLD AUTO: 56.5 %
NEUTROPHILS NFR BLD AUTO: 62.1 %
NRBC BLD-RTO: 0 /100 WBCS (ref 0–0)
NRBC BLD-RTO: 0 /100 WBCS (ref 0–0)
PLATELET # BLD AUTO: 203 X10*3/UL (ref 150–450)
PLATELET # BLD AUTO: 232 X10*3/UL (ref 150–450)
POTASSIUM SERPL-SCNC: 3.2 MMOL/L (ref 3.5–5.3)
POTASSIUM SERPL-SCNC: 3.7 MMOL/L (ref 3.5–5.3)
PROT SERPL-MCNC: 7.3 G/DL (ref 6.4–8.2)
RBC # BLD AUTO: 3.52 X10*6/UL (ref 4–5.2)
RBC # BLD AUTO: 3.89 X10*6/UL (ref 4–5.2)
SODIUM SERPL-SCNC: 138 MMOL/L (ref 136–145)
SODIUM SERPL-SCNC: 140 MMOL/L (ref 136–145)
WBC # BLD AUTO: 4.9 X10*3/UL (ref 4.4–11.3)
WBC # BLD AUTO: 6.9 X10*3/UL (ref 4.4–11.3)

## 2024-06-18 PROCEDURE — 96375 TX/PRO/DX INJ NEW DRUG ADDON: CPT

## 2024-06-18 PROCEDURE — 96365 THER/PROPH/DIAG IV INF INIT: CPT | Mod: 59

## 2024-06-18 PROCEDURE — 99284 EMERGENCY DEPT VISIT MOD MDM: CPT | Mod: 25,27

## 2024-06-18 PROCEDURE — 36415 COLL VENOUS BLD VENIPUNCTURE: CPT | Mod: 59 | Performed by: EMERGENCY MEDICINE

## 2024-06-18 PROCEDURE — 80048 BASIC METABOLIC PNL TOTAL CA: CPT | Performed by: EMERGENCY MEDICINE

## 2024-06-18 PROCEDURE — 2500000004 HC RX 250 GENERAL PHARMACY W/ HCPCS (ALT 636 FOR OP/ED): Performed by: EMERGENCY MEDICINE

## 2024-06-18 PROCEDURE — 83735 ASSAY OF MAGNESIUM: CPT | Performed by: EMERGENCY MEDICINE

## 2024-06-18 PROCEDURE — 99284 EMERGENCY DEPT VISIT MOD MDM: CPT | Mod: 25

## 2024-06-18 PROCEDURE — 36415 COLL VENOUS BLD VENIPUNCTURE: CPT | Performed by: EMERGENCY MEDICINE

## 2024-06-18 PROCEDURE — 2500000004 HC RX 250 GENERAL PHARMACY W/ HCPCS (ALT 636 FOR OP/ED): Performed by: STUDENT IN AN ORGANIZED HEALTH CARE EDUCATION/TRAINING PROGRAM

## 2024-06-18 PROCEDURE — 2500000002 HC RX 250 W HCPCS SELF ADMINISTERED DRUGS (ALT 637 FOR MEDICARE OP, ALT 636 FOR OP/ED): Performed by: EMERGENCY MEDICINE

## 2024-06-18 PROCEDURE — 96366 THER/PROPH/DIAG IV INF ADDON: CPT

## 2024-06-18 PROCEDURE — 2500000001 HC RX 250 WO HCPCS SELF ADMINISTERED DRUGS (ALT 637 FOR MEDICARE OP): Performed by: EMERGENCY MEDICINE

## 2024-06-18 PROCEDURE — 96361 HYDRATE IV INFUSION ADD-ON: CPT

## 2024-06-18 PROCEDURE — 85025 COMPLETE CBC W/AUTO DIFF WBC: CPT | Performed by: EMERGENCY MEDICINE

## 2024-06-18 RX ORDER — ONDANSETRON HYDROCHLORIDE 2 MG/ML
4 INJECTION, SOLUTION INTRAVENOUS ONCE
Status: COMPLETED | OUTPATIENT
Start: 2024-06-18 | End: 2024-06-18

## 2024-06-18 RX ORDER — CALCIUM CARBONATE 300MG(750)
400 TABLET,CHEWABLE ORAL DAILY
Qty: 30 TABLET | Refills: 0 | Status: SHIPPED | OUTPATIENT
Start: 2024-06-18

## 2024-06-18 RX ORDER — KETOROLAC TROMETHAMINE 30 MG/ML
15 INJECTION, SOLUTION INTRAMUSCULAR; INTRAVENOUS ONCE
Status: COMPLETED | OUTPATIENT
Start: 2024-06-18 | End: 2024-06-18

## 2024-06-18 RX ORDER — ORPHENADRINE CITRATE 30 MG/ML
60 INJECTION INTRAMUSCULAR; INTRAVENOUS ONCE
Status: COMPLETED | OUTPATIENT
Start: 2024-06-18 | End: 2024-06-18

## 2024-06-18 RX ORDER — POTASSIUM CHLORIDE 20 MEQ/1
40 TABLET, EXTENDED RELEASE ORAL ONCE
Status: COMPLETED | OUTPATIENT
Start: 2024-06-18 | End: 2024-06-18

## 2024-06-18 RX ORDER — OXYCODONE HYDROCHLORIDE 5 MG/1
5 TABLET ORAL ONCE
Status: COMPLETED | OUTPATIENT
Start: 2024-06-18 | End: 2024-06-18

## 2024-06-18 RX ORDER — MAGNESIUM SULFATE HEPTAHYDRATE 40 MG/ML
2 INJECTION, SOLUTION INTRAVENOUS ONCE
Status: COMPLETED | OUTPATIENT
Start: 2024-06-18 | End: 2024-06-19

## 2024-06-18 RX ORDER — POTASSIUM CHLORIDE 20 MEQ/1
40 TABLET, EXTENDED RELEASE ORAL DAILY
Qty: 60 TABLET | Refills: 0 | Status: SHIPPED | OUTPATIENT
Start: 2024-06-18 | End: 2024-07-18

## 2024-06-18 RX ORDER — POTASSIUM CHLORIDE 14.9 MG/ML
20 INJECTION INTRAVENOUS
Status: COMPLETED | OUTPATIENT
Start: 2024-06-18 | End: 2024-06-18

## 2024-06-18 RX ORDER — DIAZEPAM 5 MG/ML
5 INJECTION, SOLUTION INTRAMUSCULAR; INTRAVENOUS ONCE
Status: COMPLETED | OUTPATIENT
Start: 2024-06-18 | End: 2024-06-18

## 2024-06-18 ASSESSMENT — COLUMBIA-SUICIDE SEVERITY RATING SCALE - C-SSRS
2. HAVE YOU ACTUALLY HAD ANY THOUGHTS OF KILLING YOURSELF?: NO
1. IN THE PAST MONTH, HAVE YOU WISHED YOU WERE DEAD OR WISHED YOU COULD GO TO SLEEP AND NOT WAKE UP?: NO
6. HAVE YOU EVER DONE ANYTHING, STARTED TO DO ANYTHING, OR PREPARED TO DO ANYTHING TO END YOUR LIFE?: NO
6. HAVE YOU EVER DONE ANYTHING, STARTED TO DO ANYTHING, OR PREPARED TO DO ANYTHING TO END YOUR LIFE?: NO
2. HAVE YOU ACTUALLY HAD ANY THOUGHTS OF KILLING YOURSELF?: NO
1. IN THE PAST MONTH, HAVE YOU WISHED YOU WERE DEAD OR WISHED YOU COULD GO TO SLEEP AND NOT WAKE UP?: NO

## 2024-06-18 ASSESSMENT — PAIN SCALES - GENERAL
PAINLEVEL_OUTOF10: 0 - NO PAIN
PAINLEVEL_OUTOF10: 10 - WORST POSSIBLE PAIN
PAINLEVEL_OUTOF10: 10 - WORST POSSIBLE PAIN
PAINLEVEL_OUTOF10: 6
PAINLEVEL_OUTOF10: 0 - NO PAIN
PAINLEVEL_OUTOF10: 0 - NO PAIN

## 2024-06-18 ASSESSMENT — PAIN DESCRIPTION - ORIENTATION
ORIENTATION: LEFT;RIGHT
ORIENTATION: RIGHT;LEFT
ORIENTATION: RIGHT;LEFT

## 2024-06-18 ASSESSMENT — PAIN DESCRIPTION - LOCATION
LOCATION: FOOT
LOCATION: FOOT

## 2024-06-18 ASSESSMENT — PAIN - FUNCTIONAL ASSESSMENT
PAIN_FUNCTIONAL_ASSESSMENT: 0-10

## 2024-06-18 ASSESSMENT — PAIN DESCRIPTION - PAIN TYPE: TYPE: ACUTE PAIN

## 2024-06-18 NOTE — ED PROVIDER NOTES
HPI   Chief Complaint   Patient presents with    nerve pain       The patient presents with multiple complaints including her chronic left leg nerve pain.  She states that this is always hurting her.  She NalDex gabapentin.  She has occasional oxycodones.  She has no fever cough or vomiting.  She does have some mild nausea and some chronic diarrhea.  She takes simethicone and cholestyramine.  She has issues with chronic hypokalemia and hypomagnesemia.  She take spironolactone.  Says she was on tizanidine but is run out of it today.                          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     Past Surgical History:   Procedure Laterality Date     SECTION, LOW TRANSVERSE      CHOLECYSTECTOMY       Family History   Problem Relation Name Age of Onset    Hypertension Mother      Hypokalemia Mother      Cholelithiasis Mother      Constipation Mother      Bilateral breast cancer Mother       Social History     Tobacco Use    Smoking status: Every Day     Current packs/day: 0.50     Average packs/day: 0.5 packs/day for 27.1 years (13.6 ttl pk-yrs)     Types: Cigarettes     Start date: 1997    Smokeless tobacco: Never   Substance Use Topics    Alcohol use: Yes     Comment: socially on weekends    Drug use: Not Currently       Physical Exam   ED Triage Vitals [24 0442]   Temperature Heart Rate Respirations BP   37.4 °C (99.3 °F) (!) 115 (!) 26 121/85      Pulse Ox Temp Source Heart Rate Source Patient Position   98 % Oral Monitor Lying      BP Location FiO2 (%)     Right arm --       Physical Exam  Vitals and nursing note reviewed.   Constitutional:       General: She is not in acute distress.     Appearance: She is well-developed.   HENT:      Head:  Normocephalic and atraumatic.   Eyes:      Conjunctiva/sclera: Conjunctivae normal.   Cardiovascular:      Rate and Rhythm: Normal rate and regular rhythm.      Heart sounds: No murmur heard.  Pulmonary:      Effort: Pulmonary effort is normal. No respiratory distress.      Breath sounds: Normal breath sounds.   Abdominal:      Palpations: Abdomen is soft.      Tenderness: There is no abdominal tenderness.   Musculoskeletal:         General: Tenderness present. No swelling.      Cervical back: Neck supple.      Comments: The patient has allodynia to light touch removal of the blanket over her left foot causes her to scream.  No erythema, warm foot, intact cap refill   Skin:     General: Skin is warm and dry.      Capillary Refill: Capillary refill takes less than 2 seconds.   Neurological:      Mental Status: She is alert.   Psychiatric:         Mood and Affect: Mood normal.         ED Course & MDM   Diagnoses as of 06/18/24 2308   Hypokalemia   Chronic pain of left lower extremity       Medical Decision Making  The patient has allodynia and chronic neuropathy.  However she does have issues with long-term electric deficiencies.  Will check for her mag and potassium today.  Do not feel I would likely be able to solve her localized neuropathy to the left leg.  No other signs of cauda equina.    Procedure  Procedures     Duran Magaña MD  06/18/24 4203

## 2024-06-18 NOTE — ED TRIAGE NOTES
The pt came in to the ed with nerve pain states that it is a 10 out of 10 she has muscle spasmus she states like her feet are in ice water and are tender to touch has had chronic pain since October. She thinks her potassium is low.

## 2024-06-18 NOTE — DISCHARGE INSTRUCTIONS
Please talk to your doctor about rechecking your potassium level in the next 2 weeks.  Please double your potassium dose for the next 2 weeks unless otherwise indicated by your primary doctor.

## 2024-06-19 NOTE — PROGRESS NOTES
Emergency Medicine Transition of Care Note.    I received Sharla Ramirez in signout from Dr. Roque.  Please see the previous ED provider note for all HPI, PE and MDM up to the time of signout. This is in addition to the primary record.    In brief Sharla Ramirez is an 44 y.o. female presenting for   Chief Complaint   Patient presents with    Abnormal Lab     Patient seen earlier today for low potasium was treated , was discharged , patient returned with cramping and stiffness to extremities      At the time of signout we were awaiting: Symptomatic improvement    Diagnoses as of 06/18/24 2307   Muscle spasm   Hypomagnesemia       Medical Decision Making  The patient received magnesium feels better.  Would like be discharged home.  Earlier in day when I saw the patient patient had a normal magnesium.  I did not send her home any because she states that she was told to stop this.  Apparently she came to get blood magnesium.  I did represcribe her magnesium.    Final diagnoses:   [M62.838] Muscle spasm   [E83.42] Hypomagnesemia           Procedure  Procedures    Duran Magaña MD

## 2024-06-19 NOTE — ED PROVIDER NOTES
HPI   Chief Complaint   Patient presents with    Abnormal Lab     Patient seen earlier today for low potasium was treated , was discharged , patient returned with cramping and stiffness to extremities        This is a 44-year-old female who presents to the emergency department complaining of diffuse muscle cramping.  The patient reports spasms throughout her whole body.  She reports that this is a chronic problem.  She was seen in the emergency department earlier today.  She was given potassium and magnesium replacement.  She states that after discharge her symptoms returned.  She denies fevers and chills.  She denies shortness of breath.    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     Past Surgical History:   Procedure Laterality Date     SECTION, LOW TRANSVERSE      CHOLECYSTECTOMY       Family History   Problem Relation Name Age of Onset    Hypertension Mother      Hypokalemia Mother      Cholelithiasis Mother      Constipation Mother      Bilateral breast cancer Mother       Social History     Tobacco Use    Smoking status: Every Day     Current packs/day: 0.50     Average packs/day: 0.5 packs/day for 27.1 years (13.6 ttl pk-yrs)     Types: Cigarettes     Start date: 1997    Smokeless tobacco: Never   Substance Use Topics    Alcohol use: Yes     Comment: socially on weekends    Drug use: Not Currently       Physical Exam   ED Triage Vitals [24 2103]   Temperature Heart Rate Respirations BP   37.2 °C (99 °F) (!) 116 18 123/84      Pulse Ox Temp Source Heart Rate Source Patient Position   98 % Tympanic Monitor --      BP Location FiO2 (%)     -- --        Physical Exam  Vitals and nursing note reviewed.   Constitutional:       Comments: Anxious   HENT:      Head: Normocephalic and atraumatic.      Nose: Nose normal.   Eyes:      Conjunctiva/sclera: Conjunctivae normal.   Cardiovascular:      Rate and Rhythm: Normal rate and regular rhythm.      Pulses: Normal pulses.      Heart sounds: Normal heart sounds.   Pulmonary:      Effort: Pulmonary effort is normal.      Breath sounds: Normal breath sounds.   Abdominal:      General: Bowel sounds are normal.      Palpations: Abdomen is soft.   Musculoskeletal:         General: Normal range of motion.      Cervical back: Normal range of motion and neck supple.      Comments: Diffuse body tenderness   Skin:     Findings: No rash.   Neurological:      General: No focal deficit present.      Mental Status: She is alert and oriented to person, place, and time.   Psychiatric:         Mood and Affect: Mood normal.         ED Course & MDM   Diagnoses as of 06/18/24 2205   Muscle spasm   Hypomagnesemia       Medical Decision Making  Differential diagnosis considered: Panic attack, muscle spasm, electrolyte abnormality, dehydration    The patient was treated with Valium while labs were performed.  Labs showed an improved potassium of 3.7.  Magnesium, however, was low at 1.3.  Patient had mild elevation of her LFTs but no abdominal pain.  On repeat assessment, her symptoms were improved.  She will be treated with 2 g of magnesium.  She will be signed out to the oncoming physician.        Procedure  Procedures     Livan Roque MD  06/18/24 2206

## 2024-07-15 ENCOUNTER — APPOINTMENT (OUTPATIENT)
Dept: PRIMARY CARE | Facility: CLINIC | Age: 44
End: 2024-07-15
Payer: COMMERCIAL

## 2024-07-17 ENCOUNTER — APPOINTMENT (OUTPATIENT)
Dept: PRIMARY CARE | Facility: CLINIC | Age: 44
End: 2024-07-17
Payer: COMMERCIAL

## 2024-08-06 ENCOUNTER — APPOINTMENT (OUTPATIENT)
Dept: PRIMARY CARE | Facility: CLINIC | Age: 44
End: 2024-08-06
Payer: COMMERCIAL

## 2024-08-26 ENCOUNTER — APPOINTMENT (OUTPATIENT)
Dept: PRIMARY CARE | Facility: CLINIC | Age: 44
End: 2024-08-26
Payer: COMMERCIAL

## 2024-09-20 ENCOUNTER — HOSPITAL ENCOUNTER (EMERGENCY)
Facility: HOSPITAL | Age: 44
Discharge: HOME | End: 2024-09-20
Attending: EMERGENCY MEDICINE
Payer: COMMERCIAL

## 2024-09-20 ENCOUNTER — APPOINTMENT (OUTPATIENT)
Dept: CARDIOLOGY | Facility: HOSPITAL | Age: 44
End: 2024-09-20
Payer: COMMERCIAL

## 2024-09-20 VITALS
HEART RATE: 75 BPM | TEMPERATURE: 98.8 F | OXYGEN SATURATION: 96 % | BODY MASS INDEX: 24.64 KG/M2 | RESPIRATION RATE: 18 BRPM | HEIGHT: 67 IN | DIASTOLIC BLOOD PRESSURE: 72 MMHG | SYSTOLIC BLOOD PRESSURE: 107 MMHG | WEIGHT: 157 LBS

## 2024-09-20 DIAGNOSIS — R06.4 HYPERVENTILATION: Primary | ICD-10-CM

## 2024-09-20 DIAGNOSIS — E83.42 HYPOMAGNESEMIA: ICD-10-CM

## 2024-09-20 LAB
ALBUMIN SERPL BCP-MCNC: 3.7 G/DL (ref 3.4–5)
ALP SERPL-CCNC: 234 U/L (ref 33–110)
ALT SERPL W P-5'-P-CCNC: 120 U/L (ref 7–45)
ANION GAP SERPL CALC-SCNC: 28 MMOL/L (ref 10–20)
AST SERPL W P-5'-P-CCNC: 191 U/L (ref 9–39)
B-HCG SERPL-ACNC: 2 MIU/ML
BASOPHILS # BLD AUTO: 0.04 X10*3/UL (ref 0–0.1)
BASOPHILS NFR BLD AUTO: 0.5 %
BILIRUB SERPL-MCNC: 0.8 MG/DL (ref 0–1.2)
BUN SERPL-MCNC: 6 MG/DL (ref 6–23)
CALCIUM SERPL-MCNC: 7.9 MG/DL (ref 8.6–10.3)
CHLORIDE SERPL-SCNC: 98 MMOL/L (ref 98–107)
CK SERPL-CCNC: 89 U/L (ref 0–215)
CO2 SERPL-SCNC: 19 MMOL/L (ref 21–32)
CREAT SERPL-MCNC: 0.81 MG/DL (ref 0.5–1.05)
EGFRCR SERPLBLD CKD-EPI 2021: >90 ML/MIN/1.73M*2
EOSINOPHIL # BLD AUTO: 0.04 X10*3/UL (ref 0–0.7)
EOSINOPHIL NFR BLD AUTO: 0.5 %
ERYTHROCYTE [DISTWIDTH] IN BLOOD BY AUTOMATED COUNT: 13.8 % (ref 11.5–14.5)
GLUCOSE BLD MANUAL STRIP-MCNC: 122 MG/DL (ref 74–99)
GLUCOSE SERPL-MCNC: 121 MG/DL (ref 74–99)
HCT VFR BLD AUTO: 36.5 % (ref 36–46)
HGB BLD-MCNC: 12.3 G/DL (ref 12–16)
IMM GRANULOCYTES # BLD AUTO: 0.01 X10*3/UL (ref 0–0.7)
IMM GRANULOCYTES NFR BLD AUTO: 0.1 % (ref 0–0.9)
LYMPHOCYTES # BLD AUTO: 4.48 X10*3/UL (ref 1.2–4.8)
LYMPHOCYTES NFR BLD AUTO: 53.5 %
MAGNESIUM SERPL-MCNC: 0.8 MG/DL (ref 1.6–2.4)
MCH RBC QN AUTO: 35.1 PG (ref 26–34)
MCHC RBC AUTO-ENTMCNC: 33.7 G/DL (ref 32–36)
MCV RBC AUTO: 104 FL (ref 80–100)
MONOCYTES # BLD AUTO: 1.12 X10*3/UL (ref 0.1–1)
MONOCYTES NFR BLD AUTO: 13.4 %
NEUTROPHILS # BLD AUTO: 2.69 X10*3/UL (ref 1.2–7.7)
NEUTROPHILS NFR BLD AUTO: 32 %
NRBC BLD-RTO: 0 /100 WBCS (ref 0–0)
PLATELET # BLD AUTO: 230 X10*3/UL (ref 150–450)
POTASSIUM SERPL-SCNC: 4.4 MMOL/L (ref 3.5–5.3)
PROT SERPL-MCNC: 6.8 G/DL (ref 6.4–8.2)
RBC # BLD AUTO: 3.5 X10*6/UL (ref 4–5.2)
SODIUM SERPL-SCNC: 141 MMOL/L (ref 136–145)
WBC # BLD AUTO: 8.4 X10*3/UL (ref 4.4–11.3)

## 2024-09-20 PROCEDURE — 2500000004 HC RX 250 GENERAL PHARMACY W/ HCPCS (ALT 636 FOR OP/ED): Performed by: EMERGENCY MEDICINE

## 2024-09-20 PROCEDURE — 80053 COMPREHEN METABOLIC PANEL: CPT | Performed by: EMERGENCY MEDICINE

## 2024-09-20 PROCEDURE — 83735 ASSAY OF MAGNESIUM: CPT | Performed by: EMERGENCY MEDICINE

## 2024-09-20 PROCEDURE — 96366 THER/PROPH/DIAG IV INF ADDON: CPT

## 2024-09-20 PROCEDURE — 85025 COMPLETE CBC W/AUTO DIFF WBC: CPT | Performed by: EMERGENCY MEDICINE

## 2024-09-20 PROCEDURE — 96375 TX/PRO/DX INJ NEW DRUG ADDON: CPT

## 2024-09-20 PROCEDURE — 96361 HYDRATE IV INFUSION ADD-ON: CPT

## 2024-09-20 PROCEDURE — 96372 THER/PROPH/DIAG INJ SC/IM: CPT | Mod: 59 | Performed by: EMERGENCY MEDICINE

## 2024-09-20 PROCEDURE — 93005 ELECTROCARDIOGRAM TRACING: CPT

## 2024-09-20 PROCEDURE — 82947 ASSAY GLUCOSE BLOOD QUANT: CPT

## 2024-09-20 PROCEDURE — 96365 THER/PROPH/DIAG IV INF INIT: CPT

## 2024-09-20 PROCEDURE — 84702 CHORIONIC GONADOTROPIN TEST: CPT | Performed by: EMERGENCY MEDICINE

## 2024-09-20 PROCEDURE — 99284 EMERGENCY DEPT VISIT MOD MDM: CPT | Mod: 25

## 2024-09-20 PROCEDURE — 82550 ASSAY OF CK (CPK): CPT | Performed by: EMERGENCY MEDICINE

## 2024-09-20 PROCEDURE — 36415 COLL VENOUS BLD VENIPUNCTURE: CPT | Performed by: EMERGENCY MEDICINE

## 2024-09-20 RX ORDER — MAGNESIUM CHLORIDE 64 MG
64 TABLET, DELAYED RELEASE (ENTERIC COATED) ORAL ONCE
Status: DISCONTINUED | OUTPATIENT
Start: 2024-09-20 | End: 2024-09-20

## 2024-09-20 RX ORDER — MAGNESIUM SULFATE HEPTAHYDRATE 40 MG/ML
2 INJECTION, SOLUTION INTRAVENOUS ONCE
Status: COMPLETED | OUTPATIENT
Start: 2024-09-20 | End: 2024-09-20

## 2024-09-20 RX ORDER — ZIPRASIDONE MESYLATE 20 MG/ML
10 INJECTION, POWDER, LYOPHILIZED, FOR SOLUTION INTRAMUSCULAR ONCE
Status: COMPLETED | OUTPATIENT
Start: 2024-09-20 | End: 2024-09-20

## 2024-09-20 RX ORDER — LANOLIN ALCOHOL/MO/W.PET/CERES
400 CREAM (GRAM) TOPICAL ONCE
Status: COMPLETED | OUTPATIENT
Start: 2024-09-20 | End: 2024-09-20

## 2024-09-20 RX ORDER — SENNOSIDES 8.6 MG
1 TABLET ORAL EVERY 24 HOURS
Qty: 30 TABLET | Refills: 0 | Status: ON HOLD | OUTPATIENT
Start: 2024-09-20 | End: 2024-10-20

## 2024-09-20 RX ORDER — LORAZEPAM 2 MG/ML
2 INJECTION INTRAMUSCULAR ONCE
Status: COMPLETED | OUTPATIENT
Start: 2024-09-20 | End: 2024-09-20

## 2024-09-20 ASSESSMENT — PAIN - FUNCTIONAL ASSESSMENT: PAIN_FUNCTIONAL_ASSESSMENT: 0-10

## 2024-09-20 ASSESSMENT — PAIN SCALES - GENERAL: PAINLEVEL_OUTOF10: 10 - WORST POSSIBLE PAIN

## 2024-09-20 NOTE — ED TRIAGE NOTES
Pt reports to ED via squad with all over cramping, spasms, and shaking that stated around 1600 today. Concerned it is caused by low K and Mag.     Hx of gallstone, high BP, asthma.     Denies SOB, denies Chest pain.

## 2024-09-20 NOTE — ED PROVIDER NOTES
HPI   Chief Complaint   Patient presents with    Shaking    Abdominal Cramping       The chief complaint: Cramping, shaking    History present illness: Patient is a 44-year-old female with history of hypomagnesemia presenting to the emergency department with complaints cramping and shaking.  According to the patient, she started experiencing symptoms this morning.  The patient states that she has a history of this when she was diagnosed with low potassium and low magnesium in the past.  EMS was called and the patient was brought to the emergency department for further evaluation.      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     Past Surgical History:   Procedure Laterality Date     SECTION, LOW TRANSVERSE  2000    CHOLECYSTECTOMY       Family History   Problem Relation Name Age of Onset    Hypertension Mother      Hypokalemia Mother      Cholelithiasis Mother      Constipation Mother      Bilateral breast cancer Mother       Social History     Tobacco Use    Smoking status: Every Day     Current packs/day: 0.50     Average packs/day: 0.5 packs/day for 27.4 years (13.7 ttl pk-yrs)     Types: Cigarettes     Start date: 1997    Smokeless tobacco: Never   Substance Use Topics    Alcohol use: Yes     Comment: socially on weekends    Drug use: Not Currently       Physical Exam   ED Triage Vitals [24 1627]   Temperature Heart Rate Respirations BP   37.1 °C (98.8 °F) (!) 105 18 (!) 129/95      Pulse Ox Temp Source Heart Rate Source Patient Position   96 % Oral Monitor Sitting      BP Location FiO2 (%)     Left arm --       Physical Exam  Constitutional:       Appearance: Normal appearance.   HENT:      Head: Normocephalic and atraumatic.       Right Ear: External ear normal.      Left Ear: External ear normal.      Nose: Nose normal.      Mouth/Throat:      Mouth: Mucous membranes are moist.   Eyes:      General: Lids are normal.      Extraocular Movements: Extraocular movements intact.      Pupils: Pupils are equal, round, and reactive to light.   Cardiovascular:      Rate and Rhythm: Normal rate and regular rhythm.      Heart sounds: Normal heart sounds.   Pulmonary:      Effort: Pulmonary effort is normal. Tachypnea present. No respiratory distress.      Breath sounds: Normal breath sounds.   Abdominal:      General: Abdomen is flat.      Palpations: Abdomen is soft.      Tenderness: There is no abdominal tenderness. There is no guarding or rebound.   Musculoskeletal:         General: No deformity. Normal range of motion.      Cervical back: Normal range of motion and neck supple.   Skin:     General: Skin is warm.      Capillary Refill: Capillary refill takes less than 2 seconds.      Coloration: Skin is not jaundiced.   Neurological:      General: No focal deficit present.      Mental Status: She is alert and oriented to person, place, and time.      Comments: The patient has carpopedal spasms   Psychiatric:         Mood and Affect: Mood is anxious.         Behavior: Behavior normal.           ED Course & MDM   Diagnoses as of 09/25/24 1754   Hyperventilation   Hypomagnesemia                 No data recorded                                 Medical Decision Making  Initially on presentation the emergency department, it was apparent that the patient was having a panic attack.  The patient was hyperventilating she had spasms of all 4 extremities.  As result I ordered the patient both Ativan as well as Geodon IM.  Given the patient's history of electrolyte abnormalities, I ordered blood work for the patient.    CBC demonstrated no significant abnormalities Chem-7 demonstrated a low bicarb consistent with hyperventilation LFTs demonstrated AST of 191  and alk phos of 234 and ALT of 120 but normal bilirubin magnesium was low at 0.8 beta-hCG was negative the patient CK was 89.  The patient's EKG demonstrated a normal sinus rhythm with a rate of 95 bpm isoelectric ST segments narrow QRS complexes and a QTc of 467.    Patient presents to the emergency department with complaints of cramping.  Workup was performed as above and did demonstrate a mild hypomagnesemia.  Patient was given IV magnesium during her time in the emergency department.  On reevaluation after anxiolytics, the patient is greatly improved patient was given 2 L normal saline IV.  The patient was given a prescription for magnesium chloride for home use she was instructed to follow-up with a primary care physician and to return for any worsening symptoms the patient was then discharged home in greatly improved condition.    Amount and/or Complexity of Data Reviewed  Labs: ordered. Decision-making details documented in ED Course.  ECG/medicine tests: ordered and independent interpretation performed. Decision-making details documented in ED Course.        Procedure  Procedures     Cahlo Pinon MD  09/25/24 3536

## 2024-09-24 LAB
ATRIAL RATE: 95 BPM
P AXIS: 37 DEGREES
P OFFSET: 200 MS
P ONSET: 154 MS
PR INTERVAL: 136 MS
Q ONSET: 222 MS
QRS COUNT: 15 BEATS
QRS DURATION: 74 MS
QT INTERVAL: 372 MS
QTC CALCULATION(BAZETT): 467 MS
QTC FREDERICIA: 433 MS
R AXIS: -10 DEGREES
T AXIS: 27 DEGREES
T OFFSET: 408 MS
VENTRICULAR RATE: 95 BPM

## 2024-09-29 ENCOUNTER — HOSPITAL ENCOUNTER (OUTPATIENT)
Facility: HOSPITAL | Age: 44
Setting detail: OBSERVATION
Discharge: HOME | End: 2024-10-01
Attending: EMERGENCY MEDICINE | Admitting: INTERNAL MEDICINE
Payer: COMMERCIAL

## 2024-09-29 ENCOUNTER — APPOINTMENT (OUTPATIENT)
Dept: CARDIOLOGY | Facility: HOSPITAL | Age: 44
End: 2024-09-29
Payer: COMMERCIAL

## 2024-09-29 DIAGNOSIS — E87.8 ELECTROLYTE ABNORMALITY: ICD-10-CM

## 2024-09-29 DIAGNOSIS — E83.42 HYPOMAGNESEMIA: Primary | ICD-10-CM

## 2024-09-29 DIAGNOSIS — E87.6 HYPOKALEMIA: ICD-10-CM

## 2024-09-29 LAB
ALBUMIN SERPL BCP-MCNC: 3.7 G/DL (ref 3.4–5)
ALP SERPL-CCNC: 225 U/L (ref 33–110)
ALT SERPL W P-5'-P-CCNC: 110 U/L (ref 7–45)
ANION GAP SERPL CALC-SCNC: 23 MMOL/L (ref 10–20)
AST SERPL W P-5'-P-CCNC: 291 U/L (ref 9–39)
BASOPHILS # BLD AUTO: 0.04 X10*3/UL (ref 0–0.1)
BASOPHILS NFR BLD AUTO: 0.7 %
BILIRUB SERPL-MCNC: 1.5 MG/DL (ref 0–1.2)
BUN SERPL-MCNC: 9 MG/DL (ref 6–23)
CA-I BLD-SCNC: 0.99 MMOL/L (ref 1.1–1.33)
CALCIUM SERPL-MCNC: 8 MG/DL (ref 8.6–10.3)
CHLORIDE SERPL-SCNC: 95 MMOL/L (ref 98–107)
CK SERPL-CCNC: 92 U/L (ref 0–215)
CO2 SERPL-SCNC: 26 MMOL/L (ref 21–32)
CREAT SERPL-MCNC: 0.78 MG/DL (ref 0.5–1.05)
EGFRCR SERPLBLD CKD-EPI 2021: >90 ML/MIN/1.73M*2
EOSINOPHIL # BLD AUTO: 0 X10*3/UL (ref 0–0.7)
EOSINOPHIL NFR BLD AUTO: 0 %
ERYTHROCYTE [DISTWIDTH] IN BLOOD BY AUTOMATED COUNT: 14.1 % (ref 11.5–14.5)
GLUCOSE SERPL-MCNC: 130 MG/DL (ref 74–99)
HCT VFR BLD AUTO: 33.9 % (ref 36–46)
HGB BLD-MCNC: 11.5 G/DL (ref 12–16)
IMM GRANULOCYTES # BLD AUTO: 0.02 X10*3/UL (ref 0–0.7)
IMM GRANULOCYTES NFR BLD AUTO: 0.3 % (ref 0–0.9)
LYMPHOCYTES # BLD AUTO: 0.88 X10*3/UL (ref 1.2–4.8)
LYMPHOCYTES NFR BLD AUTO: 15.2 %
MAGNESIUM SERPL-MCNC: 0.8 MG/DL (ref 1.6–2.4)
MCH RBC QN AUTO: 35.2 PG (ref 26–34)
MCHC RBC AUTO-ENTMCNC: 33.9 G/DL (ref 32–36)
MCV RBC AUTO: 104 FL (ref 80–100)
MONOCYTES # BLD AUTO: 0.53 X10*3/UL (ref 0.1–1)
MONOCYTES NFR BLD AUTO: 9.2 %
NEUTROPHILS # BLD AUTO: 4.31 X10*3/UL (ref 1.2–7.7)
NEUTROPHILS NFR BLD AUTO: 74.6 %
NRBC BLD-RTO: 0 /100 WBCS (ref 0–0)
PHOSPHATE SERPL-MCNC: 1.5 MG/DL (ref 2.5–4.9)
PLATELET # BLD AUTO: 194 X10*3/UL (ref 150–450)
POTASSIUM SERPL-SCNC: 4.2 MMOL/L (ref 3.5–5.3)
PROT SERPL-MCNC: 6.8 G/DL (ref 6.4–8.2)
RBC # BLD AUTO: 3.27 X10*6/UL (ref 4–5.2)
SODIUM SERPL-SCNC: 140 MMOL/L (ref 136–145)
WBC # BLD AUTO: 5.8 X10*3/UL (ref 4.4–11.3)

## 2024-09-29 PROCEDURE — 2500000002 HC RX 250 W HCPCS SELF ADMINISTERED DRUGS (ALT 637 FOR MEDICARE OP, ALT 636 FOR OP/ED): Performed by: INTERNAL MEDICINE

## 2024-09-29 PROCEDURE — 96365 THER/PROPH/DIAG IV INF INIT: CPT

## 2024-09-29 PROCEDURE — G0378 HOSPITAL OBSERVATION PER HR: HCPCS

## 2024-09-29 PROCEDURE — 96376 TX/PRO/DX INJ SAME DRUG ADON: CPT

## 2024-09-29 PROCEDURE — 2500000001 HC RX 250 WO HCPCS SELF ADMINISTERED DRUGS (ALT 637 FOR MEDICARE OP): Performed by: INTERNAL MEDICINE

## 2024-09-29 PROCEDURE — 96366 THER/PROPH/DIAG IV INF ADDON: CPT

## 2024-09-29 PROCEDURE — 93005 ELECTROCARDIOGRAM TRACING: CPT

## 2024-09-29 PROCEDURE — 2500000004 HC RX 250 GENERAL PHARMACY W/ HCPCS (ALT 636 FOR OP/ED)

## 2024-09-29 PROCEDURE — 80053 COMPREHEN METABOLIC PANEL: CPT

## 2024-09-29 PROCEDURE — 36415 COLL VENOUS BLD VENIPUNCTURE: CPT

## 2024-09-29 PROCEDURE — 94640 AIRWAY INHALATION TREATMENT: CPT

## 2024-09-29 PROCEDURE — 82550 ASSAY OF CK (CPK): CPT

## 2024-09-29 PROCEDURE — 84100 ASSAY OF PHOSPHORUS: CPT

## 2024-09-29 PROCEDURE — 96375 TX/PRO/DX INJ NEW DRUG ADDON: CPT

## 2024-09-29 PROCEDURE — 85025 COMPLETE CBC W/AUTO DIFF WBC: CPT

## 2024-09-29 PROCEDURE — 2500000002 HC RX 250 W HCPCS SELF ADMINISTERED DRUGS (ALT 637 FOR MEDICARE OP, ALT 636 FOR OP/ED)

## 2024-09-29 PROCEDURE — 2500000005 HC RX 250 GENERAL PHARMACY W/O HCPCS

## 2024-09-29 PROCEDURE — 2500000001 HC RX 250 WO HCPCS SELF ADMINISTERED DRUGS (ALT 637 FOR MEDICARE OP)

## 2024-09-29 PROCEDURE — 2500000004 HC RX 250 GENERAL PHARMACY W/ HCPCS (ALT 636 FOR OP/ED): Performed by: INTERNAL MEDICINE

## 2024-09-29 PROCEDURE — 99285 EMERGENCY DEPT VISIT HI MDM: CPT | Mod: 25

## 2024-09-29 PROCEDURE — 82330 ASSAY OF CALCIUM: CPT

## 2024-09-29 PROCEDURE — 83735 ASSAY OF MAGNESIUM: CPT

## 2024-09-29 PROCEDURE — 96361 HYDRATE IV INFUSION ADD-ON: CPT

## 2024-09-29 RX ORDER — ALBUTEROL SULFATE 90 UG/1
2 INHALANT RESPIRATORY (INHALATION) EVERY 6 HOURS PRN
Status: DISCONTINUED | OUTPATIENT
Start: 2024-09-29 | End: 2024-09-29 | Stop reason: SDUPTHER

## 2024-09-29 RX ORDER — AMLODIPINE BESYLATE 5 MG/1
5 TABLET ORAL DAILY
Status: DISCONTINUED | OUTPATIENT
Start: 2024-09-30 | End: 2024-10-01 | Stop reason: HOSPADM

## 2024-09-29 RX ORDER — ALBUTEROL SULFATE 0.83 MG/ML
2.5 SOLUTION RESPIRATORY (INHALATION) EVERY 2 HOUR PRN
Status: DISCONTINUED | OUTPATIENT
Start: 2024-09-29 | End: 2024-10-01 | Stop reason: HOSPADM

## 2024-09-29 RX ORDER — ONDANSETRON 4 MG/1
4 TABLET, ORALLY DISINTEGRATING ORAL EVERY 8 HOURS PRN
Status: DISCONTINUED | OUTPATIENT
Start: 2024-09-29 | End: 2024-10-01 | Stop reason: HOSPADM

## 2024-09-29 RX ORDER — POTASSIUM CHLORIDE 20 MEQ/1
20 TABLET, EXTENDED RELEASE ORAL DAILY
COMMUNITY

## 2024-09-29 RX ORDER — ONDANSETRON HYDROCHLORIDE 2 MG/ML
4 INJECTION, SOLUTION INTRAVENOUS ONCE
Status: COMPLETED | OUTPATIENT
Start: 2024-09-29 | End: 2024-09-29

## 2024-09-29 RX ORDER — TIZANIDINE 4 MG/1
4 TABLET ORAL 2 TIMES DAILY PRN
COMMUNITY

## 2024-09-29 RX ORDER — FORMOTEROL FUMARATE DIHYDRATE 20 UG/2ML
20 SOLUTION RESPIRATORY (INHALATION)
Status: DISCONTINUED | OUTPATIENT
Start: 2024-09-29 | End: 2024-10-01 | Stop reason: HOSPADM

## 2024-09-29 RX ORDER — CLONIDINE HYDROCHLORIDE 0.1 MG/1
0.1 TABLET ORAL ONCE
Status: COMPLETED | OUTPATIENT
Start: 2024-09-29 | End: 2024-09-29

## 2024-09-29 RX ORDER — CHOLESTYRAMINE 4 G/4.8G
8 POWDER, FOR SUSPENSION ORAL 3 TIMES DAILY
Status: DISCONTINUED | OUTPATIENT
Start: 2024-09-29 | End: 2024-10-01 | Stop reason: HOSPADM

## 2024-09-29 RX ORDER — SPIRONOLACTONE 25 MG/1
25 TABLET ORAL DAILY
Status: DISCONTINUED | OUTPATIENT
Start: 2024-09-30 | End: 2024-10-01 | Stop reason: HOSPADM

## 2024-09-29 RX ORDER — MAGNESIUM SULFATE HEPTAHYDRATE 40 MG/ML
4 INJECTION, SOLUTION INTRAVENOUS ONCE
Status: COMPLETED | OUTPATIENT
Start: 2024-09-29 | End: 2024-09-29

## 2024-09-29 RX ORDER — MAGNESIUM CHLORIDE 64 MG
70 TABLET, DELAYED RELEASE (ENTERIC COATED) ORAL DAILY
Status: DISCONTINUED | OUTPATIENT
Start: 2024-09-30 | End: 2024-09-29

## 2024-09-29 RX ORDER — CETIRIZINE HYDROCHLORIDE 10 MG/1
10 TABLET ORAL DAILY
Status: DISCONTINUED | OUTPATIENT
Start: 2024-09-30 | End: 2024-10-01 | Stop reason: HOSPADM

## 2024-09-29 RX ORDER — POTASSIUM CHLORIDE 20 MEQ/1
20 TABLET, EXTENDED RELEASE ORAL DAILY
Status: DISCONTINUED | OUTPATIENT
Start: 2024-09-30 | End: 2024-10-01 | Stop reason: HOSPADM

## 2024-09-29 RX ORDER — ONDANSETRON 4 MG/1
4 TABLET, ORALLY DISINTEGRATING ORAL EVERY 8 HOURS PRN
COMMUNITY
End: 2024-10-01 | Stop reason: HOSPADM

## 2024-09-29 RX ORDER — POLYETHYLENE GLYCOL 3350 17 G/17G
17 POWDER, FOR SOLUTION ORAL DAILY
Status: DISCONTINUED | OUTPATIENT
Start: 2024-09-29 | End: 2024-10-01 | Stop reason: HOSPADM

## 2024-09-29 RX ORDER — FLUTICASONE FUROATE AND VILANTEROL 100; 25 UG/1; UG/1
1 POWDER RESPIRATORY (INHALATION)
Status: DISCONTINUED | OUTPATIENT
Start: 2024-09-30 | End: 2024-09-29

## 2024-09-29 RX ORDER — AMLODIPINE BESYLATE 5 MG/1
5 TABLET ORAL DAILY
Status: DISCONTINUED | OUTPATIENT
Start: 2024-09-29 | End: 2024-09-29

## 2024-09-29 RX ORDER — TIZANIDINE 4 MG/1
4 TABLET ORAL 2 TIMES DAILY PRN
Status: DISCONTINUED | OUTPATIENT
Start: 2024-09-29 | End: 2024-10-01 | Stop reason: HOSPADM

## 2024-09-29 RX ORDER — OMEPRAZOLE 20 MG/1
20 CAPSULE, DELAYED RELEASE ORAL DAILY
COMMUNITY

## 2024-09-29 RX ORDER — SIMETHICONE 80 MG
80 TABLET,CHEWABLE ORAL 4 TIMES DAILY PRN
Status: DISCONTINUED | OUTPATIENT
Start: 2024-09-29 | End: 2024-10-01 | Stop reason: HOSPADM

## 2024-09-29 RX ORDER — DULOXETIN HYDROCHLORIDE 30 MG/1
60 CAPSULE, DELAYED RELEASE ORAL DAILY
Status: DISCONTINUED | OUTPATIENT
Start: 2024-09-30 | End: 2024-10-01 | Stop reason: HOSPADM

## 2024-09-29 RX ORDER — LORAZEPAM 2 MG/ML
2 INJECTION INTRAMUSCULAR ONCE
Status: COMPLETED | OUTPATIENT
Start: 2024-09-29 | End: 2024-09-29

## 2024-09-29 RX ORDER — FAMOTIDINE 20 MG/1
20 TABLET, FILM COATED ORAL NIGHTLY
Status: DISCONTINUED | OUTPATIENT
Start: 2024-09-29 | End: 2024-10-01 | Stop reason: HOSPADM

## 2024-09-29 RX ORDER — NORGESTIMATE AND ETHINYL ESTRADIOL 7DAYSX3 LO
1 KIT ORAL DAILY
COMMUNITY

## 2024-09-29 RX ORDER — NORGESTIMATE AND ETHINYL ESTRADIOL 7DAYSX3 LO
1 KIT ORAL DAILY
Status: DISCONTINUED | OUTPATIENT
Start: 2024-09-30 | End: 2024-10-01 | Stop reason: HOSPADM

## 2024-09-29 RX ORDER — LOSARTAN POTASSIUM 50 MG/1
100 TABLET ORAL DAILY
Status: DISCONTINUED | OUTPATIENT
Start: 2024-09-30 | End: 2024-10-01 | Stop reason: HOSPADM

## 2024-09-29 RX ORDER — FENTANYL CITRATE 50 UG/ML
50 INJECTION, SOLUTION INTRAMUSCULAR; INTRAVENOUS ONCE
Status: COMPLETED | OUTPATIENT
Start: 2024-09-29 | End: 2024-09-29

## 2024-09-29 RX ORDER — CALCIUM GLUCONATE 20 MG/ML
1 INJECTION, SOLUTION INTRAVENOUS EVERY 4 HOURS
Status: DISCONTINUED | OUTPATIENT
Start: 2024-09-29 | End: 2024-09-29

## 2024-09-29 RX ORDER — KETOTIFEN FUMARATE 0.35 MG/ML
1 SOLUTION/ DROPS OPHTHALMIC 2 TIMES DAILY
Status: DISCONTINUED | OUTPATIENT
Start: 2024-09-29 | End: 2024-10-01 | Stop reason: HOSPADM

## 2024-09-29 RX ORDER — CLONIDINE HYDROCHLORIDE 0.1 MG/1
0.1 TABLET ORAL DAILY
Status: DISCONTINUED | OUTPATIENT
Start: 2024-09-30 | End: 2024-10-01 | Stop reason: HOSPADM

## 2024-09-29 RX ORDER — ALBUTEROL SULFATE 0.83 MG/ML
2.5 SOLUTION RESPIRATORY (INHALATION) EVERY 4 HOURS PRN
Status: DISCONTINUED | OUTPATIENT
Start: 2024-09-29 | End: 2024-09-29

## 2024-09-29 RX ORDER — CALCIUM GLUCONATE 20 MG/ML
1 INJECTION, SOLUTION INTRAVENOUS ONCE
Status: COMPLETED | OUTPATIENT
Start: 2024-09-29 | End: 2024-09-30

## 2024-09-29 RX ORDER — SIMETHICONE 180 MG
180 CAPSULE ORAL 3 TIMES DAILY PRN
COMMUNITY

## 2024-09-29 RX ORDER — CALCIUM GLUCONATE 20 MG/ML
1 INJECTION, SOLUTION INTRAVENOUS ONCE
Status: COMPLETED | OUTPATIENT
Start: 2024-09-30 | End: 2024-09-30

## 2024-09-29 RX ORDER — PANTOPRAZOLE SODIUM 40 MG/1
40 TABLET, DELAYED RELEASE ORAL
Status: DISCONTINUED | OUTPATIENT
Start: 2024-09-30 | End: 2024-10-01 | Stop reason: HOSPADM

## 2024-09-29 RX ORDER — ALBUTEROL SULFATE 0.83 MG/ML
2.5 SOLUTION RESPIRATORY (INHALATION) EVERY 4 HOURS PRN
COMMUNITY

## 2024-09-29 RX ORDER — LANOLIN ALCOHOL/MO/W.PET/CERES
800 CREAM (GRAM) TOPICAL 2 TIMES DAILY
Status: DISCONTINUED | OUTPATIENT
Start: 2024-09-29 | End: 2024-10-01

## 2024-09-29 RX ORDER — BUDESONIDE 0.5 MG/2ML
0.5 INHALANT ORAL
Status: DISCONTINUED | OUTPATIENT
Start: 2024-09-29 | End: 2024-10-01 | Stop reason: HOSPADM

## 2024-09-29 RX ORDER — KETOTIFEN FUMARATE 0.35 MG/ML
1 SOLUTION/ DROPS OPHTHALMIC 2 TIMES DAILY
COMMUNITY

## 2024-09-29 SDOH — SOCIAL STABILITY: SOCIAL INSECURITY
WITHIN THE LAST YEAR, HAVE TO BEEN RAPED OR FORCED TO HAVE ANY KIND OF SEXUAL ACTIVITY BY YOUR PARTNER OR EX-PARTNER?: NO

## 2024-09-29 SDOH — SOCIAL STABILITY: SOCIAL INSECURITY: DOES ANYONE TRY TO KEEP YOU FROM HAVING/CONTACTING OTHER FRIENDS OR DOING THINGS OUTSIDE YOUR HOME?: NO

## 2024-09-29 SDOH — SOCIAL STABILITY: SOCIAL INSECURITY: ARE THERE ANY APPARENT SIGNS OF INJURIES/BEHAVIORS THAT COULD BE RELATED TO ABUSE/NEGLECT?: NO

## 2024-09-29 SDOH — ECONOMIC STABILITY: FOOD INSECURITY: WITHIN THE PAST 12 MONTHS, YOU WORRIED THAT YOUR FOOD WOULD RUN OUT BEFORE YOU GOT MONEY TO BUY MORE.: NEVER TRUE

## 2024-09-29 SDOH — SOCIAL STABILITY: SOCIAL INSECURITY: HAS ANYONE EVER THREATENED TO HURT YOUR FAMILY OR YOUR PETS?: NO

## 2024-09-29 SDOH — SOCIAL STABILITY: SOCIAL INSECURITY: HAVE YOU HAD THOUGHTS OF HARMING ANYONE ELSE?: NO

## 2024-09-29 SDOH — ECONOMIC STABILITY: HOUSING INSECURITY: AT ANY TIME IN THE PAST 12 MONTHS, WERE YOU HOMELESS OR LIVING IN A SHELTER (INCLUDING NOW)?: NO

## 2024-09-29 SDOH — ECONOMIC STABILITY: INCOME INSECURITY: IN THE LAST 12 MONTHS, WAS THERE A TIME WHEN YOU WERE NOT ABLE TO PAY THE MORTGAGE OR RENT ON TIME?: NO

## 2024-09-29 SDOH — SOCIAL STABILITY: SOCIAL INSECURITY: DO YOU FEEL UNSAFE GOING BACK TO THE PLACE WHERE YOU ARE LIVING?: NO

## 2024-09-29 SDOH — SOCIAL STABILITY: SOCIAL INSECURITY: WITHIN THE LAST YEAR, HAVE YOU BEEN HUMILIATED OR EMOTIONALLY ABUSED IN OTHER WAYS BY YOUR PARTNER OR EX-PARTNER?: NO

## 2024-09-29 SDOH — SOCIAL STABILITY: SOCIAL INSECURITY: WITHIN THE LAST YEAR, HAVE YOU BEEN AFRAID OF YOUR PARTNER OR EX-PARTNER?: NO

## 2024-09-29 SDOH — SOCIAL STABILITY: SOCIAL INSECURITY
WITHIN THE LAST YEAR, HAVE YOU BEEN KICKED, HIT, SLAPPED, OR OTHERWISE PHYSICALLY HURT BY YOUR PARTNER OR EX-PARTNER?: NO

## 2024-09-29 SDOH — SOCIAL STABILITY: SOCIAL INSECURITY: DO YOU FEEL ANYONE HAS EXPLOITED OR TAKEN ADVANTAGE OF YOU FINANCIALLY OR OF YOUR PERSONAL PROPERTY?: NO

## 2024-09-29 SDOH — ECONOMIC STABILITY: INCOME INSECURITY: HOW HARD IS IT FOR YOU TO PAY FOR THE VERY BASICS LIKE FOOD, HOUSING, MEDICAL CARE, AND HEATING?: NOT VERY HARD

## 2024-09-29 SDOH — ECONOMIC STABILITY: FOOD INSECURITY: WITHIN THE PAST 12 MONTHS, THE FOOD YOU BOUGHT JUST DIDN'T LAST AND YOU DIDN'T HAVE MONEY TO GET MORE.: NEVER TRUE

## 2024-09-29 SDOH — SOCIAL STABILITY: SOCIAL INSECURITY: HAVE YOU HAD ANY THOUGHTS OF HARMING ANYONE ELSE?: NO

## 2024-09-29 SDOH — ECONOMIC STABILITY: HOUSING INSECURITY: DO YOU FEEL UNSAFE GOING BACK TO THE PLACE WHERE YOU LIVE?: NO

## 2024-09-29 SDOH — ECONOMIC STABILITY: INCOME INSECURITY: IN THE PAST 12 MONTHS, HAS THE ELECTRIC, GAS, OIL, OR WATER COMPANY THREATENED TO SHUT OFF SERVICE IN YOUR HOME?: NO

## 2024-09-29 SDOH — SOCIAL STABILITY: SOCIAL INSECURITY: ARE YOU OR HAVE YOU BEEN THREATENED OR ABUSED PHYSICALLY, EMOTIONALLY, OR SEXUALLY BY ANYONE?: NO

## 2024-09-29 SDOH — ECONOMIC STABILITY: HOUSING INSECURITY: IN THE PAST 12 MONTHS, HOW MANY TIMES HAVE YOU MOVED WHERE YOU WERE LIVING?: 1

## 2024-09-29 SDOH — ECONOMIC STABILITY: TRANSPORTATION INSECURITY
IN THE PAST 12 MONTHS, HAS LACK OF TRANSPORTATION KEPT YOU FROM MEETINGS, WORK, OR FROM GETTING THINGS NEEDED FOR DAILY LIVING?: NO

## 2024-09-29 SDOH — SOCIAL STABILITY: SOCIAL INSECURITY: WERE YOU ABLE TO COMPLETE ALL THE BEHAVIORAL HEALTH SCREENINGS?: YES

## 2024-09-29 SDOH — ECONOMIC STABILITY: TRANSPORTATION INSECURITY
IN THE PAST 12 MONTHS, HAS THE LACK OF TRANSPORTATION KEPT YOU FROM MEDICAL APPOINTMENTS OR FROM GETTING MEDICATIONS?: NO

## 2024-09-29 SDOH — SOCIAL STABILITY: SOCIAL INSECURITY: ABUSE: ADULT

## 2024-09-29 ASSESSMENT — PATIENT HEALTH QUESTIONNAIRE - PHQ9
2. FEELING DOWN, DEPRESSED OR HOPELESS: NOT AT ALL
SUM OF ALL RESPONSES TO PHQ9 QUESTIONS 1 & 2: 0
1. LITTLE INTEREST OR PLEASURE IN DOING THINGS: NOT AT ALL

## 2024-09-29 ASSESSMENT — COGNITIVE AND FUNCTIONAL STATUS - GENERAL
CLIMB 3 TO 5 STEPS WITH RAILING: A LOT
TOILETING: A LITTLE
MOBILITY SCORE: 21
PATIENT BASELINE BEDBOUND: NO
DAILY ACTIVITIY SCORE: 23
WALKING IN HOSPITAL ROOM: A LITTLE

## 2024-09-29 ASSESSMENT — ACTIVITIES OF DAILY LIVING (ADL)
HEARING - RIGHT EAR: FUNCTIONAL
JUDGMENT_ADEQUATE_SAFELY_COMPLETE_DAILY_ACTIVITIES: YES
TOILETING: INDEPENDENT
HEARING - RIGHT EAR: FUNCTIONAL
WALKS IN HOME: NEEDS ASSISTANCE
BATHING: INDEPENDENT
FEEDING YOURSELF: INDEPENDENT
BATHING: INDEPENDENT
ASSISTIVE_DEVICE: CRUTCHES;WHEELCHAIR
DRESSING YOURSELF: INDEPENDENT
ASSISTIVE_DEVICE: CRUTCHES;WHEELCHAIR
PATIENT'S MEMORY ADEQUATE TO SAFELY COMPLETE DAILY ACTIVITIES?: YES
TOILETING: INDEPENDENT
DRESSING YOURSELF: INDEPENDENT
JUDGMENT_ADEQUATE_SAFELY_COMPLETE_DAILY_ACTIVITIES: YES
FEEDING YOURSELF: INDEPENDENT
ADEQUATE_TO_COMPLETE_ADL: YES
WALKS IN HOME: NEEDS ASSISTANCE
ADEQUATE_TO_COMPLETE_ADL: YES
GROOMING: INDEPENDENT
LACK_OF_TRANSPORTATION: NO
GROOMING: INDEPENDENT
HEARING - LEFT EAR: FUNCTIONAL
HEARING - LEFT EAR: FUNCTIONAL

## 2024-09-29 ASSESSMENT — PAIN DESCRIPTION - LOCATION: LOCATION: ABDOMEN

## 2024-09-29 ASSESSMENT — LIFESTYLE VARIABLES
SKIP TO QUESTIONS 9-10: 0
AUDIT-C TOTAL SCORE: 2
HOW OFTEN DO YOU HAVE 6 OR MORE DRINKS ON ONE OCCASION: LESS THAN MONTHLY
HOW MANY STANDARD DRINKS CONTAINING ALCOHOL DO YOU HAVE ON A TYPICAL DAY: 1 OR 2
AUDIT-C TOTAL SCORE: 2
HOW OFTEN DO YOU HAVE A DRINK CONTAINING ALCOHOL: MONTHLY OR LESS

## 2024-09-29 ASSESSMENT — PAIN SCALES - GENERAL
PAINLEVEL_OUTOF10: 0 - NO PAIN
PAINLEVEL_OUTOF10: 10 - WORST POSSIBLE PAIN

## 2024-09-29 ASSESSMENT — PAIN - FUNCTIONAL ASSESSMENT: PAIN_FUNCTIONAL_ASSESSMENT: 0-10

## 2024-09-29 NOTE — PROGRESS NOTES
Pharmacy Medication History Review    Per patient.     Sharla Ramirez is a 44 y.o. female admitted for No Principal Problem: There is no principal problem currently on the Problem List. Please update the Problem List and refresh.. Pharmacy reviewed the patient's [id number] medications and allergies for accuracy.    The list below reflectives the updated PTA list. Please review each medication in order reconciliation for additional clarification and justification.       The list below reflectives the updated allergy list. Please review each documented allergy for additional clarification and justification.  Allergies  Reviewed by Marjorie Staley RN on 9/20/2024        Severity Reactions Comments    Latex Medium Rash     Morphine Not Specified Hives, Itching swelling and itching            Below are additional concerns with the patient's PTA list.  Prior to Admission Medications   Prescriptions Last Dose Informant   DULoxetine (Cymbalta) 60 mg DR capsule 9/28/2024    Sig: Take 1 capsule (60 mg) by mouth once daily.   albuterol 2.5 mg /3 mL (0.083 %) nebulizer solution     Sig: Take 3 mL (2.5 mg) by nebulization every 4 hours if needed for wheezing or shortness of breath.   albuterol 90 mcg/actuation inhaler     Sig: Inhale 2 puffs every 6 hours if needed for wheezing.   amLODIPine (Norvasc) 5 mg tablet 9/28/2024    Sig: Take 1 tablet (5 mg) by mouth once daily. Do not start before October 18, 2023.   blood pressure monitor (Blood Pressure Kit) kit     Sig: Check your blood pressure as indicted daily prior to and after medications.   cetirizine (ZyrTEC) 10 mg tablet 9/28/2024    Sig: Take 1 tablet (10 mg) by mouth once daily.   cholestyramine (Questran) 4 gram packet     Sig: Take 2 packets (8 g) by mouth 3 times a day.   Patient taking differently: Take 2 packets (8 g) by mouth 3 times a day as needed.   cloNIDine (Catapres) 0.1 mg tablet 9/28/2024    Sig: Take 1 tablet (0.1 mg) by mouth once daily.    famotidine (Pepcid) 20 mg tablet     Sig: Take 1 tablet (20 mg) by mouth once daily at bedtime.   Patient taking differently: Take 1 tablet (20 mg) by mouth as needed at bedtime for indigestion or heartburn.   fluticasone (Flonase) 50 mcg/actuation nasal spray 9/28/2024    Sig: Administer 1 spray into each nostril once daily. Shake gently. Before first use, prime pump. After use, clean tip and replace cap.   fluticasone propion-salmeteroL (Advair Diskus) 100-50 mcg/dose diskus inhaler 9/28/2024    Sig: Inhale 1 puff once daily in the morning. Take before meals.   ketotifen (Zaditor) 0.025 % (0.035 %) ophthalmic solution 9/28/2024    Sig: Administer 1 drop into both eyes 2 times a day.   losartan (Cozaar) 100 mg tablet 9/28/2024    Sig: Take 1 tablet (100 mg) by mouth once daily.   magnesium chloride (Slow-Mag) 71.5 mg tablet,delayed release (DR/EC)     Sig: Take 1 tablet (71.5 mg) by mouth once every 24 hours.   magnesium oxide (Mag-Ox) 400 mg tablet 9/28/2024    Sig: Take 1 tablet (400 mg) by mouth once daily.   multivitamin with folic acid (DAILY-SANDRITA, WITH FOLIC ACID, ORAL) 9/28/2024    Sig: Take 1 tablet by mouth once daily.   norgestimate-ethinyl estradioL (Ortho Tri-Cyclen LO) 0.18/0.215/0.25 mg-25 mcg tablet 9/28/2024    Sig: Take 1 tablet by mouth once daily.   omeprazole (PriLOSEC) 20 mg DR capsule 9/28/2024    Sig: Take 1 capsule (20 mg) by mouth once daily. Do not crush or chew.   ondansetron ODT (Zofran-ODT) 4 mg disintegrating tablet     Sig: Take 1 tablet (4 mg) by mouth every 8 hours if needed for nausea or vomiting.   potassium chloride CR 20 mEq ER tablet 9/28/2024    Sig: Take 1 tablet (20 mEq) by mouth once daily. Do not crush or chew.   simethicone (Gas Relief, simethicone,) 180 mg capsule     Sig: Take 1 capsule (180 mg) by mouth 3 times a day as needed for flatulence.   spironolactone (Aldactone) 25 mg tablet     Sig: Take 1 tablet (25 mg) by mouth once daily.   tiZANidine (Zanaflex) 4 mg  tablet     Sig: Take 1 tablet (4 mg) by mouth 2 times a day as needed for muscle spasms.      Facility-Administered Medications: None        Selam Yu

## 2024-09-29 NOTE — ED PROVIDER NOTES
History of Present Illness     History provided by: Patient  Limitations to History: None  External Records Reviewed with Brief Summary: I reviewed the patient's ED note from 9/20/2024 where the patient was evaluated for abdominal cramping and shaking.  At that time the patient was noted to be hypomagnesemic as well.    HPI:  Sharla Ramirez is a 44 y.o. female with a past medical history of hypertension, cholecystectomy presenting to the emergency department today with concern for cramping and shaking the bilateral upper and lower extremities.  Patient states onset of symptoms was this morning.  She woke up not feeling well.  States that she did not take her antihypertensives or any other medications this morning as she was vomiting and not feeling well.  States the symptoms are very consistent with the symptoms she is experienced in the past when her magnesium and her potassium were low.  She also notes that she has been having episodes of diarrhea here and there.  States that she has an appointment with her primary care doctor on the 11th for further evaluation and management of her symptoms.  She denies any chest pain, shortness of breath, back pain.  Notes that she has some lower abdominal pain/pelvic pain and pain in both her legs.  She notes that she has chronic neuropathy in her legs and is being evaluated for that.  Her main concern today is the cramping and shaking that she is experiencing.  No other associate signs or symptoms report at this time.    Physical Exam   Triage vitals:  T 37.3 °C (99.2 °F)  HR (!) 102  BP (!) 137/101  RR (!) 21  O2 98 % None (Room air)    Physical Exam  Vitals and nursing note reviewed.   Constitutional:       General: She is in acute distress.   HENT:      Head: Normocephalic and atraumatic.   Eyes:      General: No scleral icterus.     Extraocular Movements: Extraocular movements intact.   Cardiovascular:      Rate and Rhythm: Regular rhythm. Tachycardia present.       Comments: 2+ radial pulses bilaterally, 2+ DP pulses and Femoral pulses bilaterally  Pulmonary:      Breath sounds: Normal breath sounds.      Comments: hyperventilating  Abdominal:      General: Abdomen is flat. Bowel sounds are normal. There is no distension or abdominal bruit. There are no signs of injury.      Palpations: Abdomen is soft. There is no mass.      Tenderness: There is no abdominal tenderness. There is no guarding or rebound.      Hernia: No hernia is present.   Skin:     General: Skin is warm and dry.      Capillary Refill: Capillary refill takes less than 2 seconds.   Neurological:      General: No focal deficit present.      Mental Status: She is alert.          Medical Decision Making & ED Course   Medical Decision Makin y.o. female with the above past medical she presenting to the emergency department with concern for low potassium and low magnesium.  Patient states that the symptoms are very similar to a symptoms she experiences when her labs are low.  On my examination the patient had cramping and shaking of bilateral upper and lower extremities.  Labs were drawn to evaluate for this.  Patient was found to have a low magnesium of 0.8 and magnesium infusion was started.  Patient's second EKG showed tachycardia with prolonged QTc at 501.  Phosphorus in addition to ionized calcium were also ordered which were both low as well.  Phosphorus was slightly hemolyzed and that is likely lower than on labs.  Patient CK level was normal.  Patient was also started on fluids.  Patient had not taken her antihypertensive medications at home and was given the initial doses here, I gave her Ativan, Zofran, fentanyl, clonidine in addition to LR bolus.  Patient was also given an initial dose of amlodipine.  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  Patient states that she has nephrology  appointment tomorrow.  Patient started to feel little bit better after receiving pain medications and magnesium infusion.  There was some concern about the patient's admission status however due to the patient's EKG showing prolonged QTc, with significantly low magnesium patient was admitted to stepdown on telemetry.  Patient admitted in stable condition.  Patient agreeable with plan.  ----      Differential diagnoses considered include but are not limited to: hypomag, hypoK, electrolyte imbalances     Social Determinants of Health which Significantly Impact Care: None identified     EKG Independent Interpretation: See ED Course    Independent Result Review and Interpretation: See ED course    Chronic conditions affecting the patient's care: See HPI    The patient was discussed with the following consultants/services: None    Care Considerations: See MDM    ED Course:  ED Course as of 09/29/24 1849   Sun Sep 29, 2024   1542 Has nephrology appointment on 09/30 for further evaluation and management of concern for liddleman syndrome [KD]   1605 EKG performed at 1335 showed rate of 100 with prolonged Qtc of 485. No peaked t-waves, no STEMI.  [KD]   1626  EKG performed at 1419 showed sinus tachycardia with a rate of 122.  Prolonged QTc of 501.  No ST elevation MI.  No peaked T waves. [KD]   1628 Emergency Medicine Attending Attestation:     Pt with chronic hypomagnesemia and hypokalemia,  here with leg tingling similar to when Mag is low.  She reports compliance with her magnesium.      EKG my independent interpretation:  sinus tachycardia 122, nl axis, qtc 501.      Mag IV infusion started.  4 grams ordered.  Will admit to telemetry under Dr. HERACLIO Daniels.    The patient was seen by the resident/fellow.  I have personally performed a substantive portion of the encounter.  I have seen and examined the patient; agree with the workup, evaluation, MDM, management and diagnosis.  The care plan has been discussed with the  resident; I have reviewed the resident's note and agree with the documented findings.        Saadia Nickerson MD   [JG]   1706 Spoke to Dr. LUIS Daniels who did not think patient needed to be step down or IP admission and recommended obs admission. I spoke to him about our concerns about the magnesium being 0.8 in addition to the Qtc being prolonged at 501. Patient started on Mag and calcium due to ionized calcium being low as well. [KD]      ED Course User Index  [JG] Saadia Nickerson MD  [KD] Yanet Cali DO         Diagnoses as of 09/29/24 1840   Hypomagnesemia     Disposition   As a result of their workup, the patient will require admission to the hospital.  The patient was informed of her diagnosis.  The patient was given the opportunity to ask questions and I answered them. The patient agreed to be admitted to the hospital.    Seen and discussed with ED Attending  Yanet Cali DO, PGY-2  Emergency Medicine     Yanet Cali DO  Resident  09/29/24 7521

## 2024-09-29 NOTE — ED TRIAGE NOTES
Pt presents to ED via EMS for complaints of abdominal pain and bilateral leg pain. States that she has episodes where she feels that her magnesium and potassium are low and she starts to get muscle tightness and contracted arms and legs. Endorses nausea and vomiting. Was seen in ED last week and was admitted for further workup. PMH of HTN and asthma. Hypertensive on arrival and has not taken meds today. Pt is A&Ox4.    Today was the patient's first session of cardiac rehab phase 2, the orientation, including exercise.  Further documentation will be scanned into the medical record upon discharge.

## 2024-09-30 ENCOUNTER — APPOINTMENT (OUTPATIENT)
Dept: CARDIOLOGY | Facility: HOSPITAL | Age: 44
End: 2024-09-30
Payer: COMMERCIAL

## 2024-09-30 LAB
ANION GAP SERPL CALC-SCNC: 15 MMOL/L (ref 10–20)
ATRIAL RATE: 100 BPM
ATRIAL RATE: 77 BPM
BUN SERPL-MCNC: 7 MG/DL (ref 6–23)
CALCIUM SERPL-MCNC: 7.9 MG/DL (ref 8.6–10.3)
CHLORIDE SERPL-SCNC: 97 MMOL/L (ref 98–107)
CO2 SERPL-SCNC: 28 MMOL/L (ref 21–32)
CREAT SERPL-MCNC: 0.65 MG/DL (ref 0.5–1.05)
EGFRCR SERPLBLD CKD-EPI 2021: >90 ML/MIN/1.73M*2
GLUCOSE SERPL-MCNC: 122 MG/DL (ref 74–99)
HOLD SPECIMEN: NORMAL
HOLD SPECIMEN: NORMAL
MAGNESIUM SERPL-MCNC: 1.5 MG/DL (ref 1.6–2.4)
P AXIS: 35 DEGREES
P AXIS: 54 DEGREES
P OFFSET: 199 MS
P OFFSET: 210 MS
P ONSET: 158 MS
P ONSET: 162 MS
POTASSIUM SERPL-SCNC: 3.3 MMOL/L (ref 3.5–5.3)
PR INTERVAL: 122 MS
PR INTERVAL: 122 MS
Q ONSET: 219 MS
Q ONSET: 223 MS
QRS COUNT: 12 BEATS
QRS COUNT: 16 BEATS
QRS DURATION: 70 MS
QRS DURATION: 84 MS
QT INTERVAL: 376 MS
QT INTERVAL: 424 MS
QTC CALCULATION(BAZETT): 479 MS
QTC CALCULATION(BAZETT): 485 MS
QTC FREDERICIA: 446 MS
QTC FREDERICIA: 460 MS
R AXIS: 7 DEGREES
R AXIS: 7 DEGREES
SODIUM SERPL-SCNC: 137 MMOL/L (ref 136–145)
T AXIS: 30 DEGREES
T AXIS: 32 DEGREES
T OFFSET: 411 MS
T OFFSET: 431 MS
VENTRICULAR RATE: 100 BPM
VENTRICULAR RATE: 77 BPM

## 2024-09-30 PROCEDURE — 2500000002 HC RX 250 W HCPCS SELF ADMINISTERED DRUGS (ALT 637 FOR MEDICARE OP, ALT 636 FOR OP/ED): Performed by: INTERNAL MEDICINE

## 2024-09-30 PROCEDURE — 2500000004 HC RX 250 GENERAL PHARMACY W/ HCPCS (ALT 636 FOR OP/ED): Performed by: INTERNAL MEDICINE

## 2024-09-30 PROCEDURE — 93005 ELECTROCARDIOGRAM TRACING: CPT

## 2024-09-30 PROCEDURE — 2500000002 HC RX 250 W HCPCS SELF ADMINISTERED DRUGS (ALT 637 FOR MEDICARE OP, ALT 636 FOR OP/ED)

## 2024-09-30 PROCEDURE — 2500000001 HC RX 250 WO HCPCS SELF ADMINISTERED DRUGS (ALT 637 FOR MEDICARE OP): Performed by: INTERNAL MEDICINE

## 2024-09-30 PROCEDURE — 36415 COLL VENOUS BLD VENIPUNCTURE: CPT | Performed by: INTERNAL MEDICINE

## 2024-09-30 PROCEDURE — 96367 TX/PROPH/DG ADDL SEQ IV INF: CPT

## 2024-09-30 PROCEDURE — 83735 ASSAY OF MAGNESIUM: CPT | Performed by: INTERNAL MEDICINE

## 2024-09-30 PROCEDURE — 2500000001 HC RX 250 WO HCPCS SELF ADMINISTERED DRUGS (ALT 637 FOR MEDICARE OP): Performed by: NURSE PRACTITIONER

## 2024-09-30 PROCEDURE — G0378 HOSPITAL OBSERVATION PER HR: HCPCS

## 2024-09-30 PROCEDURE — 2500000004 HC RX 250 GENERAL PHARMACY W/ HCPCS (ALT 636 FOR OP/ED): Mod: JZ | Performed by: PHARMACIST

## 2024-09-30 PROCEDURE — 80048 BASIC METABOLIC PNL TOTAL CA: CPT | Performed by: INTERNAL MEDICINE

## 2024-09-30 PROCEDURE — 96366 THER/PROPH/DIAG IV INF ADDON: CPT

## 2024-09-30 PROCEDURE — 99254 IP/OBS CNSLTJ NEW/EST MOD 60: CPT | Performed by: INTERNAL MEDICINE

## 2024-09-30 PROCEDURE — 94640 AIRWAY INHALATION TREATMENT: CPT | Mod: 76

## 2024-09-30 PROCEDURE — 96365 THER/PROPH/DIAG IV INF INIT: CPT | Mod: 59

## 2024-09-30 RX ORDER — MAGNESIUM SULFATE HEPTAHYDRATE 40 MG/ML
2 INJECTION, SOLUTION INTRAVENOUS ONCE
Status: COMPLETED | OUTPATIENT
Start: 2024-09-30 | End: 2024-09-30

## 2024-09-30 RX ORDER — TALC
6 POWDER (GRAM) TOPICAL NIGHTLY PRN
Status: DISCONTINUED | OUTPATIENT
Start: 2024-09-30 | End: 2024-10-01 | Stop reason: HOSPADM

## 2024-09-30 RX ORDER — ACETAMINOPHEN 325 MG/1
650 TABLET ORAL
Status: DISCONTINUED | OUTPATIENT
Start: 2024-09-30 | End: 2024-10-01 | Stop reason: HOSPADM

## 2024-09-30 RX ORDER — TRAMADOL HYDROCHLORIDE 50 MG/1
50 TABLET ORAL EVERY 6 HOURS PRN
Status: DISCONTINUED | OUTPATIENT
Start: 2024-09-30 | End: 2024-10-01 | Stop reason: HOSPADM

## 2024-09-30 ASSESSMENT — PAIN SCALES - GENERAL
PAINLEVEL_OUTOF10: 0 - NO PAIN
PAINLEVEL_OUTOF10: 8
PAINLEVEL_OUTOF10: 8

## 2024-09-30 ASSESSMENT — PAIN - FUNCTIONAL ASSESSMENT: PAIN_FUNCTIONAL_ASSESSMENT: 0-10

## 2024-09-30 ASSESSMENT — COGNITIVE AND FUNCTIONAL STATUS - GENERAL
HELP NEEDED FOR BATHING: A LITTLE
STANDING UP FROM CHAIR USING ARMS: A LITTLE
WALKING IN HOSPITAL ROOM: A LITTLE
DAILY ACTIVITIY SCORE: 23
MOBILITY SCORE: 19
CLIMB 3 TO 5 STEPS WITH RAILING: A LOT
MOVING TO AND FROM BED TO CHAIR: A LITTLE

## 2024-09-30 ASSESSMENT — PAIN DESCRIPTION - ORIENTATION: ORIENTATION: RIGHT;LEFT

## 2024-09-30 ASSESSMENT — ACTIVITIES OF DAILY LIVING (ADL): LACK_OF_TRANSPORTATION: NO

## 2024-09-30 ASSESSMENT — PAIN DESCRIPTION - LOCATION: LOCATION: LEG

## 2024-09-30 NOTE — PROGRESS NOTES
09/30/24 1533   Discharge Planning   Living Arrangements Spouse/significant other   Support Systems Spouse/significant other   Type of Residence Private residence   Home or Post Acute Services None   Expected Discharge Disposition Home   Financial Resource Strain   How hard is it for you to pay for the very basics like food, housing, medical care, and heating? Not very   Housing Stability   In the last 12 months, was there a time when you were not able to pay the mortgage or rent on time? N   At any time in the past 12 months, were you homeless or living in a shelter (including now)? N   Transportation Needs   In the past 12 months, has lack of transportation kept you from medical appointments or from getting medications? no   In the past 12 months, has lack of transportation kept you from meetings, work, or from getting things needed for daily living? No

## 2024-09-30 NOTE — CARE PLAN
The patient's goals for the shift include get better and discharge home     The clinical goals for the shift include Pt will remain free from falls and injuries

## 2024-09-30 NOTE — H&P
PRIMARY CARE PHYSICIAN:  Saadia Sexton, APRN-CNP ADMITTING PHYSICIAN Dwight Daniels MD   MRN# [id number]   Admission Date: 2024     Subjective   CHIEF COMPLAINT: Leg cramps.    HISTORY OF PRESENT ILLNESS Sharla Ramirez is a 44 y.o. female with a history of chronic hypomagnesemia presented with cramping in the legs abdomen and hands yesterday morning. Magnesium level was 0.8.  QTC was prolonged. She was seen in ED a week ago with low Magnesium. She had vomiting and diarrhea yesterday. 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        Past Surgical history  Past Surgical History:   Procedure Laterality Date     SECTION, LOW TRANSVERSE      CHOLECYSTECTOMY         Family History   Problem Relation Name Age of Onset    Hypertension Mother      Hypokalemia Mother      Cholelithiasis Mother      Constipation Mother      Bilateral breast cancer Mother         Social History     Socioeconomic History    Marital status: Single   Tobacco Use    Smoking status: Every Day     Current packs/day: 0.50     Average packs/day: 0.5 packs/day for 27.4 years (13.7 ttl pk-yrs)     Types: Cigarettes     Start date: 1997    Smokeless tobacco: Never   Substance and Sexual Activity    Alcohol use: Yes     Comment: socially on weekends    Drug use: Not Currently     Social Determinants of Health     Financial Resource Strain: Low Risk  (2024)    Overall Financial Resource Strain (CARDIA)     Difficulty of Paying Living Expenses: Not very hard   Food Insecurity: No Food Insecurity (2024)    Hunger Vital Sign     Worried About Running Out of Food in the Last Year: Never true     Ran Out of Food in the Last Year: Never  true   Transportation Needs: No Transportation Needs (9/29/2024)    PRAPARE - Transportation     Lack of Transportation (Medical): No     Lack of Transportation (Non-Medical): No   Physical Activity: Insufficiently Active (10/17/2023)    Exercise Vital Sign     Days of Exercise per Week: 1 day     Minutes of Exercise per Session: 10 min   Stress: No Stress Concern Present (7/6/2023)    Received from [place], [facility] Occupational Health - Occupational Stress Questionnaire     Feeling of Stress : Not at all   Social Connections: Feeling Socially Integrated (6/7/2024)    OASIS : Social Isolation     Frequency of experiencing loneliness or isolation: Never   Intimate Partner Violence: Not At Risk (9/29/2024)    Humiliation, Afraid, Rape, and Kick questionnaire     Fear of Current or Ex-Partner: No     Emotionally Abused: No     Physically Abused: No     Sexually Abused: No   Housing Stability: Low Risk  (9/29/2024)    Housing Stability Vital Sign     Unable to Pay for Housing in the Last Year: No     Number of Times Moved in the Last Year: 1     Homeless in the Last Year: No       Medications  Medications Prior to Admission   Medication Sig Dispense Refill Last Dose    amLODIPine (Norvasc) 5 mg tablet Take 1 tablet (5 mg) by mouth once daily. Do not start before October 18, 2023. 30 tablet 0 9/28/2024    cetirizine (ZyrTEC) 10 mg tablet Take 1 tablet (10 mg) by mouth once daily.   9/28/2024    cloNIDine (Catapres) 0.1 mg tablet Take 1 tablet (0.1 mg) by mouth once daily.   9/28/2024    DULoxetine (Cymbalta) 60 mg DR capsule Take 1 capsule (60 mg) by mouth once daily.   9/28/2024    fluticasone (Flonase) 50 mcg/actuation nasal spray Administer 1 spray into each nostril once daily. Shake gently. Before first use, prime pump. After use, clean tip and replace cap.   9/28/2024    fluticasone propion-salmeteroL (Advair Diskus) 100-50 mcg/dose diskus inhaler Inhale 1 puff once daily in the  morning. Take before meals.   9/28/2024    ketotifen (Zaditor) 0.025 % (0.035 %) ophthalmic solution Administer 1 drop into both eyes 2 times a day.   9/28/2024    losartan (Cozaar) 100 mg tablet Take 1 tablet (100 mg) by mouth once daily.   9/28/2024    magnesium oxide (Mag-Ox) 400 mg tablet Take 1 tablet (400 mg) by mouth once daily. 30 tablet 0 9/28/2024    multivitamin with folic acid (DAILY-SANDRITA, WITH FOLIC ACID, ORAL) Take 1 tablet by mouth once daily.   9/28/2024    norgestimate-ethinyl estradioL (Ortho Tri-Cyclen LO) 0.18/0.215/0.25 mg-25 mcg tablet Take 1 tablet by mouth once daily.   9/28/2024    omeprazole (PriLOSEC) 20 mg DR capsule Take 1 capsule (20 mg) by mouth once daily. Do not crush or chew.   9/28/2024    potassium chloride CR 20 mEq ER tablet Take 1 tablet (20 mEq) by mouth once daily. Do not crush or chew.   9/28/2024    albuterol 2.5 mg /3 mL (0.083 %) nebulizer solution Take 3 mL (2.5 mg) by nebulization every 4 hours if needed for wheezing or shortness of breath.       albuterol 90 mcg/actuation inhaler Inhale 2 puffs every 6 hours if needed for wheezing.       blood pressure monitor (Blood Pressure Kit) kit Check your blood pressure as indicted daily prior to and after medications. 1 kit 0     cholestyramine (Questran) 4 gram packet Take 2 packets (8 g) by mouth 3 times a day. (Patient taking differently: Take 2 packets (8 g) by mouth 3 times a day as needed.) 180 packet 0     famotidine (Pepcid) 20 mg tablet Take 1 tablet (20 mg) by mouth once daily at bedtime. (Patient taking differently: Take 1 tablet (20 mg) by mouth as needed at bedtime for indigestion or heartburn.) 30 tablet 0     magnesium chloride (Slow-Mag) 71.5 mg tablet,delayed release (DR/EC) Take 1 tablet (71.5 mg) by mouth once every 24 hours. 30 tablet 0     ondansetron ODT (Zofran-ODT) 4 mg disintegrating tablet Take 1 tablet (4 mg) by mouth every 8 hours if needed for nausea or vomiting.       simethicone (Gas Relief,  simethicone,) 180 mg capsule Take 1 capsule (180 mg) by mouth 3 times a day as needed for flatulence.       spironolactone (Aldactone) 25 mg tablet Take 1 tablet (25 mg) by mouth once daily. 30 tablet 0     tiZANidine (Zanaflex) 4 mg tablet Take 1 tablet (4 mg) by mouth 2 times a day as needed for muscle spasms.           Allergies   Allergen Reactions    Latex Rash    Morphine Hives and Itching     swelling and itching       Complete Review of symptoms  GENERAL: No, fever, or chills  HEENT: No, blurred vision, or hearing loss  CARDIAC: No chest pain, shortness of breath, palpitations or leg swelling  RESPIRATORY: No, cough, or hemoptysis  GI:  see H&P.  : No or dysuria  SKIN: no rash, itching discoloration, jaundice or rash  ENDOCRINE: no or polydipsia  PSYCH: No mood disorder hallucinations or psychiatric symptoms  NEURO: No, blurred vision, slurred speech, facial droop, or word finding difficulties    Objective     PHYSICAL EXAM:  Patient Vitals for the past 24 hrs:   BP Temp Temp src Pulse Resp SpO2 Height Weight   09/30/24 0700 102/81 36.5 °C (97.7 °F) Temporal 73 16 98 % -- --   09/30/24 0410 98/75 36.2 °C (97.2 °F) Temporal 70 14 96 % -- --   09/29/24 2249 129/88 36.5 °C (97.7 °F) Temporal 92 16 95 % -- --   09/29/24 2145 120/85 -- -- -- -- 94 % -- --   09/29/24 2130 118/83 -- -- -- -- 96 % -- --   09/29/24 2115 117/84 -- -- -- -- 95 % -- --   09/29/24 2100 114/85 -- -- -- -- 95 % -- --   09/29/24 2045 118/88 -- -- -- -- 97 % -- --   09/29/24 2030 123/89 -- -- -- -- 96 % -- --   09/29/24 2015 123/84 -- -- 90 -- 95 % -- --   09/29/24 1945 100/89 -- -- 92 17 96 % -- --   09/29/24 1930 -- -- -- 97 14 95 % -- --   09/29/24 1915 112/77 -- -- 92 (!) 21 94 % -- --   09/29/24 1900 116/82 -- -- 95 (!) 28 95 % -- --   09/29/24 1845 121/81 -- -- 97 (!) 26 (!) 93 % -- --   09/29/24 1830 117/78 -- -- 97 (!) 23 94 % -- --   09/29/24 1815 118/74 -- -- 100 20 (!) 93 % -- --   09/29/24 1800 116/80 -- -- 99 (!) 24 94 % -- --  "  09/29/24 1745 117/78 -- -- 98 (!) 21 (!) 93 % -- --   09/29/24 1630 127/86 -- -- 100 (!) 21 95 % -- --   09/29/24 1615 124/84 -- -- (!) 106 (!) 29 95 % -- --   09/29/24 1600 (!) 109/99 -- -- (!) 103 -- 97 % -- --   09/29/24 1545 (!) 134/92 -- -- (!) 104 -- 95 % -- --   09/29/24 1530 (!) 134/94 -- -- (!) 106 (!) 27 (!) 93 % -- --   09/29/24 1515 140/87 -- -- (!) 107 16 (!) 93 % -- --   09/29/24 1500 (!) 139/94 -- -- (!) 103 (!) 22 (!) 93 % -- --   09/29/24 1445 -- -- -- (!) 101 (!) 25 96 % -- --   09/29/24 1430 -- -- -- (!) 116 (!) 25 96 % -- --   09/29/24 1415 -- -- -- (!) 142 20 96 % -- --   09/29/24 1400 (!) 133/95 -- -- (!) 106 20 97 % -- --   09/29/24 1345 (!) 139/109 -- -- 97 14 97 % -- --   09/29/24 1336 (!) 137/101 37.3 °C (99.2 °F) Oral (!) 102 (!) 21 98 % 1.702 m (5' 7\") 71.2 kg (157 lb)     Blood pressure 102/81, pulse 73, temperature 36.5 °C (97.7 °F), temperature source Temporal, resp. rate 16, height 1.702 m (5' 7\"), weight 71.2 kg (157 lb), SpO2 98%.  Patient Vitals for the past 24 hrs:   BP Temp Temp src Pulse Resp SpO2 Height Weight   09/30/24 0700 102/81 36.5 °C (97.7 °F) Temporal 73 16 98 % -- --   09/30/24 0410 98/75 36.2 °C (97.2 °F) Temporal 70 14 96 % -- --   09/29/24 2249 129/88 36.5 °C (97.7 °F) Temporal 92 16 95 % -- --   09/29/24 2145 120/85 -- -- -- -- 94 % -- --   09/29/24 2130 118/83 -- -- -- -- 96 % -- --   09/29/24 2115 117/84 -- -- -- -- 95 % -- --   09/29/24 2100 114/85 -- -- -- -- 95 % -- --   09/29/24 2045 118/88 -- -- -- -- 97 % -- --   09/29/24 2030 123/89 -- -- -- -- 96 % -- --   09/29/24 2015 123/84 -- -- 90 -- 95 % -- --   09/29/24 1945 100/89 -- -- 92 17 96 % -- --   09/29/24 1930 -- -- -- 97 14 95 % -- --   09/29/24 1915 112/77 -- -- 92 (!) 21 94 % -- --   09/29/24 1900 116/82 -- -- 95 (!) 28 95 % -- --   09/29/24 1845 121/81 -- -- 97 (!) 26 (!) 93 % -- --   09/29/24 1830 117/78 -- -- 97 (!) 23 94 % -- --   09/29/24 1815 118/74 -- -- 100 20 (!) 93 % -- --   09/29/24 1800 " "116/80 -- -- 99 (!) 24 94 % -- --   09/29/24 1745 117/78 -- -- 98 (!) 21 (!) 93 % -- --   09/29/24 1630 127/86 -- -- 100 (!) 21 95 % -- --   09/29/24 1615 124/84 -- -- (!) 106 (!) 29 95 % -- --   09/29/24 1600 (!) 109/99 -- -- (!) 103 -- 97 % -- --   09/29/24 1545 (!) 134/92 -- -- (!) 104 -- 95 % -- --   09/29/24 1530 (!) 134/94 -- -- (!) 106 (!) 27 (!) 93 % -- --   09/29/24 1515 140/87 -- -- (!) 107 16 (!) 93 % -- --   09/29/24 1500 (!) 139/94 -- -- (!) 103 (!) 22 (!) 93 % -- --   09/29/24 1445 -- -- -- (!) 101 (!) 25 96 % -- --   09/29/24 1430 -- -- -- (!) 116 (!) 25 96 % -- --   09/29/24 1415 -- -- -- (!) 142 20 96 % -- --   09/29/24 1400 (!) 133/95 -- -- (!) 106 20 97 % -- --   09/29/24 1345 (!) 139/109 -- -- 97 14 97 % -- --   09/29/24 1336 (!) 137/101 37.3 °C (99.2 °F) Oral (!) 102 (!) 21 98 % 1.702 m (5' 7\") 71.2 kg (157 lb)     Body mass index is 24.59 kg/m².  GENERAL: alert, cooperative, or no distress  SKIN: no rashes  HEENT Atraumatic, Normocephalic and Not pale, no icterus  OROPHARYNX:   NECK: supple, no thyromegaly, JVP within normal limits  LUNGS:  not in respiratory distress, respiratory rate normal, clear to auscultation  CARDIAC: rate regular and regular rhythm, normal S1 and S2, no murmur, rub, or gallop heard.  ABDOMEN: Soft, non-tender, normal bowel sounds; no bruits, organomegaly or masses.  EXTREMETIES: No edema  NEURO: Alert and oriented x 3,   ., reflexes normal and symmetric, strength and  sensation grossly normal    DATA:   Diagnostic tests reviewed for today's visit:    Most recent  labs and imaging results  Results for orders placed or performed during the hospital encounter of 09/29/24 (from the past 96 hour(s))   CBC and Auto Differential   Result Value Ref Range    WBC 5.8 4.4 - 11.3 x10*3/uL    nRBC 0.0 0.0 - 0.0 /100 WBCs    RBC 3.27 (L) 4.00 - 5.20 x10*6/uL    Hemoglobin 11.5 (L) 12.0 - 16.0 g/dL    Hematocrit 33.9 (L) 36.0 - 46.0 %     (H) 80 - 100 fL    MCH 35.2 (H) 26.0 - " 34.0 pg    MCHC 33.9 32.0 - 36.0 g/dL    RDW 14.1 11.5 - 14.5 %    Platelets 194 150 - 450 x10*3/uL    Neutrophils % 74.6 40.0 - 80.0 %    Immature Granulocytes %, Automated 0.3 0.0 - 0.9 %    Lymphocytes % 15.2 13.0 - 44.0 %    Monocytes % 9.2 2.0 - 10.0 %    Eosinophils % 0.0 0.0 - 6.0 %    Basophils % 0.7 0.0 - 2.0 %    Neutrophils Absolute 4.31 1.20 - 7.70 x10*3/uL    Immature Granulocytes Absolute, Automated 0.02 0.00 - 0.70 x10*3/uL    Lymphocytes Absolute 0.88 (L) 1.20 - 4.80 x10*3/uL    Monocytes Absolute 0.53 0.10 - 1.00 x10*3/uL    Eosinophils Absolute 0.00 0.00 - 0.70 x10*3/uL    Basophils Absolute 0.04 0.00 - 0.10 x10*3/uL   Magnesium   Result Value Ref Range    Magnesium 0.80 (L) 1.60 - 2.40 mg/dL   Creatine Kinase   Result Value Ref Range    Creatine Kinase 92 0 - 215 U/L   Comprehensive metabolic panel   Result Value Ref Range    Glucose 130 (H) 74 - 99 mg/dL    Sodium 140 136 - 145 mmol/L    Potassium 4.2 3.5 - 5.3 mmol/L    Chloride 95 (L) 98 - 107 mmol/L    Bicarbonate 26 21 - 32 mmol/L    Anion Gap 23 (H) 10 - 20 mmol/L    Urea Nitrogen 9 6 - 23 mg/dL    Creatinine 0.78 0.50 - 1.05 mg/dL    eGFR >90 >60 mL/min/1.73m*2    Calcium 8.0 (L) 8.6 - 10.3 mg/dL    Albumin 3.7 3.4 - 5.0 g/dL    Alkaline Phosphatase 225 (H) 33 - 110 U/L    Total Protein 6.8 6.4 - 8.2 g/dL     (H) 9 - 39 U/L    Bilirubin, Total 1.5 (H) 0.0 - 1.2 mg/dL     (H) 7 - 45 U/L   Phosphorus   Result Value Ref Range    Phosphorus 1.5 (L) 2.5 - 4.9 mg/dL   Calcium, Ionized   Result Value Ref Range    POCT Calcium, Ionized 0.99 (L) 1.1 - 1.33 mmol/L        No results found.   [unfilled]   Medication and Non-Pharmacologic VTE Prophylaxis/Anticoagulants   Last Anticoag Admin            No anticoagulants administered    No unadministered anticoagulant orders found.            Assessment/Plan     Sharla Ramirez  has    Principal Problem:    Hypomagnesemia    Prolonged QT    HTN   Replace  Expect to Improve   Other Hospital  problems        Abnormal findings not addressed during hospitalization, but require out patient follow up. None        I spent 75 minutes taking history and examining Sharla Ramirez, reviewing the labs & imaging results, reviewing past hospital encounters,  speaking with & reviewing notes from emergency room physician.  SIGNATURE: Dwight Daniels MD PATIENT NAME: Sharla Ramirez   DATE: September 30, 2024 MRN: [id number]   TIME: 8:21 AM

## 2024-09-30 NOTE — CONSULTS
CONSULT: NEPHROLOGY SERVICE    REASON FOR CONSULT: Hypomagnesemia   Admit Date: 2024  1:20 PM       HPI: Patient is a 44 y.o. female admitted 2024 with h/o recurrent hypokalemia, hypomagnesemia chronically, before on oral supplementation, asthma, anxiety, CRPS type I of LLE, hypertension   Pt came with cramps, shacking, consulted for hypomagnesemia   She is a poor historian, I don't know is she was taking supplements  She admits having 3 BMs a day, soft, poor po intake   Extensive work up before, including urine studies, aldosterone, renin, no signs of renal wasting, RTA, etc    Past Medical History:   Diagnosis Date    Allergic rhinitis 2007    Essential hypertension 10/06/2003    Dx at 19    Mild persistent asthma ([facility]) 10/06/2003    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 She started smoking about 27 years ago. She has a 13.7 pack-year smoking history. She has never used smokeless tobacco. She reports current alcohol use. She reports that she does not currently use drugs.    Review of Systems  As above     CURRENT HOSP MEDS:    Current Facility-Administered Medications:     albuterol 2.5 mg /3 mL (0.083 %) nebulizer solution 2.5 mg, 2.5 mg, nebulization, q2h PRN, Dwight Daniels MD    amLODIPine (Norvasc) tablet 5 mg, 5 mg, oral, Daily, Dwight Daniels MD, 5 mg at 24 0838    budesonide (Pulmicort) 0.5 mg/2 mL nebulizer solution 0.5 mg, 0.5 mg, nebulization, BID, 0.5 mg at 24 0825 **AND** formoterol (Perforomist) 20 mcg/2 mL nebulizer solution 20 mcg,  20 mcg, nebulization, BID, Portia Senior PharmD, 20 mcg at 09/30/24 0825    cetirizine (ZyrTEC) tablet 10 mg, 10 mg, oral, Daily, Dwight Daniels MD, 10 mg at 09/30/24 0838    cholestyramine light (Prevalite) 4 gram packet 8 g, 8 g, oral, TID, Dwight Daniels MD, 8 g at 09/30/24 0839    cloNIDine (Catapres) tablet 0.1 mg, 0.1 mg, oral, Daily, Dwight Daniels MD, 0.1 mg at 09/30/24 0838    DULoxetine (Cymbalta) DR capsule 60 mg, 60 mg, oral, Daily, Dwight Daniels MD, 60 mg at 09/30/24 0838    famotidine (Pepcid) tablet 20 mg, 20 mg, oral, Nightly, Dwight Daniels MD, 20 mg at 09/29/24 2355    ketotifen (Zaditor) 0.025 % (0.035 %) ophthalmic solution 1 drop, 1 drop, Both Eyes, BID, Dwight Daniels MD    losartan (Cozaar) tablet 100 mg, 100 mg, oral, Daily, Dwight Daniels MD, 100 mg at 09/30/24 0839    magnesium oxide (Mag-Ox) tablet 800 mg, 800 mg, oral, BID, Dwight Daniels MD, 800 mg at 09/30/24 0838    melatonin tablet 6 mg, 6 mg, oral, Nightly PRN, CIRO Alcala-CNP, 6 mg at 09/30/24 0109    norgestimate-ethinyl estradioL (Ortho Tri-Cyclen LO) 0.18/0.215/0.25 mg-25 mcg per tablet - PATIENT'S OWN MEDICATION, 1 tablet, oral, Daily, Dwight Daniels MD    ondansetron ODT (Zofran-ODT) disintegrating tablet 4 mg, 4 mg, oral, q8h PRN, Dwight Daniels MD    pantoprazole (ProtoNix) EC tablet 40 mg, 40 mg, oral, Daily before breakfast, Dwight Daniels MD, 40 mg at 09/30/24 0840    polyethylene glycol (Glycolax, Miralax) packet 17 g, 17 g, oral, Daily, Dwight Daniels MD    potassium chloride CR (Klor-Con M20) ER tablet 20 mEq, 20 mEq, oral, Daily, Dwight Daniels MD, 20 mEq at 09/30/24 0838    simethicone (Mylicon) chewable tablet 80 mg, 80 mg, oral, 4x daily PRN, Dwight Daniels MD    spironolactone (Aldactone) tablet 25 mg, 25 mg, oral, Daily, Dwight Daniels MD, 25 mg at 09/30/24 0839    tiZANidine (Zanaflex) tablet 4 mg, 4 mg, oral, BID PRN,  "Dwight Daniels MD, 4 mg at 09/29/24 9533     PHYSICAL EXAM:  /81   Pulse 73   Temp 36.5 °C (97.7 °F) (Temporal)   Resp 16   Ht 1.702 m (5' 7\")   Wt 71.2 kg (157 lb)   SpO2 97%   BMI 24.59 kg/m²     Intake/Output Summary (Last 24 hours) at 9/30/2024 1203  Last data filed at 9/29/2024 2331  Gross per 24 hour   Intake 1100 ml   Output --   Net 1100 ml     Gen: AAO, NAD  Neck: No JVD  Cardiac: RRR  Resp: clear BS  Abd: Soft, non tender, +BS, non distended   Ext: No edema   Neuro: moves 4 ext  Peripheral Pulses: Capillary refill <2secs, strong peripheral pulses.  Skin: Skin color, texture, turgor normal, no suspicious rashes or lesions.    LABS:   Results for orders placed or performed during the hospital encounter of 09/29/24 (from the past 24 hour(s))   Electrocardiogram, 12-lead PRN ACS symptoms   Result Value Ref Range    Ventricular Rate 100 BPM    Atrial Rate 100 BPM    AR Interval 122 ms    QRS Duration 70 ms    QT Interval 376 ms    QTC Calculation(Bazett) 485 ms    P Axis 54 degrees    R Axis 7 degrees    T Axis 32 degrees    QRS Count 16 beats    Q Onset 223 ms    P Onset 162 ms    P Offset 210 ms    T Offset 411 ms    QTC Fredericia 446 ms   CBC and Auto Differential   Result Value Ref Range    WBC 5.8 4.4 - 11.3 x10*3/uL    nRBC 0.0 0.0 - 0.0 /100 WBCs    RBC 3.27 (L) 4.00 - 5.20 x10*6/uL    Hemoglobin 11.5 (L) 12.0 - 16.0 g/dL    Hematocrit 33.9 (L) 36.0 - 46.0 %     (H) 80 - 100 fL    MCH 35.2 (H) 26.0 - 34.0 pg    MCHC 33.9 32.0 - 36.0 g/dL    RDW 14.1 11.5 - 14.5 %    Platelets 194 150 - 450 x10*3/uL    Neutrophils % 74.6 40.0 - 80.0 %    Immature Granulocytes %, Automated 0.3 0.0 - 0.9 %    Lymphocytes % 15.2 13.0 - 44.0 %    Monocytes % 9.2 2.0 - 10.0 %    Eosinophils % 0.0 0.0 - 6.0 %    Basophils % 0.7 0.0 - 2.0 %    Neutrophils Absolute 4.31 1.20 - 7.70 x10*3/uL    Immature Granulocytes Absolute, Automated 0.02 0.00 - 0.70 x10*3/uL    Lymphocytes Absolute 0.88 (L) 1.20 - 4.80 " x10*3/uL    Monocytes Absolute 0.53 0.10 - 1.00 x10*3/uL    Eosinophils Absolute 0.00 0.00 - 0.70 x10*3/uL    Basophils Absolute 0.04 0.00 - 0.10 x10*3/uL   Magnesium   Result Value Ref Range    Magnesium 0.80 (L) 1.60 - 2.40 mg/dL   Creatine Kinase   Result Value Ref Range    Creatine Kinase 92 0 - 215 U/L   Comprehensive metabolic panel   Result Value Ref Range    Glucose 130 (H) 74 - 99 mg/dL    Sodium 140 136 - 145 mmol/L    Potassium 4.2 3.5 - 5.3 mmol/L    Chloride 95 (L) 98 - 107 mmol/L    Bicarbonate 26 21 - 32 mmol/L    Anion Gap 23 (H) 10 - 20 mmol/L    Urea Nitrogen 9 6 - 23 mg/dL    Creatinine 0.78 0.50 - 1.05 mg/dL    eGFR >90 >60 mL/min/1.73m*2    Calcium 8.0 (L) 8.6 - 10.3 mg/dL    Albumin 3.7 3.4 - 5.0 g/dL    Alkaline Phosphatase 225 (H) 33 - 110 U/L    Total Protein 6.8 6.4 - 8.2 g/dL     (H) 9 - 39 U/L    Bilirubin, Total 1.5 (H) 0.0 - 1.2 mg/dL     (H) 7 - 45 U/L   Phosphorus   Result Value Ref Range    Phosphorus 1.5 (L) 2.5 - 4.9 mg/dL   Calcium, Ionized   Result Value Ref Range    POCT Calcium, Ionized 0.99 (L) 1.1 - 1.33 mmol/L   Magnesium   Result Value Ref Range    Magnesium 1.50 (L) 1.60 - 2.40 mg/dL   Basic Metabolic Panel   Result Value Ref Range    Glucose 122 (H) 74 - 99 mg/dL    Sodium 137 136 - 145 mmol/L    Potassium 3.3 (L) 3.5 - 5.3 mmol/L    Chloride 97 (L) 98 - 107 mmol/L    Bicarbonate 28 21 - 32 mmol/L    Anion Gap 15 10 - 20 mmol/L    Urea Nitrogen 7 6 - 23 mg/dL    Creatinine 0.65 0.50 - 1.05 mg/dL    eGFR >90 >60 mL/min/1.73m*2    Calcium 7.9 (L) 8.6 - 10.3 mg/dL   Lavender Top   Result Value Ref Range    Extra Tube Hold for add-ons.    SST TOP   Result Value Ref Range    Extra Tube Hold for add-ons.    ECG 12 lead   Result Value Ref Range    Ventricular Rate 77 BPM    Atrial Rate 77 BPM    RI Interval 122 ms    QRS Duration 84 ms    QT Interval 424 ms    QTC Calculation(Bazett) 479 ms    P Axis 35 degrees    R Axis 7 degrees    T Axis 30 degrees    QRS Count  12 beats    Q Onset 219 ms    P Onset 158 ms    P Offset 199 ms    T Offset 431 ms    QTC Fredericia 460 ms       DATA:   Diagnostic tests reviewed for today's visit:    Labs and meds    ASSESSMENT AND PLAN:  - Hypomagnesemia: chronic, no apparent renal cause, due to GI looses?, poor absorption?  Apparently not on PPI  Mg is better after iv suppl  Concomitant mild hypocalcemia, normal PTH a few months ago    PLAN:  - restart oral magnesium, check vit D level and replete if necessary. I don't see need of repeating work up and urine studies would be of little use with Mg at near normal levels. Pt needs to stay complaint with Mg suppl, check levels in OP setting. Will sign off     Greatly appreciate the opportunity to assist in the care of this patient. Will continue to follow.     Signature: Isaias Bauer MD  Division of Nephrology and Hypertension

## 2024-09-30 NOTE — CARE PLAN
The patient's goals for the shift include  get better especially legs    The clinical goals for the shift include Pt will be free from injury and fall    Over the shift, the patient did not make progress toward the following goals. Barriers to progression include NA. Recommendations to address these barriers include NA.    Problem: Chronic Conditions and Co-morbidities  Goal: Patient's chronic conditions and co-morbidity symptoms are monitored and maintained or improved  Outcome: Not Progressing     Problem: Nutrition  Goal: Oral intake greater than 50%  Outcome: Not Progressing  Goal: Oral intake greater 75%  Outcome: Not Progressing  Goal: Adequate PO fluid intake  Outcome: Not Progressing  Goal: Nutrition support goals are met within 48 hrs  Outcome: Not Progressing  Goal: Tube feed tolerance  Outcome: Not Progressing  Goal: BG  mg/dL  Outcome: Not Progressing  Goal: Improve ostomy output  Outcome: Not Progressing

## 2024-10-01 ENCOUNTER — PHARMACY VISIT (OUTPATIENT)
Dept: PHARMACY | Facility: CLINIC | Age: 44
End: 2024-10-01
Payer: MEDICAID

## 2024-10-01 VITALS
HEIGHT: 67 IN | BODY MASS INDEX: 24.64 KG/M2 | DIASTOLIC BLOOD PRESSURE: 89 MMHG | HEART RATE: 75 BPM | SYSTOLIC BLOOD PRESSURE: 128 MMHG | RESPIRATION RATE: 17 BRPM | WEIGHT: 157 LBS | TEMPERATURE: 97.2 F | OXYGEN SATURATION: 97 %

## 2024-10-01 LAB
ANION GAP SERPL CALC-SCNC: 14 MMOL/L (ref 10–20)
ATRIAL RATE: 80 BPM
BUN SERPL-MCNC: 4 MG/DL (ref 6–23)
CALCIUM SERPL-MCNC: 7.9 MG/DL (ref 8.6–10.3)
CHLORIDE SERPL-SCNC: 99 MMOL/L (ref 98–107)
CO2 SERPL-SCNC: 26 MMOL/L (ref 21–32)
CREAT SERPL-MCNC: 0.56 MG/DL (ref 0.5–1.05)
EGFRCR SERPLBLD CKD-EPI 2021: >90 ML/MIN/1.73M*2
GLUCOSE SERPL-MCNC: 104 MG/DL (ref 74–99)
HOLD SPECIMEN: NORMAL
MAGNESIUM SERPL-MCNC: 1.4 MG/DL (ref 1.6–2.4)
P AXIS: 54 DEGREES
P OFFSET: 204 MS
P ONSET: 151 MS
POTASSIUM SERPL-SCNC: 4 MMOL/L (ref 3.5–5.3)
PR INTERVAL: 138 MS
Q ONSET: 220 MS
QRS COUNT: 13 BEATS
QRS DURATION: 80 MS
QT INTERVAL: 414 MS
QTC CALCULATION(BAZETT): 477 MS
QTC FREDERICIA: 456 MS
R AXIS: 4 DEGREES
SODIUM SERPL-SCNC: 135 MMOL/L (ref 136–145)
T AXIS: 26 DEGREES
T OFFSET: 427 MS
VENTRICULAR RATE: 80 BPM

## 2024-10-01 PROCEDURE — 83735 ASSAY OF MAGNESIUM: CPT | Performed by: INTERNAL MEDICINE

## 2024-10-01 PROCEDURE — G0378 HOSPITAL OBSERVATION PER HR: HCPCS

## 2024-10-01 PROCEDURE — 80048 BASIC METABOLIC PNL TOTAL CA: CPT | Performed by: INTERNAL MEDICINE

## 2024-10-01 PROCEDURE — 96376 TX/PRO/DX INJ SAME DRUG ADON: CPT

## 2024-10-01 PROCEDURE — 2500000002 HC RX 250 W HCPCS SELF ADMINISTERED DRUGS (ALT 637 FOR MEDICARE OP, ALT 636 FOR OP/ED): Performed by: INTERNAL MEDICINE

## 2024-10-01 PROCEDURE — 2500000001 HC RX 250 WO HCPCS SELF ADMINISTERED DRUGS (ALT 637 FOR MEDICARE OP): Performed by: INTERNAL MEDICINE

## 2024-10-01 PROCEDURE — 36415 COLL VENOUS BLD VENIPUNCTURE: CPT | Performed by: INTERNAL MEDICINE

## 2024-10-01 PROCEDURE — RXMED WILLOW AMBULATORY MEDICATION CHARGE

## 2024-10-01 PROCEDURE — 2500000004 HC RX 250 GENERAL PHARMACY W/ HCPCS (ALT 636 FOR OP/ED): Performed by: INTERNAL MEDICINE

## 2024-10-01 RX ORDER — LANOLIN ALCOHOL/MO/W.PET/CERES
800 CREAM (GRAM) TOPICAL 3 TIMES DAILY
Qty: 180 TABLET | Refills: 1 | Status: SHIPPED | OUTPATIENT
Start: 2024-10-01

## 2024-10-01 RX ORDER — SPIRONOLACTONE 25 MG/1
25 TABLET ORAL DAILY
Qty: 30 TABLET | Refills: 0 | Status: SHIPPED | OUTPATIENT
Start: 2024-10-01 | End: 2024-10-31

## 2024-10-01 RX ORDER — MAGNESIUM SULFATE HEPTAHYDRATE 40 MG/ML
4 INJECTION, SOLUTION INTRAVENOUS ONCE
Status: COMPLETED | OUTPATIENT
Start: 2024-10-01 | End: 2024-10-01

## 2024-10-01 RX ORDER — LANOLIN ALCOHOL/MO/W.PET/CERES
800 CREAM (GRAM) TOPICAL 3 TIMES DAILY
Status: DISCONTINUED | OUTPATIENT
Start: 2024-10-01 | End: 2024-10-01 | Stop reason: HOSPADM

## 2024-10-01 ASSESSMENT — COGNITIVE AND FUNCTIONAL STATUS - GENERAL
CLIMB 3 TO 5 STEPS WITH RAILING: A LOT
WALKING IN HOSPITAL ROOM: A LITTLE
STANDING UP FROM CHAIR USING ARMS: A LITTLE
MOVING TO AND FROM BED TO CHAIR: A LITTLE
HELP NEEDED FOR BATHING: A LITTLE
DAILY ACTIVITIY SCORE: 23
MOBILITY SCORE: 19
CLIMB 3 TO 5 STEPS WITH RAILING: A LOT
WALKING IN HOSPITAL ROOM: A LITTLE
MOBILITY SCORE: 19
HELP NEEDED FOR BATHING: A LITTLE
MOVING TO AND FROM BED TO CHAIR: A LITTLE
DAILY ACTIVITIY SCORE: 23
STANDING UP FROM CHAIR USING ARMS: A LITTLE

## 2024-10-01 ASSESSMENT — PAIN SCALES - GENERAL
PAINLEVEL_OUTOF10: 9
PAINLEVEL_OUTOF10: 5 - MODERATE PAIN
PAINLEVEL_OUTOF10: 10 - WORST POSSIBLE PAIN
PAINLEVEL_OUTOF10: 9

## 2024-10-01 ASSESSMENT — PAIN DESCRIPTION - LOCATION: LOCATION: LEG

## 2024-10-01 ASSESSMENT — PAIN DESCRIPTION - ORIENTATION: ORIENTATION: LEFT;RIGHT

## 2024-10-01 NOTE — CONSULTS
"Nutrition Assessment Note  Nutrition Assessment      Reason for Assessment  Reason for Assessment: Admission nursing screening  Admitted for hypomagnesemia.  MST score of 3 for weight loss & poor appetite.  Significant weight loss identified and inadequate oral intake r/t chronic nausea.  Is being discharge today, encouraged her to inquire about help with Creon cost.    History:  Food and Nutrient History  Energy Intake: Fair 50-75 %, Poor < 50 %  Food and Nutrient History: Reports intake varies.  Tries to limit fat intake and eats mostly vegetables & fruit.  Doesn't tolerate meat well.  Has been taking Creon with meals  2/2  high price. Avoids cow's milk, likes soy based milk.    Food and Nutrient Administration History  Additional Food and Nutrient Administration History: 5/24, assessed by RDN and provided with Low fat diet guidelines.    Diagnosis   Allergic rhinitis   Essential hypertension   Mild persistent asthma   Hypomagnesemia   Nicotine use   Neuropraxia of left lower extremity   Transaminitis   Hypophosphatemia   Electrolyte abnormality   Hypokalemia   HTN, chronic diarrhea s/p cholecystectomy 9/22    Anthropometrics:  Height: 170.2 cm (5' 7.01\")  Weight: 71.2 kg (157 lb)  BMI (Calculated): 24.58  Weight Change  Weight History / % Weight Change: 6/18/24: 71.2 kg, 5/8/24: 74.8 kg, 1/13/24: 77.1 kg, 10/17/23: 89.3 kg.  Significant Weight Loss: Yes  Interpretation of Weight Loss: >20% in 1 year  IBW/kg (Dietitian Calculated): 61.4 kg   Amputation Calculations:  BMI Amputation Adjustment: No    Energy Needs:  Estimated Energy Needs  Method for Estimating Needs: 3418-4628 @ 30-32 kcal/kg    Estimated Protein Needs  Method for Estimating Needs: 74-86 @ 1.2-1.4 gr/kg    Estimated Fluid Needs  Total Fluid Estimated Needs (mL): 1845 mL  Total Fluid Estimated Needs (mL/kg): 30 mL/kg     Dietary Orders  Adult diet Regular      Nutrition Focused Physical Findings:  Subcutaneous Fat Loss  Orbital Fat Pads: Well " nourished (slightly bulging fat pads)  Buccal Fat Pads: Well nourished (full, rounded cheeks)    Muscle Wasting  Temporalis: Mild-Moderate (slight depression)  Pectoralis (Clavicular Region): Mild-Moderate (some protrusion of clavicle)  Deltoid/Trapezius: Mild-Moderate (slight protrusion of acromion process)  Interosseous: Mild-Moderate (slightly depressed area between thumb and forefinger)    Nutrition Diagnosis   Malnutrition Diagnosis  Patient has Malnutrition Diagnosis: Yes  Malnutrition Diagnosis: Severe malnutrition related to chronic disease or condition  As Evidenced by: intake <75% of estimated energy needs for >1 month; 20% weight loss in 12 months; mild- moderate muscle loss.    Patient has Nutrition Diagnosis: Yes  Nutrition Diagnosis 1: Inadequate protein energy intake  Diagnosis Status (1): New  Related to (1): altered GI function  As Evidenced by (1): unintended weight loss     Nutrition Diagnosis 2: Altered nutrition related to laboratory values  Diagnosis Status (2): New  Related to (2): chronic diarrhea & emesis  As Evidenced by (2): labs     Nutrition Interventions/Recommendations   Nutrition Prescription  Individualized Nutrition Prescription Provided for : Continue low fat diet at home.  Consider 5-6 smaller meals.  During bouts of diarrhea, select lower fiber foods.  Encouraged protein sources with every meal, reviewed plant based options. Take Creon as ordered.  Daily MVI and mineral replacements.    Food and/or Nutrient Delivery Interventions  Meals and Snacks: Fat-modified diet  Goal: intake meets >75% estimated nutrient needs.       Nutrition Monitoring and Evaluation   Food and Nutrient Related History   Amount of Food: Estimated amout of food  Criteria: intake >75% of meals    Anthropometrics: Body Composition/Growth/Weight History  Weight: Measured weight  Criteria: weekly  Weight Change: Weight change percentage  Criteria: monthly    Biochemical Data, Medical Tests and  Procedures  Electrolyte and Renal Panel: BUN, Calcium, serum, Chloride, Creatinine, Magnesium, Phosphorus, Potassium, Sodium  Criteria: as indicated  Gastrointestinal Profile: Alanine aminotransferase (ALT), Alkaline phosphatase, Amylase, Aspartate aminotransferase (AST), Gastroesophageal reflux monitoring  Criteria: as indicated  Vitamin Profile: Vitamin C, plasma or serum, Vitamin D, 25 hydroxy, Other (Comment)  Criteria: as indicated    Nutrition Focused Physical Findings   Digestive System: Constipation, Diarrhea, Early satiety, Nausea, Vomiting  Criteria: daily    Follow Up  Last Date of Nutrition Visit: 10/01/24  Nutrition Follow-Up Needed?: Dietitian to reassess per policy  Follow up Comment: [id number]

## 2024-10-01 NOTE — CARE PLAN
The clinical goals for the shift include Pt will maintain safety, free from falls and injuries throughout this shift    Over the shift, the patient did not make progress toward the following goals.       Problem: Pain - Adult  Goal: Verbalizes/displays adequate comfort level or baseline comfort level  Outcome: Progressing     Problem: Safety - Adult  Goal: Free from fall injury  Outcome: Progressing     Problem: Discharge Planning  Goal: Discharge to home or other facility with appropriate resources  Outcome: Progressing     Problem: Chronic Conditions and Co-morbidities  Goal: Patient's chronic conditions and co-morbidity symptoms are monitored and maintained or improved  Outcome: Progressing     Problem: Fall/Injury  Goal: Not fall by end of shift  Outcome: Progressing  Goal: Be free from injury by end of the shift  Outcome: Progressing  Goal: Verbalize understanding of personal risk factors for fall in the hospital  Outcome: Progressing  Goal: Verbalize understanding of risk factor reduction measures to prevent injury from fall in the home  Outcome: Progressing  Goal: Use assistive devices by end of the shift  Outcome: Progressing  Goal: Pace activities to prevent fatigue by end of the shift  Outcome: Progressing     Problem: Nutrition  Goal: Less than 5 days NPO/clear liquids  Outcome: Progressing  Goal: Oral intake greater than 50%  Outcome: Progressing  Goal: Oral intake greater 75%  Outcome: Progressing  Goal: Consume prescribed supplement  Outcome: Progressing  Goal: Adequate PO fluid intake  Outcome: Progressing  Goal: Nutrition support goals are met within 48 hrs  Outcome: Progressing  Goal: Nutrition support is meeting 75% of nutrient needs  Outcome: Progressing  Goal: Tube feed tolerance  Outcome: Progressing  Goal: BG  mg/dL  Outcome: Progressing  Goal: Lab values WNL  Outcome: Progressing  Goal: Electrolytes WNL  Outcome: Progressing  Goal: Promote healing  Outcome: Progressing  Goal: Maintain  stable weight  Outcome: Progressing  Goal: Reduce weight from edema/fluid  Outcome: Progressing  Goal: Gradual weight gain  Outcome: Progressing  Goal: Improve ostomy output  Outcome: Progressing

## 2024-10-01 NOTE — PROGRESS NOTES
"[facility]          Admitting Provider: Dwight Daniels MD Admission Date: 9/29/2024.   Attending Provider: Dwight Daniels MD MRN: [id number]       Veronica Rmairez is a 44 y.o. female on day 0 of admission presenting with Hypomagnesemia.  Interval History Leg cramps improved     Objective   Physical Exam  Last Recorded Vitals: Blood pressure 124/88, pulse 75, temperature 36.2 °C (97.2 °F), temperature source Temporal, resp. rate 17, height 1.702 m (5' 7\"), weight 71.2 kg (157 lb), SpO2 96%.  Patient Vitals for the past 24 hrs:   BP Temp Temp src Pulse Resp SpO2   10/01/24 0700 124/88 36.2 °C (97.2 °F) Temporal 75 17 96 %   10/01/24 0040 (!) 131/94 36.4 °C (97.5 °F) Temporal 82 16 96 %   10/01/24 0000 129/90 36.6 °C (97.9 °F) Temporal 84 17 97 %   09/30/24 2100 (!) 141/99 36.6 °C (97.9 °F) Temporal 79 17 96 %   09/30/24 1905 -- -- -- -- -- 99 %   09/30/24 1500 128/80 36.2 °C (97.2 °F) Temporal 70 16 97 %   09/30/24 1100 121/83 36 °C (96.8 °F) Temporal 73 16 97 %     Body mass index is 24.59 kg/m².  GENERAL: alert, cooperative, or no distress  SKIN: no rashes  HEENT Atraumatic, Normocephalic and Not pale, no icterus  NECK: supple, no thyromegaly, JVP within normal limits  LUNGS:  not in respiratory distress, respiratory rate normal  CARDIAC: regular rate and rhythm,   ABDOMEN: Soft, non-tender, normal bowel sounds; no bruits, organomegaly or masses.  EXTREMITIES: No edema  NEURO: Alert and oriented x 3 , gait normal ., reflexes normal and symmetric, strength and  sensation grossly normal  Intake/Output last 3 Shifts:  I/O last 3 completed shifts:  In: 100 (1.4 mL/kg) [I.V.:100 (1.4 mL/kg)]  Out: 600 (8.4 mL/kg) [Urine:600 (0.2 mL/kg/hr)]  Weight: 71.2 kg   DATA:   Diagnostic tests reviewed for today's visit:    Most recent Labs  Results for orders placed or performed during the hospital encounter of 09/29/24 (from the past 24 hour(s))   ECG 12 lead   Result Value Ref " "Range    Ventricular Rate 77 BPM    Atrial Rate 77 BPM    DC Interval 122 ms    QRS Duration 84 ms    QT Interval 424 ms    QTC Calculation(Bazett) 479 ms    P Axis 35 degrees    R Axis 7 degrees    T Axis 30 degrees    QRS Count 12 beats    Q Onset 219 ms    P Onset 158 ms    P Offset 199 ms    T Offset 431 ms    QTC Fredericia 460 ms   Electrocardiogram, 12-lead PRN ACS symptoms   Result Value Ref Range    Ventricular Rate 80 BPM    Atrial Rate 80 BPM    DC Interval 138 ms    QRS Duration 80 ms    QT Interval 414 ms    QTC Calculation(Bazett) 477 ms    P Axis 54 degrees    R Axis 4 degrees    T Axis 26 degrees    QRS Count 13 beats    Q Onset 220 ms    P Onset 151 ms    P Offset 204 ms    T Offset 427 ms    QTC Fredericia 456 ms   Basic Metabolic Panel   Result Value Ref Range    Glucose 104 (H) 74 - 99 mg/dL    Sodium 135 (L) 136 - 145 mmol/L    Potassium 4.0 3.5 - 5.3 mmol/L    Chloride 99 98 - 107 mmol/L    Bicarbonate 26 21 - 32 mmol/L    Anion Gap 14 10 - 20 mmol/L    Urea Nitrogen 4 (L) 6 - 23 mg/dL    Creatinine 0.56 0.50 - 1.05 mg/dL    eGFR >90 >60 mL/min/1.73m*2    Calcium 7.9 (L) 8.6 - 10.3 mg/dL   Magnesium   Result Value Ref Range    Magnesium 1.40 (L) 1.60 - 2.40 mg/dL   Lavender Top   Result Value Ref Range    Extra Tube Hold for add-ons.      Urine Culture   Date Value Ref Range Status   09/24/2023 NO SIGNIFICANT GROWTH.  Final    No results found for: \"RESPCULTSM\" No results found for: \"PERDIAFLDCUL\" No results found for: \"STERFLDCULSM\"   Electrocardiogram, 12-lead PRN ACS symptoms    Result Date: 10/1/2024  Normal sinus rhythm Normal ECG When compared with ECG of 30-SEP-2024 09:36, No significant change was found    ECG 12 lead    Result Date: 9/30/2024   Poor data quality, interpretation may be adversely affected Normal sinus rhythm Normal ECG When compared with ECG of 29-SEP-2024 14:19, (unconfirmed) Vent. rate has decreased BY  45 BPM See ED provider note for full interpretation and clinical " correlation Confirmed by Olivia Loredo ([id number]) on 9/30/2024 10:05:20 AM    Electrocardiogram, 12-lead PRN ACS symptoms    Result Date: 9/30/2024  Normal sinus rhythm Prolonged QT Abnormal ECG When compared with ECG of 20-SEP-2024 17:20, No significant change was found   Current Facility-Administered Medications   Medication Dose Route Frequency Provider Last Rate Last Admin    acetaminophen (Tylenol) tablet 650 mg  650 mg oral 6x daily Dwight Daniels MD   650 mg at 10/01/24 0613    albuterol 2.5 mg /3 mL (0.083 %) nebulizer solution 2.5 mg  2.5 mg nebulization q2h PRN Dwight Daniels MD        amLODIPine (Norvasc) tablet 5 mg  5 mg oral Daily Dwight Daniels MD   5 mg at 10/01/24 0825    budesonide (Pulmicort) 0.5 mg/2 mL nebulizer solution 0.5 mg  0.5 mg nebulization BID Portia Milo, PharmD   0.5 mg at 09/30/24 1905    And    formoterol (Perforomist) 20 mcg/2 mL nebulizer solution 20 mcg  20 mcg nebulization BID Portia Milo, PharmD   20 mcg at 09/30/24 1905    cetirizine (ZyrTEC) tablet 10 mg  10 mg oral Daily Dwight Daniels MD   10 mg at 10/01/24 0825    cholestyramine light (Prevalite) 4 gram packet 8 g  8 g oral TID Dwight Daniels MD   8 g at 10/01/24 0826    cloNIDine (Catapres) tablet 0.1 mg  0.1 mg oral Daily Dwight Daniels MD   0.1 mg at 10/01/24 0825    DULoxetine (Cymbalta) DR capsule 60 mg  60 mg oral Daily Dwight Daniels MD   60 mg at 10/01/24 0826    famotidine (Pepcid) tablet 20 mg  20 mg oral Nightly Dwgiht Daniels MD   20 mg at 09/30/24 2100    ketotifen (Zaditor) 0.025 % (0.035 %) ophthalmic solution 1 drop  1 drop Both Eyes BID Dwight Daniels MD   1 drop at 10/01/24 0825    losartan (Cozaar) tablet 100 mg  100 mg oral Daily Dwight Daniels MD   100 mg at 10/01/24 0825    magnesium oxide (Mag-Ox) tablet 800 mg  800 mg oral TID Dwight Daniels MD        magnesium sulfate 4 g in sterile water for injection 100 mL  4 g intravenous Once Dwight Daniels MD  25 mL/hr at 10/01/24 0835 4 g at 10/01/24 0835    melatonin tablet 6 mg  6 mg oral Nightly PRN CIRO Alcala-CNP   6 mg at 09/30/24 0109    norgestimate-ethinyl estradioL (Ortho Tri-Cyclen LO) 0.18/0.215/0.25 mg-25 mcg per tablet - PATIENT'S OWN MEDICATION  1 tablet oral Daily Dwight Daniels MD        ondansetron ODT (Zofran-ODT) disintegrating tablet 4 mg  4 mg oral q8h PRN Dwight Daniels MD        pantoprazole (ProtoNix) EC tablet 40 mg  40 mg oral Daily before breakfast Dwight Daniels MD   40 mg at 10/01/24 0613    polyethylene glycol (Glycolax, Miralax) packet 17 g  17 g oral Daily Dwight Daniels MD        potassium chloride CR (Klor-Con M20) ER tablet 20 mEq  20 mEq oral Daily Dwight Daniels MD   20 mEq at 10/01/24 0826    simethicone (Mylicon) chewable tablet 80 mg  80 mg oral 4x daily PRN Dwight Daniels MD        spironolactone (Aldactone) tablet 25 mg  25 mg oral Daily Dwight Daniels MD   25 mg at 10/01/24 0826    tiZANidine (Zanaflex) tablet 4 mg  4 mg oral BID PRN Dwight Daniels MD   4 mg at 09/29/24 2355    traMADol (Ultram) tablet 50 mg  50 mg oral q6h PRN Dwight Daniels MD   50 mg at 10/01/24 0828     No current outpatient medications on file.   ..     Medication and Non-Pharmacologic VTE Prophylaxis/Anticoagulants      Last Anticoag Admin            No anticoagulants administered    No unadministered anticoagulant orders found.          Assessment/Plan   Sharla Ramirez has  Principal Problem:    Hypomagnesemia     Prolonged QT    HTN   Replace  improved   Other Hospital problems        Abnormal findings not addressed during hospitalization, but require out patient follow up. None        I spent 35 minutes talking and examining Sharla Ramirez, reviewing the labs & medications, formulating plan of care & discussing with patient and nursing staff.    Dwight Daniels MD

## 2024-10-01 NOTE — CARE PLAN
The patient's goals for the shift include  discharge today and remain safe    The clinical goals for the shift include Pt will maintain safety, free from falls and injuries throughout this shift      Problem: Pain - Adult  Goal: Verbalizes/displays adequate comfort level or baseline comfort level  Outcome: Progressing     Problem: Safety - Adult  Goal: Free from fall injury  Outcome: Progressing     Problem: Discharge Planning  Goal: Discharge to home or other facility with appropriate resources  Outcome: Progressing     Problem: Chronic Conditions and Co-morbidities  Goal: Patient's chronic conditions and co-morbidity symptoms are monitored and maintained or improved  Outcome: Progressing     Problem: Fall/Injury  Goal: Not fall by end of shift  Outcome: Progressing  Goal: Be free from injury by end of the shift  Outcome: Progressing  Goal: Verbalize understanding of personal risk factors for fall in the hospital  Outcome: Progressing  Goal: Verbalize understanding of risk factor reduction measures to prevent injury from fall in the home  Outcome: Progressing  Goal: Use assistive devices by end of the shift  Outcome: Progressing  Goal: Pace activities to prevent fatigue by end of the shift  Outcome: Progressing     Problem: Nutrition  Goal: Less than 5 days NPO/clear liquids  Outcome: Progressing  Goal: Oral intake greater than 50%  Outcome: Progressing  Goal: Oral intake greater 75%  Outcome: Progressing  Goal: Consume prescribed supplement  Outcome: Progressing  Goal: Adequate PO fluid intake  Outcome: Progressing  Goal: Nutrition support goals are met within 48 hrs  Outcome: Progressing  Goal: Nutrition support is meeting 75% of nutrient needs  Outcome: Progressing  Goal: Tube feed tolerance  Outcome: Progressing  Goal: BG  mg/dL  Outcome: Progressing  Goal: Lab values WNL  Outcome: Progressing  Goal: Electrolytes WNL  Outcome: Progressing  Goal: Promote healing  Outcome: Progressing  Goal: Maintain stable  weight  Outcome: Progressing  Goal: Reduce weight from edema/fluid  Outcome: Progressing  Goal: Gradual weight gain  Outcome: Progressing  Goal: Improve ostomy output  Outcome: Progressing

## 2024-10-01 NOTE — NURSING NOTE
Discharge instructions provided using teach back method. Pt's health related  risk factors discussed with pt. pt educated to look for any worsening sign and symptoms. Pt educated to seek medical attention if experience any medical emergency. Pt aware to follow up with outpatient clinics as scheduled. Home going meds reviewed with pt. Pt verbalized understanding of disposition and discharge instructions. All questions answered to patient's satisfaction and within nursing scope of practice. Vitals stable, pt resting in bed, IV gtt mag infusing. Once infusion complete pt is ok to go home.

## 2024-10-01 NOTE — NURSING NOTE
Pt discharged instruction given by discharge nurse, Dionicio Snell RN. Pt wheeled out by RADHA Kennedy. All belongings sent home with pt.

## 2024-10-19 LAB
ATRIAL RATE: 100 BPM
P AXIS: 54 DEGREES
P OFFSET: 210 MS
P ONSET: 162 MS
PR INTERVAL: 122 MS
Q ONSET: 223 MS
QRS COUNT: 16 BEATS
QRS DURATION: 70 MS
QT INTERVAL: 376 MS
QTC CALCULATION(BAZETT): 485 MS
QTC FREDERICIA: 446 MS
R AXIS: 7 DEGREES
T AXIS: 32 DEGREES
T OFFSET: 411 MS
VENTRICULAR RATE: 100 BPM

## 2024-10-23 NOTE — DISCHARGE SUMMARY
Inpatient Discharge Summary      Admitting Provider: Dwight Daniels MD Discharge Provider: No att. providers found   Admission Date: 9/29/2024   Discharge Date: 10/1/2024    Primary Care Physician at Discharge: Saadia Sexton, APRN--[phone]  Discharge Diagnosis   Presenting Problem  Hypomagnesemia  Prolonged QT  HTN  Patient Active Problem List   Diagnosis    Allergic rhinitis    Essential hypertension    Mild persistent asthma    Hypomagnesemia    Nicotine use    Neuropraxia of left lower extremity    Transaminitis    Hypophosphatemia    Electrolyte abnormality    Hypokalemia      Hypomagnesemia [E83.42]  Discharge Disposition  Home  DETAILS OF HOSPITAL STAY   Presenting Problem  Hypomagnesemia    Hospital Course    Operative Procedures Performed    Treatments:  IV magnesium.  Consults: nephrology  Procedures:  none    Outpatient Follow-Up  Future Appointments   Date Time Provider Department Center   10/29/2024  2:20 PM Florence Bardales MD [id number]       Test Results Pending at Discharge  Pending Labs       No current pending labs.                                  Discharge Medications     New Medications   Discharge Medication List as of 10/1/2024 11:48 AM           Medication given & discontinued during hospitalization   Medications Discontinued During This Encounter   Medication Reason    amLODIPine (Norvasc) tablet 5 mg     albuterol 90 mcg/actuation inhaler 2 puff Duplicate order    magnesium chloride (MagDelay) EC tablet 64 mg     fluticasone furoate-vilanteroL (Breo Ellipta) 100-25 mcg/dose inhaler 1 puff     albuterol 2.5 mg /3 mL (0.083 %) nebulizer solution 2.5 mg     calcium gluconate 1 g in sodium chloride (iso) IV 50 mL     magnesium oxide (Mag-Ox) tablet 800 mg     spironolactone (Aldactone) 25 mg tablet     magnesium oxide (Mag-Ox) 400 mg tablet Stop Taking at Discharge    magnesium chloride (Slow-Mag) 71.5 mg tablet,delayed release (DR/EC) Stop Taking at  Discharge    ondansetron ODT (Zofran-ODT) 4 mg disintegrating tablet Stop Taking at Discharge    magnesium oxide (Mag-Ox) tablet 800 mg Patient Discharge    traMADol (Ultram) tablet 50 mg Patient Discharge    acetaminophen (Tylenol) tablet 650 mg Patient Discharge    melatonin tablet 6 mg Patient Discharge    albuterol 2.5 mg /3 mL (0.083 %) nebulizer solution 2.5 mg Patient Discharge    formoterol (Perforomist) 20 mcg/2 mL nebulizer solution 20 mcg Patient Discharge    budesonide (Pulmicort) 0.5 mg/2 mL nebulizer solution 0.5 mg Patient Discharge    polyethylene glycol (Glycolax, Miralax) packet 17 g Patient Discharge    famotidine (Pepcid) tablet 20 mg Patient Discharge    ketotifen (Zaditor) 0.025 % (0.035 %) ophthalmic solution 1 drop Patient Discharge    losartan (Cozaar) tablet 100 mg Patient Discharge    norgestimate-ethinyl estradioL (Ortho Tri-Cyclen LO) 0.18/0.215/0.25 mg-25 mcg per tablet - PATIENT'S OWN MEDICATION Patient Discharge    pantoprazole (ProtoNix) EC tablet 40 mg Patient Discharge    ondansetron ODT (Zofran-ODT) disintegrating tablet 4 mg Patient Discharge    potassium chloride CR (Klor-Con M20) ER tablet 20 mEq Patient Discharge    simethicone (Mylicon) chewable tablet 80 mg Patient Discharge    spironolactone (Aldactone) tablet 25 mg Patient Discharge    tiZANidine (Zanaflex) tablet 4 mg Patient Discharge    DULoxetine (Cymbalta) DR capsule 60 mg Patient Discharge    cloNIDine (Catapres) tablet 0.1 mg Patient Discharge    cholestyramine light (Prevalite) 4 gram packet 8 g Patient Discharge    cetirizine (ZyrTEC) tablet 10 mg Patient Discharge    amLODIPine (Norvasc) tablet 5 mg Patient Discharge      Discharge Medication List as of 10/1/2024 11:48 AM        STOP taking these medications       magnesium chloride (Slow-Mag) 71.5 mg tablet,delayed release (DR/EC) Comments:   Reason for Stopping:         ondansetron ODT (Zofran-ODT) 4 mg disintegrating tablet Comments:   Reason for Stopping:                    Medication that have Changed   Discharge Medication List as of 10/1/2024 11:48 AM        CONTINUE these medications which have CHANGED    Details   magnesium oxide (Mag-Ox) 400 mg (241.3 mg magnesium) tablet Take 2 tablets (800 mg) by mouth 3 times a day., Starting Tue 10/1/2024, Normal      spironolactone (Aldactone) 25 mg tablet Take 1 tablet (25 mg) by mouth once daily., Starting Tue 10/1/2024, Until u 10/31/2024, Normal                 Discharge Medications      Your medication list        CHANGE how you take these medications        Instructions Last Dose Given Next Dose Due   cholestyramine 4 gram packet  Commonly known as: Questran  What changed:   when to take this  reasons to take this      Take 2 packets (8 g) by mouth 3 times a day.       famotidine 20 mg tablet  Commonly known as: Pepcid  What changed:   when to take this  reasons to take this      Take 1 tablet (20 mg) by mouth once daily at bedtime.       magnesium oxide 400 mg (241.3 mg magnesium) tablet  Commonly known as: Mag-Ox  What changed:   medication strength  how much to take  when to take this      Take 2 tablets (800 mg) by mouth 3 times a day.              CONTINUE taking these medications        Instructions Last Dose Given Next Dose Due   albuterol 90 mcg/actuation inhaler           albuterol 2.5 mg /3 mL (0.083 %) nebulizer solution           amLODIPine 5 mg tablet  Commonly known as: Norvasc      Take 1 tablet (5 mg) by mouth once daily. Do not start before October 18, 2023.       blood pressure monitor kit  Commonly known as: Blood Pressure Kit      Check your blood pressure as indicted daily prior to and after medications.       cetirizine 10 mg tablet  Commonly known as: ZyrTEC           cloNIDine 0.1 mg tablet  Commonly known as: Catapres           DAILY-SANDRITA (WITH FOLIC ACID) ORAL           DULoxetine 60 mg DR capsule  Commonly known as: Cymbalta           fluticasone 50 mcg/actuation nasal spray  Commonly known  as: Flonase           fluticasone propion-salmeteroL 100-50 mcg/dose diskus inhaler  Commonly known as: Advair Diskus           Gas Relief (simethicone) 180 mg capsule  Generic drug: simethicone           ketotifen 0.025 % (0.035 %) ophthalmic solution  Commonly known as: Zaditor           losartan 100 mg tablet  Commonly known as: Cozaar           norgestimate-ethinyl estradioL 0.18/0.215/0.25 mg-25 mcg tablet  Commonly known as: Ortho Tri-Cyclen LO           omeprazole 20 mg DR capsule  Commonly known as: PriLOSEC           potassium chloride CR 20 mEq ER tablet  Commonly known as: Klor-Con M20           spironolactone 25 mg tablet  Commonly known as: Aldactone      Take 1 tablet (25 mg) by mouth once daily.       tiZANidine 4 mg tablet  Commonly known as: Zanaflex                  STOP taking these medications      ondansetron ODT 4 mg disintegrating tablet  Commonly known as: Zofran-ODT        Slow-Mag 71.5 mg tablet,delayed release (DR/EC)  Generic drug: magnesium chloride                  Where to Get Your Medications        These medications were sent to [facility]  [address] [place] [address], [facility] [address] [address]      Hours: 8 AM to 6 PM Mon-Fri, 9 AM to 1 PM Saturday Phone: [phone]   magnesium oxide 400 mg (241.3 mg magnesium) tablet  spironolactone 25 mg tablet          Outpatient Follow-Up  Future Appointments   Date Time Provider Department Center   10/29/2024  2:20 PM Florence Bardales MD [id number] [facility] M.D  [address] [place] [address], [address] [place] [address]  Office Phone [phone]

## 2024-10-29 ENCOUNTER — LAB (OUTPATIENT)
Dept: LAB | Facility: LAB | Age: 44
End: 2024-10-29
Payer: COMMERCIAL

## 2024-10-29 ENCOUNTER — APPOINTMENT (OUTPATIENT)
Dept: NEPHROLOGY | Facility: CLINIC | Age: 44
End: 2024-10-29
Payer: COMMERCIAL

## 2024-10-29 VITALS
HEIGHT: 67 IN | DIASTOLIC BLOOD PRESSURE: 89 MMHG | HEART RATE: 128 BPM | BODY MASS INDEX: 22.38 KG/M2 | WEIGHT: 142.6 LBS | SYSTOLIC BLOOD PRESSURE: 126 MMHG

## 2024-10-29 DIAGNOSIS — E83.42 HYPOMAGNESEMIA: Primary | ICD-10-CM

## 2024-10-29 DIAGNOSIS — E87.6 HYPOKALEMIA: ICD-10-CM

## 2024-10-29 DIAGNOSIS — E83.42 HYPOMAGNESEMIA: ICD-10-CM

## 2024-10-29 PROCEDURE — 84100 ASSAY OF PHOSPHORUS: CPT

## 2024-10-29 PROCEDURE — 99213 OFFICE O/P EST LOW 20 MIN: CPT | Performed by: INTERNAL MEDICINE

## 2024-10-29 PROCEDURE — 80053 COMPREHEN METABOLIC PANEL: CPT

## 2024-10-29 PROCEDURE — 3074F SYST BP LT 130 MM HG: CPT | Performed by: INTERNAL MEDICINE

## 2024-10-29 PROCEDURE — 83735 ASSAY OF MAGNESIUM: CPT

## 2024-10-29 PROCEDURE — 3008F BODY MASS INDEX DOCD: CPT | Performed by: INTERNAL MEDICINE

## 2024-10-29 PROCEDURE — 82150 ASSAY OF AMYLASE: CPT

## 2024-10-29 PROCEDURE — 83930 ASSAY OF BLOOD OSMOLALITY: CPT

## 2024-10-29 PROCEDURE — 85027 COMPLETE CBC AUTOMATED: CPT

## 2024-10-29 PROCEDURE — 82306 VITAMIN D 25 HYDROXY: CPT

## 2024-10-29 PROCEDURE — 3079F DIAST BP 80-89 MM HG: CPT | Performed by: INTERNAL MEDICINE

## 2024-10-29 PROCEDURE — 82248 BILIRUBIN DIRECT: CPT

## 2024-10-29 PROCEDURE — 83690 ASSAY OF LIPASE: CPT

## 2024-10-29 PROCEDURE — 82077 ASSAY SPEC XCP UR&BREATH IA: CPT

## 2024-10-29 PROCEDURE — 82330 ASSAY OF CALCIUM: CPT

## 2024-10-30 LAB
25(OH)D3 SERPL-MCNC: 20 NG/ML (ref 30–100)
ALBUMIN SERPL BCP-MCNC: 4.1 G/DL (ref 3.4–5)
ALP SERPL-CCNC: 236 U/L (ref 33–110)
ALT SERPL W P-5'-P-CCNC: 108 U/L (ref 7–45)
AMYLASE SERPL-CCNC: 35 U/L (ref 29–103)
ANION GAP SERPL CALC-SCNC: 24 MMOL/L (ref 10–20)
AST SERPL W P-5'-P-CCNC: 153 U/L (ref 9–39)
BILIRUB DIRECT SERPL-MCNC: 0.2 MG/DL (ref 0–0.3)
BILIRUB SERPL-MCNC: 0.7 MG/DL (ref 0–1.2)
BUN SERPL-MCNC: 10 MG/DL (ref 6–23)
CA-I BLD-SCNC: 1.13 MMOL/L (ref 1.1–1.33)
CALCIUM SERPL-MCNC: 9.4 MG/DL (ref 8.6–10.6)
CHLORIDE SERPL-SCNC: 98 MMOL/L (ref 98–107)
CO2 SERPL-SCNC: 24 MMOL/L (ref 21–32)
CREAT SERPL-MCNC: 0.59 MG/DL (ref 0.5–1.05)
EGFRCR SERPLBLD CKD-EPI 2021: >90 ML/MIN/1.73M*2
ERYTHROCYTE [DISTWIDTH] IN BLOOD BY AUTOMATED COUNT: 14.1 % (ref 11.5–14.5)
ETHANOL SERPL-MCNC: 40 MG/DL
GLUCOSE SERPL-MCNC: 78 MG/DL (ref 74–99)
HCT VFR BLD AUTO: 38.6 % (ref 36–46)
HGB BLD-MCNC: 12.6 G/DL (ref 12–16)
LIPASE SERPL-CCNC: 8 U/L (ref 9–82)
MAGNESIUM SERPL-MCNC: 1.79 MG/DL (ref 1.6–2.4)
MCH RBC QN AUTO: 34.1 PG (ref 26–34)
MCHC RBC AUTO-ENTMCNC: 32.6 G/DL (ref 32–36)
MCV RBC AUTO: 105 FL (ref 80–100)
NRBC BLD-RTO: 0 /100 WBCS (ref 0–0)
OSMOLALITY SERPL: 305 MOSM/KG (ref 280–300)
PHOSPHATE SERPL-MCNC: 4.5 MG/DL (ref 2.5–4.9)
PLATELET # BLD AUTO: 98 X10*3/UL (ref 150–450)
POTASSIUM SERPL-SCNC: 3.5 MMOL/L (ref 3.5–5.3)
PROT SERPL-MCNC: 7.4 G/DL (ref 6.4–8.2)
RBC # BLD AUTO: 3.69 X10*6/UL (ref 4–5.2)
SODIUM SERPL-SCNC: 142 MMOL/L (ref 136–145)
WBC # BLD AUTO: 5.8 X10*3/UL (ref 4.4–11.3)

## 2024-10-31 DIAGNOSIS — E87.6 HYPOKALEMIA: ICD-10-CM

## 2024-10-31 DIAGNOSIS — E83.42 HYPOMAGNESEMIA: Primary | ICD-10-CM

## 2024-10-31 DIAGNOSIS — R74.01 TRANSAMINITIS: ICD-10-CM

## 2024-10-31 DIAGNOSIS — E83.39 HYPOPHOSPHATEMIA: ICD-10-CM

## 2024-11-04 LAB
ALDOST/RENIN PLAS-RTO: <0.1 RATIO
ALDOSTERONE IN SERUM: <3 NG/DL
RENIN PLAS-CCNC: 23.8 NG/ML/HR

## 2024-11-06 NOTE — PROGRESS NOTES
"Subjective   Patient ID: Sharla Ramirez is a 44 y.o. female who presents for Med reconciliation    Referring Provider: Catia MCKINLEY  HPI: hypokalemia  HTN: home readings not available for review today  /89  Medications  Losartan 100mg qd  Spironolactone 25mg every day  Amlodipine 5mg every day  Clonidine 0.1mg every day    glucose: controlled and monitored      Medications reviewed for appropriate dosing in setting of CKD  Recommended changes:  - no adjustments needed at this time  Review of Systems        Objective     There were no vitals taken for this visit.     Labs  Lab Results   Component Value Date    BILITOT 0.7 10/29/2024    CALCIUM 9.4 10/29/2024    CO2 24 10/29/2024    CL 98 10/29/2024    CREATININE 0.59 10/29/2024    GLUCOSE 78 10/29/2024    ALKPHOS 236 (H) 10/29/2024    K 3.5 10/29/2024    PROT 7.4 10/29/2024     10/29/2024     (H) 10/29/2024     (H) 10/29/2024    BUN 10 10/29/2024    ANIONGAP 24 (H) 10/29/2024    MG 1.79 10/29/2024    PHOS 4.5 10/29/2024     (H) 02/22/2024    ALBUMIN 4.1 10/29/2024    AMYLASE 35 10/29/2024    LIPASE 8 (L) 10/29/2024    GFRF >90 09/25/2023     No results found for: \"TRIG\", \"CHOL\", \"LDLCALC\", \"HDL\"  Lab Results   Component Value Date    HGBA1C 4.7 10/31/2023       Current Outpatient Medications on File Prior to Visit   Medication Sig Dispense Refill    albuterol 2.5 mg /3 mL (0.083 %) nebulizer solution Take 3 mL (2.5 mg) by nebulization every 4 hours if needed for wheezing or shortness of breath.      albuterol 90 mcg/actuation inhaler Inhale 2 puffs every 6 hours if needed for wheezing.      amLODIPine (Norvasc) 5 mg tablet Take 1 tablet (5 mg) by mouth once daily. Do not start before October 18, 2023. 30 tablet 0    blood pressure monitor (Blood Pressure Kit) kit Check your blood pressure as indicted daily prior to and after medications. 1 kit 0    cetirizine (ZyrTEC) 10 mg tablet Take 1 tablet (10 mg) by mouth once daily.      " cholestyramine (Questran) 4 gram packet Take 2 packets (8 g) by mouth 3 times a day. (Patient taking differently: Take 2 packets (8 g) by mouth 3 times a day as needed.) 180 packet 0    cloNIDine (Catapres) 0.1 mg tablet Take 1 tablet (0.1 mg) by mouth once daily.      DULoxetine (Cymbalta) 60 mg DR capsule Take 1 capsule (60 mg) by mouth once daily.      famotidine (Pepcid) 20 mg tablet Take 1 tablet (20 mg) by mouth once daily at bedtime. (Patient taking differently: Take 1 tablet (20 mg) by mouth as needed at bedtime for indigestion or heartburn.) 30 tablet 0    fluticasone (Flonase) 50 mcg/actuation nasal spray Administer 1 spray into each nostril once daily. Shake gently. Before first use, prime pump. After use, clean tip and replace cap.      fluticasone propion-salmeteroL (Advair Diskus) 100-50 mcg/dose diskus inhaler Inhale 1 puff once daily in the morning. Take before meals.      ketotifen (Zaditor) 0.025 % (0.035 %) ophthalmic solution Administer 1 drop into both eyes 2 times a day.      losartan (Cozaar) 100 mg tablet Take 1 tablet (100 mg) by mouth once daily.      magnesium oxide (Mag-Ox) 400 mg (241.3 mg magnesium) tablet Take 2 tablets (800 mg) by mouth 3 times a day. 180 tablet 1    multivitamin with folic acid (DAILY-SANDRITA, WITH FOLIC ACID, ORAL) Take 1 tablet by mouth once daily.      potassium chloride CR 20 mEq ER tablet Take 1 tablet (20 mEq) by mouth once daily. Do not crush or chew.      simethicone (Gas Relief, simethicone,) 180 mg capsule Take 1 capsule (180 mg) by mouth 3 times a day as needed for flatulence.      spironolactone (Aldactone) 25 mg tablet Take 1 tablet (25 mg) by mouth once daily. 30 tablet 0     No current facility-administered medications on file prior to visit.        Assessment/Plan   PATIENT EDUCATION/DISCUSSION:  - Counseled patient on MOA, expectations, side effects, duration of therapy, contraindications, administration, and monitoring parameters  - Answered all  patient questions and concerns  - med rec completed today limited information available but med list looks accurate based on current available fill information  _______________________________________________________________________  PLAN  1. Get repeat labs done and follow up with dr moyer.      Trever Avendano, PharmD    Continue all meds under the continuation of care with the referring provider and clinical pharmacy team.

## 2024-11-07 ENCOUNTER — TELEMEDICINE (OUTPATIENT)
Dept: PHARMACY | Facility: HOSPITAL | Age: 44
End: 2024-11-07
Payer: COMMERCIAL

## 2024-11-07 DIAGNOSIS — E83.42 HYPOMAGNESEMIA: ICD-10-CM

## 2025-03-21 ENCOUNTER — APPOINTMENT (OUTPATIENT)
Dept: PRIMARY CARE | Facility: CLINIC | Age: 45
End: 2025-03-21
Payer: COMMERCIAL